# Patient Record
Sex: FEMALE | Race: WHITE | HISPANIC OR LATINO | Employment: OTHER | URBAN - METROPOLITAN AREA
[De-identification: names, ages, dates, MRNs, and addresses within clinical notes are randomized per-mention and may not be internally consistent; named-entity substitution may affect disease eponyms.]

---

## 2017-04-03 ENCOUNTER — OFFICE VISIT (OUTPATIENT)
Dept: MEDICAL GROUP | Age: 62
End: 2017-04-03
Payer: COMMERCIAL

## 2017-04-03 ENCOUNTER — HOSPITAL ENCOUNTER (OUTPATIENT)
Dept: RADIOLOGY | Facility: MEDICAL CENTER | Age: 62
End: 2017-04-03
Attending: FAMILY MEDICINE
Payer: COMMERCIAL

## 2017-04-03 VITALS
OXYGEN SATURATION: 94 % | TEMPERATURE: 97.1 F | HEIGHT: 64 IN | SYSTOLIC BLOOD PRESSURE: 138 MMHG | BODY MASS INDEX: 31.07 KG/M2 | WEIGHT: 182 LBS | HEART RATE: 101 BPM | DIASTOLIC BLOOD PRESSURE: 82 MMHG

## 2017-04-03 DIAGNOSIS — M25.552 CHRONIC LEFT HIP PAIN: ICD-10-CM

## 2017-04-03 DIAGNOSIS — E78.5 HYPERLIPIDEMIA WITH TARGET LDL LESS THAN 70: ICD-10-CM

## 2017-04-03 DIAGNOSIS — G89.29 CHRONIC LEFT HIP PAIN: ICD-10-CM

## 2017-04-03 DIAGNOSIS — E11.00 UNCONTROLLED TYPE 2 DIABETES MELLITUS WITH HYPEROSMOLARITY WITHOUT COMA, WITHOUT LONG-TERM CURRENT USE OF INSULIN (HCC): ICD-10-CM

## 2017-04-03 DIAGNOSIS — K21.9 GASTROESOPHAGEAL REFLUX DISEASE WITHOUT ESOPHAGITIS: ICD-10-CM

## 2017-04-03 DIAGNOSIS — Z00.00 PREVENTATIVE HEALTH CARE: ICD-10-CM

## 2017-04-03 LAB — POC HEMOGLOBIN: 8.3

## 2017-04-03 PROCEDURE — 90471 IMMUNIZATION ADMIN: CPT | Performed by: FAMILY MEDICINE

## 2017-04-03 PROCEDURE — 85018 HEMOGLOBIN: CPT | Performed by: FAMILY MEDICINE

## 2017-04-03 PROCEDURE — 99215 OFFICE O/P EST HI 40 MIN: CPT | Mod: 25 | Performed by: FAMILY MEDICINE

## 2017-04-03 PROCEDURE — 90732 PPSV23 VACC 2 YRS+ SUBQ/IM: CPT | Performed by: FAMILY MEDICINE

## 2017-04-03 PROCEDURE — 73521 X-RAY EXAM HIPS BI 2 VIEWS: CPT

## 2017-04-03 RX ORDER — OMEPRAZOLE 40 MG/1
40 CAPSULE, DELAYED RELEASE ORAL DAILY
Qty: 90 CAP | Refills: 0 | Status: SHIPPED | OUTPATIENT
Start: 2017-04-03 | End: 2020-08-30

## 2017-04-03 NOTE — ASSESSMENT & PLAN NOTE
Patient has been walking with a limp for several years. They are requesting V paperwork request for handicap privileges.

## 2017-04-03 NOTE — ASSESSMENT & PLAN NOTE
Patient denies any difficulty swallowing, no regurgitation, no eructation, no weight loss, no nocturnal asthma no food getting stuck in the chest.

## 2017-04-03 NOTE — ASSESSMENT & PLAN NOTE
The patient has been on a atorvastatin for years and tolerating this fine. The patient denies any muscle aches, no abdominal pain and no history of elevated liver enzymes.

## 2017-04-03 NOTE — ASSESSMENT & PLAN NOTE
Patient is a 62-year-old female who presents to clinic to establish care. She has a significant past medical history of uncontrolled diabetes mellitus, hypertension, GERD, chronic left hip pain.   The patient denied any chest pain, no sob, no stephens, no  pnd, no orthopnea, no headache, no changes in vision, no numbness or tingling, no nausea, no diarrhea, no abdominal pain, no fevers, no chills, no bright red blood per rectum, no  difficulty urinating, no burning during micturition, no depressed mood, no other concerns.

## 2017-04-03 NOTE — PROGRESS NOTES
This medical record contains text that has been entered with the assistance of computer voice recognition and dictation software.  Therefore, it may contain unintended errors in text, spelling, punctuation, or grammar    Chief Complaint   Patient presents with   • [facility]     Diabetes   • Medication Management       Domonique Galeano is a 62 y.o. female here evaluation and management of: Establish care, diabetes, hyperlipidemia, chronic left hip pain      HPI:     Type II diabetes mellitus, uncontrolled  Takes glipizide 10mg bid  Metformin 850mg tid  Stopped insulin on her own  Checks BG tid when she has machine    She stopped insulin because it was too expensive, also she did not feel good taking insuling. So she stopped.    He denied any polyuria, no polydipsia, no polyphagia, no weight loss, no numbness or tingling anywhere, no change in vision.    The patient is on an ACE inhibitor, aspirin and a statin, also checks feet meticulously daily for ulcerations.    Retinal screening due in august 2017  microalbumin is due   Up to date on vaccinations needs pneumovax    Hemoglobin A1c 8.3 in clinic today    Chronic left hip pain  Patient has been walking with a limp for several years. They are requesting DMV paperwork request for handicap privileges.    Hyperlipidemia with target LDL less than 70  The patient has been on a atorvastatin for years and tolerating this fine. The patient denies any muscle aches, no abdominal pain and no history of elevated liver enzymes.    [facility] health care  Patient is a 62-year-old female who presents to clinic to [facility]. She has a significant past medical history of uncontrolled diabetes mellitus, hypertension, GERD, chronic left hip pain.   The patient denied any chest pain, no sob, no stephens, no  pnd, no orthopnea, no headache, no changes in vision, no numbness or tingling, no nausea, no diarrhea, no abdominal pain, no fevers, no chills, no bright red blood per  rectum, no  difficulty urinating, no burning during micturition, no depressed mood, no other concerns.    GERD (gastroesophageal reflux disease)  Patient denies any difficulty swallowing, no regurgitation, no eructation, no weight loss, no nocturnal asthma no food getting stuck in the chest.    BMI 30.0-30.9,adult  Patient knows she can increase activity over the left hip pain prevents her from being more active. She will try to make changes in her diet.      Current medicines (including changes today)  Current Outpatient Prescriptions   Medication Sig Dispense Refill   • omeprazole (PRILOSEC) 40 MG delayed-release capsule Take 1 Cap by mouth every day. 90 Cap 0   • sitagliptin (JANUVIA) 50 MG Tab Take 1 Tab by mouth every day. 90 Tab 0   • Lancets Misc USE UP TO 3 TIMES A DAY AND AS NEEDED FOR SSX OF HIGH OR LOW SUGAR 100 Each 11   • metformin (GLUCOPHAGE) 850 MG Tab Take 1 Tab by mouth 3 times a day, with meals. 90 Tab 11   • Insulin Pen Needle 31G X 5 MM Misc 1 Units by Does not apply route 2 Times a Day. 100 Each 11   • atorvastatin (LIPITOR) 20 MG Tab TAKE 1 TAB BY MOUTH EVERY BEDTIME. 30 Tab 10   • lisinopril (PRINIVIL) 2.5 MG Tab Take 1 Tab by mouth every day. TAKE 1 TAB BY MOUTH EVERY DAY. 30 Tab 11   • polyethylene glycol 3350 (MIRALAX) Powder Take 17 g by mouth every day. 1 Bottle 11   • Blood Glucose Monitoring Suppl SUPPLIES Misc Test strips order: Test strips for One Touch Ultra meter. Sig: use TID and prn ssx high or low sugar #100 RF x 3 100 Each 11   • omeprazole (PRILOSEC) 20 MG delayed-release capsule TAKE ONE CAPSULE BY MOUTH EVERY DAY 30 Cap 0   • ONE TOUCH ULTRA TEST strip   3   • clotrimazole-betamethasone (LOTRISONE) 1-0.05 % CREA Apply 1 Application to affected area(s) 2 times a day. X 4 weeks 1 Tube 1   • ondansetron (ZOFRAN) 4 MG Tab tablet Take 1 Tab by mouth every 8 hours as needed (FOR NAUSEA OR VIMITING) for up to 6 doses. 6 Each 2   • ondansetron (ZOFRAN) 4 MG Tab tablet Take 1 Tab by  mouth every 8 hours as needed (FOR NAUSEA OR VIMITING) for up to 6 doses. 6 Each 2   • glipiZIDE (GLUCOTROL) 10 MG Tab TAKE 1 TAB BY MOUTH 2 TIMES A DAY. 180 Tab 1   • insulin glargine (LANTUS) 100 UNIT/ML Solution Pen-injector injection Inject 10 Units as instructed 2 times a day. 5 PEN 11   • omeprazole (PRILOSEC) 20 MG delayed-release capsule TAKE ONE CAPSULE BY MOUTH EVERY DAY 30 Cap 9   • Misc. Devices (WHEELCHAIR) Misc 1 Units by Does not apply route 1 time daily as needed. 1 Each 0   • miconazole (MICOTIN) 2 % CREA Apply 1 Application to affected area(s) 2 times a day. 60 g 3     No current facility-administered medications for this visit.     She  has a past medical history of Type II or unspecified type diabetes mellitus without mention of complication, uncontrolled (2015) and Hyperlipidemia LDL goal < 70 (2015). She also has no past medical history of Diabetic neuropathy (CMS-HCC), Hypertension, Heart murmur, Heart attack (CMS-HCC), Stroke (CMS-HCC), Arrhythmia, Seizure (CMS-HCC), Thyroid disease, Asthma, Chronic airway obstruction, not elsewhere classified (CMS-HCC), Emphysema, or CHF (congestive heart failure) (CMS-HCC).  She  has past surgical history that includes appendectomy.  Social History   Substance Use Topics   • Smoking status: Never Smoker    • Smokeless tobacco: Never Used   • Alcohol Use: No     Social History     Social History Narrative     Family History   Problem Relation Age of Onset   • Diabetes Mother    • Diabetes Sister    • Diabetes Maternal Grandmother      Family Status   Relation Status Death Age   • Mother     • Father Other    • Sister     • Brother Alive    • Maternal Grandmother     • Maternal Grandfather     • Paternal Grandmother     • Paternal Grandfather     • Sister Alive          ROS    All other systems reviewed and are negative     Objective:     Blood pressure 138/82, pulse 101, temperature 36.2 °C (97.1 °F),  "height 1.632 m (5' 4.25\"), weight 82.555 kg (182 lb), SpO2 94 %. Body mass index is 31 kg/(m^2).  Physical Exam:    Constitutional: Alert, no distress.  Skin: Warm, dry, good turgor, no rashes in visible areas.  Eye: Equal, round and reactive, conjunctiva clear, lids normal.  ENMT: Lips without lesions, good dentition, oropharynx clear.  Neck: Trachea midline, no masses, no thyromegaly. No cervical or supraclavicular lymphadenopathy.  Respiratory: Unlabored respiratory effort, lungs clear to auscultation, no wheezes, no ronchi.  Cardiovascular: Normal S1, S2, no murmur, no edema.  Abdomen: Soft, non-tender, no masses, no hepatosplenomegaly.  Psych: Alert and oriented x3, normal affect and mood.  Monofilament testing with a 10 gram force: sensation intact: intact bilaterally  Visual Inspection: Feet without maceration, ulcers, fissures.  Pedal pulses: intact bilaterally            Assessment and Plan:   The following treatment plan was discussed, again this medical record contains text that has been entered with the assistance of computer voice recognition and dictation software.  Therefore, it may contain unintended errors in text, spelling, punctuation, or grammar      1. Uncontrolled type 2 diabetes mellitus with hyperosmolarity without coma, without long-term current use of insulin (CMS-[facility])  The patient was counseled on the following  She refuses to take insulin  1. meticulous bilateral feet inspection daily  2. yearly dilated eye exams,   3. Wt. loss of 5% will decrease insulin resistance follow a low-fat diet and to begin an exercise program  4.  Pt. is on baby aspirin  5. Patient  is on an ace inhibitor will check microalbumin yearly  6. BP goal is ,130/80, and LDL goal is <70  7. Hba1c goal is less than 7  8. Recommend cmp and A1C evaluation every 3 months  9. Check blood sugar twice daily  and minimum once daily at various times  10. Also needs diabetic education  11. Recommend vaccinations: Yearly Flu, " Pneumvox, and hepatitis B vaccine  12. Yearly dental exam  13. Patient is on a Statin  - POCT Hemoglobin  - COMP METABOLIC PANEL; Future  - LIPID PROFILE; Future  - PNEUMOCOCCAL POLYSACCHARIDE VACCINE 23-VALENT =>1YO SQ/IM  - MICROALBUMIN CREAT RATIO URINE; Future  - sitagliptin (JANUVIA) 50 MG Tab; Take 1 Tab by mouth every day.  Dispense: 90 Tab; Refill: 0    2. Hyperlipidemia with target LDL less than 70  Patient has been stable with current management  We will make no changes for now    3. Gastroesophageal reflux disease without esophagitis     Gerd precautions given (avoid the supine position after eating, avoid tight fitting clothing, head of bed elevation by raisin legs of bed,  avoid eating prior to sleeping, avoid reflux-inducing foods (foods high in fat, chocolate, peppermint, and excessive alcohol, which can decrease LES pressure), promote salivation by chewing gum or lozenges,  - omeprazole (PRILOSEC) 40 MG delayed-release capsule; Take 1 Cap by mouth every day.  Dispense: 90 Cap; Refill: 0    4. Preventative health care  Care has been established  We need baseline labs to establish a clinical profile  We will then consider daily prophylactic aspirin   Weight the benefits vs risk of GI bleed  We reviewed [place] preventative guidelines  This patient is due for mammogram  Up to date on colonoscopy   Requested Medical records to be sent to us  This patient is due for    5. Chronic left hip pain  DMV paperwork signed for handicap kelley x 2 years    - DX-HIP-BILATERAL-WITH PELVIS-2 VIEWS; Future    6. BMI 30.0-30.9,adult    We discussed agressive lifestyle modifications with weight loss, aerobic exercise, and eating a prudent diet, which  included avoiding fried foods, using low-fat milk and avoiding simple carbohydrate, making a food diary. If you can't run because of pain do the stationary bike/elipticle or swim 30minutes 3 times per wk, in the beginning and then gradually increase.      Over 40 minutes  spent with patient face to face, greater than 50% time spent with plan/cordination of care as above in my A/P.      Followup: Return in about 2 months (around 6/3/2017) for Reevaluation.

## 2017-04-03 NOTE — MR AVS SNAPSHOT
"        Domonique Galeano   4/3/2017 2:00 PM   Office Visit   MRN: [id number]    Department:  [address] [place] [address]   Dept Phone:  [phone]    Description:  Female : 1955   Provider:  Lily Le M.D.           Reason for Visit     Establish Care Diabetes    Medication Management           Allergies as of 4/3/2017     No Known Allergies      You were diagnosed with     Uncontrolled type 2 diabetes mellitus with hyperosmolarity without coma, without long-term current use of insulin (CMS-HCC)   [2918209]       Hyperlipidemia with target LDL less than 70   [674872]       Gastroesophageal reflux disease without esophagitis   [463692]       Preventative health care   [078279]       Chronic left hip pain   [024191]       BMI 30.0-30.9,adult   [129271]         Vital Signs     Blood Pressure Pulse Temperature Height Weight Body Mass Index    138/82 mmHg 101 36.2 °C (97.1 °F) 1.632 m (5' 4.25\") 82.555 kg (182 lb) 31.00 kg/m2    Oxygen Saturation Smoking Status                94% Never Smoker           Basic Information     Date Of Birth Sex Race Ethnicity Preferred Language    1955 Female Unknown  Origin (Icelandic,Lebanese,Welsh,Beny, etc) Icelandic      Your appointments     2017  1:40 PM   Established Patient with Lily Le M.D.   [address] [id number] [phone]           You will be receiving a confirmation call a few days before your appointment from our automated call confirmation system.              Problem List              ICD-10-CM Priority Class Noted - Resolved    BMI 30.0-30.9,adult Z68.30   2015 - Present    Varicose veins I86.8   2015 - Present    Type II diabetes mellitus, uncontrolled (CMS-HCC) E11.65   2015 - Present    GERD (gastroesophageal reflux disease) K21.9   2015 - Present    Fibroid, uterine D25.9   2015 - Present    Hyperlipidemia with " target LDL less than 70 E78.5   6/12/2015 - Present    Chronic left hip pain M25.552, G89.29   10/19/2015 - Present    Preventative health care Z00.00   4/3/2017 - Present      Health Maintenance        Date Due Completion Dates    DIABETES MONOFILAMENT / LE EXAM 1955 ---    IMM PNEUMOCOCCAL 19-64 (ADULT) MEDIUM RISK SERIES (1 of 1 - PPSV23) 2/16/1975 ---    IMM ZOSTER VACCINE 2/16/2016 ---    MAMMOGRAM 6/4/2016 6/4/2015, 4/13/2005 (Done)    Override on 4/13/2005: Done (Nl)    FASTING LIPID PROFILE 10/5/2016 10/5/2015    URINE ACR / MICROALBUMIN 10/5/2016 10/5/2015    A1C SCREENING 2/8/2017 8/8/2016, 5/19/2016, 10/5/2015, 4/13/2015    RETINAL SCREENING 8/15/2017 8/15/2016    SERUM CREATININE 12/16/2017 12/16/2016, 10/5/2015    PAP SMEAR 8/15/2019 8/15/2016 (Done), 4/13/2005 (Done)    Override on 8/15/2016: Done    Override on 4/13/2005: Done (NL)    IMM DTaP/Tdap/Td Vaccine (2 - Td) 4/13/2025 4/13/2015    COLONOSCOPY 6/8/2025 6/8/2015            Current Immunizations     Influenza Vaccine Quad Inj (Preserved) 10/19/2015    Pneumococcal polysaccharide vaccine (PPSV-23)  Incomplete    Tdap Vaccine 4/13/2015      Below and/or attached are the medications your provider expects you to take. Review all of your home medications and newly ordered medications with your provider and/or pharmacist. Follow medication instructions as directed by your provider and/or pharmacist. Please keep your medication list with you and share with your provider. Update the information when medications are discontinued, doses are changed, or new medications (including over-the-counter products) are added; and carry medication information at all times in the event of emergency situations     Allergies:  No Known Allergies          Medications  Valid as of: April 03, 2017 -  2:51 PM    Generic Name Brand Name Tablet Size Instructions for use    Atorvastatin Calcium (Tab) LIPITOR 20 MG TAKE 1 TAB BY MOUTH EVERY BEDTIME.        Blood Glucose  Monitoring Suppl (Misc) Blood Glucose Monitoring Suppl SUPPLIES Test strips order: Test strips for One Touch Ultra meter. Sig: use TID and prn ssx high or low sugar #100 RF x 3        Clotrimazole-Betamethasone (Cream) LOTRISONE 1-0.05 % Apply 1 Application to affected area(s) 2 times a day. X 4 weeks        GlipiZIDE (Tab) GLUCOTROL 10 MG TAKE 1 TAB BY MOUTH 2 TIMES A DAY.        Glucose Blood (Strip) ONE TOUCH ULTRA TEST          Insulin Glargine (Solution Pen-injector) LANTUS 100 UNIT/ML Inject 10 Units as instructed 2 times a day.        Insulin Pen Needle (Misc) Insulin Pen Needle 31G X 5 MM 1 Units by Does not apply route 2 Times a Day.        Lancets (Misc) Lancets  USE UP TO 3 TIMES A DAY AND AS NEEDED FOR SSX OF HIGH OR LOW SUGAR        Lisinopril (Tab) PRINIVIL 2.5 MG Take 1 Tab by mouth every day. TAKE 1 TAB BY MOUTH EVERY DAY.        MetFORMIN HCl (Tab) GLUCOPHAGE 850 MG Take 1 Tab by mouth 3 times a day, with meals.        Miconazole Nitrate (Cream) MICOTIN 2 % Apply 1 Application to affected area(s) 2 times a day.        Misc. Devices (Misc) Wheelchair  1 Units by Does not apply route 1 time daily as needed.        Omeprazole (CAPSULE DELAYED RELEASE) PRILOSEC 20 MG TAKE ONE CAPSULE BY MOUTH EVERY DAY        Omeprazole (CAPSULE DELAYED RELEASE) PRILOSEC 20 MG TAKE ONE CAPSULE BY MOUTH EVERY DAY        Omeprazole (CAPSULE DELAYED RELEASE) PRILOSEC 40 MG Take 1 Cap by mouth every day.        Ondansetron HCl (Tab) ZOFRAN 4 MG Take 1 Tab by mouth every 8 hours as needed (FOR NAUSEA OR VIMITING) for up to 6 doses.        Ondansetron HCl (Tab) ZOFRAN 4 MG Take 1 Tab by mouth every 8 hours as needed (FOR NAUSEA OR VIMITING) for up to 6 doses.        Polyethylene Glycol 3350 (Powder) MIRALAX  Take 17 g by mouth every day.        SITagliptin Phosphate (Tab) JANUVIA 50 MG Take 1 Tab by mouth every day.        .                 Medicines prescribed today were sent to:     [id number] - [place], [place] - [address]  [address]    [address] [place] [address]    Phone: [phone] Fax: [phone]    Open 24 Hours?: No      Medication refill instructions:       If your prescription bottle indicates you have medication refills left, it is not necessary to call your provider’s office. Please contact your pharmacy and they will refill your medication.    If your prescription bottle indicates you do not have any refills left, you may request refills at any time through one of the following ways: The online Right Hemisphere system (except Urgent Care), by calling your provider’s office, or by asking your pharmacy to contact your provider’s office with a refill request. Medication refills are processed only during regular business hours and may not be available until the next business day. Your provider may request additional information or to have a follow-up visit with you prior to refilling your medication.   *Please Note: Medication refills are assigned a new Rx number when refilled electronically. Your pharmacy may indicate that no refills were authorized even though a new prescription for the same medication is available at the pharmacy. Please request the medicine by name with the pharmacy before contacting your provider for a refill.        Your To Do List     Future Labs/Procedures Complete By Expires    COMP METABOLIC PANEL  As directed 4/3/2018    DX-HIP-BILATERAL-WITH PELVIS-2 VIEWS  As directed 4/3/2018    LIPID PROFILE  As directed 4/3/2018    MICROALBUMIN CREAT RATIO URINE  As directed 4/3/2018         Right Hemisphere Access Code: [email or web address]  Expires: 4/25/2017 11:03 AM    Right Hemisphere  A secure, online tool to manage your health information     [employer] Right Hemisphere® is a secure, online tool that connects you to your personalized health information from the privacy of your home -- day or night - making it very easy for you to manage your healthcare. Once the activation process is completed, you can even  access your medical information using the StudyMax derek, which is available for free in the Apple Derek store or Google Play store.     StudyMax provides the following levels of access (as shown below):   My Chart Features   [facility] Doctor [facility]  Specialists [facility]  Urgent  Care Non-[facility]  Primary Care  Doctor   Email your healthcare team securely and privately 24/7 X X X    Manage appointments: schedule your next appointment; view details of past/upcoming appointments X      Request prescription refills. X      View recent personal medical records, including lab and immunizations X X X X   View health record, including health history, allergies, medications X X X X   Read reports about your outpatient visits, procedures, consult and ER notes X X X X   See your discharge summary, which is a recap of your hospital and/or ER visit that includes your diagnosis, lab results, and care plan. X X       How to register for StudyMax:  1. Go to  [email or web address].  2. Click on the Sign Up Now box, which takes you to the New Member Sign Up page. You will need to provide the following information:  a. Enter your StudyMax Access Code exactly as it appears at the top of this page. (You will not need to use this code after you’ve completed the sign-up process. If you do not sign up before the expiration date, you must request a new code.)   b. Enter your date of birth.   c. Enter your home email address.   d. Click Submit, and follow the next screen’s instructions.  3. Create a StudyMax ID. This will be your StudyMax login ID and cannot be changed, so think of one that is secure and easy to remember.  4. Create a StudyMax password. You can change your password at any time.  5. Enter your Password Reset Question and Answer. This can be used at a later time if you forget your password.   6. Enter your e-mail address. This allows you to receive e-mail notifications when new information is available in StudyMax.  7. Click  Sign Up. You can now view your health information.    For assistance activating your ZeroVM account, call [phone]

## 2017-04-03 NOTE — ASSESSMENT & PLAN NOTE
Patient knows she can increase activity over the left hip pain prevents her from being more active. She will try to make changes in her diet.

## 2017-08-04 ENCOUNTER — TELEPHONE (OUTPATIENT)
Dept: MEDICAL GROUP | Age: 62
End: 2017-08-04

## 2017-08-04 DIAGNOSIS — E10.8 DIABETES MELLITUS TYPE 1 WITH COMPLICATIONS (HCC): ICD-10-CM

## 2017-08-04 NOTE — TELEPHONE ENCOUNTER
Refill done.  Pt needs to have fasting labs done and schedule a follow up appointment.  Labs ordered.  Lily Le M.D.

## 2019-03-10 NOTE — ASSESSMENT & PLAN NOTE
Bedside and Verbal shift change report given to self (oncoming nurse) by Jose Luis English RN (offgoing nurse). Report included the following information SBAR, Kardex, MAR and Recent Results. Takes glipizide 10mg bid  Metformin 850mg tid  Stopped insulin on her own  Checks BG tid when she has machine    She stopped insulin because it was too expensive, also she did not feel good taking insuling. So she stopped.    He denied any polyuria, no polydipsia, no polyphagia, no weight loss, no numbness or tingling anywhere, no change in vision.    The patient is on an ACE inhibitor, aspirin and a statin, also checks feet meticulously daily for ulcerations.    Retinal screening due in august 2017  microalbumin is due   Up to date on vaccinations needs pneumovax    Hemoglobin A1c 8.3 in clinic today

## 2020-07-06 ENCOUNTER — TELEPHONE (OUTPATIENT)
Dept: SCHEDULING | Facility: IMAGING CENTER | Age: 65
End: 2020-07-06

## 2020-08-04 ENCOUNTER — HOSPITAL ENCOUNTER (OUTPATIENT)
Dept: LAB | Facility: MEDICAL CENTER | Age: 65
End: 2020-08-04
Attending: INTERNAL MEDICINE
Payer: COMMERCIAL

## 2020-08-04 ENCOUNTER — OFFICE VISIT (OUTPATIENT)
Dept: MEDICAL GROUP | Facility: MEDICAL CENTER | Age: 65
End: 2020-08-04
Payer: COMMERCIAL

## 2020-08-04 VITALS
OXYGEN SATURATION: 94 % | HEART RATE: 84 BPM | DIASTOLIC BLOOD PRESSURE: 80 MMHG | WEIGHT: 162.6 LBS | BODY MASS INDEX: 27.09 KG/M2 | TEMPERATURE: 97.6 F | HEIGHT: 65 IN | SYSTOLIC BLOOD PRESSURE: 122 MMHG | RESPIRATION RATE: 20 BRPM

## 2020-08-04 DIAGNOSIS — R34 OLIGURIA: ICD-10-CM

## 2020-08-04 DIAGNOSIS — G60.9 IDIOPATHIC PERIPHERAL NEUROPATHY: ICD-10-CM

## 2020-08-04 DIAGNOSIS — R19.00 PELVIC MASS: ICD-10-CM
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
## 2020-08-04 DIAGNOSIS — K64.8 OTHER HEMORRHOIDS: ICD-10-CM

## 2020-08-04 DIAGNOSIS — L30.4 INTERTRIGO: ICD-10-CM

## 2020-08-04 DIAGNOSIS — L65.9 HAIR LOSS: ICD-10-CM

## 2020-08-04 DIAGNOSIS — R53.83 OTHER FATIGUE: ICD-10-CM

## 2020-08-04 DIAGNOSIS — R63.4 UNINTENTIONAL WEIGHT LOSS: ICD-10-CM

## 2020-08-04 DIAGNOSIS — K59.09 CHRONIC CONSTIPATION: ICD-10-CM

## 2020-08-04 DIAGNOSIS — E11.42 DIABETIC PERIPHERAL NEUROPATHY (HCC): ICD-10-CM

## 2020-08-04 DIAGNOSIS — E11.65 UNCONTROLLED TYPE 2 DIABETES MELLITUS WITH HYPERGLYCEMIA (HCC): ICD-10-CM

## 2020-08-04 DIAGNOSIS — B35.1 ONYCHOMYCOSIS: ICD-10-CM

## 2020-08-04 DIAGNOSIS — R06.83 SNORING: ICD-10-CM

## 2020-08-04 DIAGNOSIS — R42 POSTURAL DIZZINESS WITH PRESYNCOPE: ICD-10-CM

## 2020-08-04 DIAGNOSIS — N30.00 ACUTE CYSTITIS WITHOUT HEMATURIA: ICD-10-CM

## 2020-08-04 DIAGNOSIS — R14.0 ABDOMINAL DISTENSION (GASEOUS): ICD-10-CM

## 2020-08-04 DIAGNOSIS — R55 POSTURAL DIZZINESS WITH PRESYNCOPE: ICD-10-CM

## 2020-08-04 DIAGNOSIS — M16.12 PRIMARY OSTEOARTHRITIS OF LEFT HIP: ICD-10-CM

## 2020-08-04 PROBLEM — R26.89 IMBALANCE: Status: ACTIVE | Noted: 2020-08-04

## 2020-08-04 PROBLEM — Z00.00 PREVENTATIVE HEALTH CARE: Status: RESOLVED | Noted: 2017-04-03 | Resolved: 2020-08-04

## 2020-08-04 PROBLEM — R10.2 PELVIC PAIN: Status: ACTIVE | Noted: 2020-08-04

## 2020-08-04 LAB
ALBUMIN SERPL BCP-MCNC: 4.1 G/DL (ref 3.2–4.9)
ALBUMIN/GLOB SERPL: 1.3 G/DL
ALP SERPL-CCNC: 118 U/L (ref 30–99)
ALT SERPL-CCNC: 7 U/L (ref 2–50)
ANION GAP SERPL CALC-SCNC: 12 MMOL/L (ref 7–16)
APPEARANCE UR: CLEAR
AST SERPL-CCNC: 15 U/L (ref 12–45)
BACTERIA #/AREA URNS HPF: ABNORMAL /HPF
BASOPHILS # BLD AUTO: 0.6 % (ref 0–1.8)
BASOPHILS # BLD: 0.05 K/UL (ref 0–0.12)
BILIRUB SERPL-MCNC: 0.4 MG/DL (ref 0.1–1.5)
BILIRUB UR QL STRIP.AUTO: NEGATIVE
BUN SERPL-MCNC: 15 MG/DL (ref 8–22)
CALCIUM SERPL-MCNC: 9.4 MG/DL (ref 8.4–10.2)
CHLORIDE SERPL-SCNC: 102 MMOL/L (ref 96–112)
CHOLEST SERPL-MCNC: 218 MG/DL (ref 100–199)
CO2 SERPL-SCNC: 23 MMOL/L (ref 20–33)
COLOR UR: YELLOW
CREAT SERPL-MCNC: 0.53 MG/DL (ref 0.5–1.4)
CREAT UR-MCNC: 82.06 MG/DL
EOSINOPHIL # BLD AUTO: 0.18 K/UL (ref 0–0.51)
EOSINOPHIL NFR BLD: 2.1 % (ref 0–6.9)
EPI CELLS #/AREA URNS HPF: ABNORMAL /HPF
ERYTHROCYTE [DISTWIDTH] IN BLOOD BY AUTOMATED COUNT: 42.6 FL (ref 35.9–50)
FASTING STATUS PATIENT QL REPORTED: NORMAL
GLOBULIN SER CALC-MCNC: 3.2 G/DL (ref 1.9–3.5)
GLUCOSE SERPL-MCNC: 316 MG/DL (ref 65–99)
GLUCOSE UR STRIP.AUTO-MCNC: >=1000 MG/DL
HCT VFR BLD AUTO: 43.1 % (ref 37–47)
HDLC SERPL-MCNC: 43 MG/DL
HGB BLD-MCNC: 14.1 G/DL (ref 12–16)
IMM GRANULOCYTES # BLD AUTO: 0.02 K/UL (ref 0–0.11)
IMM GRANULOCYTES NFR BLD AUTO: 0.2 % (ref 0–0.9)
KETONES UR STRIP.AUTO-MCNC: 15 MG/DL
LDLC SERPL CALC-MCNC: 146 MG/DL
LEUKOCYTE ESTERASE UR QL STRIP.AUTO: ABNORMAL
LYMPHOCYTES # BLD AUTO: 2.81 K/UL (ref 1–4.8)
LYMPHOCYTES NFR BLD: 33.5 % (ref 22–41)
MCH RBC QN AUTO: 28.8 PG (ref 27–33)
MCHC RBC AUTO-ENTMCNC: 32.7 G/DL (ref 33.6–35)
MCV RBC AUTO: 88.1 FL (ref 81.4–97.8)
MICRO URNS: ABNORMAL
MICROALBUMIN UR-MCNC: 1.9 MG/DL
MICROALBUMIN/CREAT UR: 23 MG/G (ref 0–30)
MONOCYTES # BLD AUTO: 0.37 K/UL (ref 0–0.85)
MONOCYTES NFR BLD AUTO: 4.4 % (ref 0–13.4)
MUCOUS THREADS #/AREA URNS HPF: ABNORMAL /HPF
NEUTROPHILS # BLD AUTO: 4.96 K/UL (ref 2–7.15)
NEUTROPHILS NFR BLD: 59.2 % (ref 44–72)
NITRITE UR QL STRIP.AUTO: POSITIVE
NRBC # BLD AUTO: 0 K/UL
NRBC BLD-RTO: 0 /100 WBC
PH UR STRIP.AUTO: 5 [PH] (ref 5–8)
PLATELET # BLD AUTO: 239 K/UL (ref 164–446)
PMV BLD AUTO: 10.9 FL (ref 9–12.9)
POTASSIUM SERPL-SCNC: 4.4 MMOL/L (ref 3.6–5.5)
PROT SERPL-MCNC: 7.3 G/DL (ref 6–8.2)
PROT UR QL STRIP: NEGATIVE MG/DL
RBC # BLD AUTO: 4.89 M/UL (ref 4.2–5.4)
RBC UR QL AUTO: NEGATIVE
SODIUM SERPL-SCNC: 137 MMOL/L (ref 135–145)
SP GR UR STRIP.AUTO: 1.02
TRIGL SERPL-MCNC: 145 MG/DL (ref 0–149)
TSH SERPL DL<=0.005 MIU/L-ACNC: 1.19 UIU/ML (ref 0.38–5.33)
WBC # BLD AUTO: 8.4 K/UL (ref 4.8–10.8)
WBC #/AREA URNS HPF: ABNORMAL /HPF

## 2020-08-04 PROCEDURE — 80053 COMPREHEN METABOLIC PANEL: CPT

## 2020-08-04 PROCEDURE — 99204 OFFICE O/P NEW MOD 45 MIN: CPT | Performed by: INTERNAL MEDICINE

## 2020-08-04 PROCEDURE — 83036 HEMOGLOBIN GLYCOSYLATED A1C: CPT

## 2020-08-04 PROCEDURE — 84443 ASSAY THYROID STIM HORMONE: CPT

## 2020-08-04 PROCEDURE — 85025 COMPLETE CBC W/AUTO DIFF WBC: CPT

## 2020-08-04 PROCEDURE — 36415 COLL VENOUS BLD VENIPUNCTURE: CPT

## 2020-08-04 PROCEDURE — 82607 VITAMIN B-12: CPT

## 2020-08-04 PROCEDURE — 82570 ASSAY OF URINE CREATININE: CPT

## 2020-08-04 PROCEDURE — 80061 LIPID PANEL: CPT

## 2020-08-04 PROCEDURE — 82746 ASSAY OF FOLIC ACID SERUM: CPT

## 2020-08-04 PROCEDURE — 82043 UR ALBUMIN QUANTITATIVE: CPT

## 2020-08-04 PROCEDURE — 81001 URINALYSIS AUTO W/SCOPE: CPT

## 2020-08-04 RX ORDER — AMOXICILLIN AND CLAVULANATE POTASSIUM 875; 125 MG/1; MG/1
1 TABLET, FILM COATED ORAL 2 TIMES DAILY
Qty: 6 TAB | Refills: 0 | Status: SHIPPED | OUTPATIENT
Start: 2020-08-04 | End: 2020-08-07

## 2020-08-04 ASSESSMENT — PATIENT HEALTH QUESTIONNAIRE - PHQ9
5. POOR APPETITE OR OVEREATING: 0 - NOT AT ALL
SUM OF ALL RESPONSES TO PHQ QUESTIONS 1-9: 11
CLINICAL INTERPRETATION OF PHQ2 SCORE: 2

## 2020-08-04 NOTE — PROGRESS NOTES
New Patient to Establish      CC: DM, hip pain, pelvis pain, low urine flow, fatigue, weight loss    HPI:   65 y.o. female Macanese-speaking patient came into clinic for above.  She came in with her daughter. she saw her last primary care doctor 3 years ago.  Her diabetes was uncontrolled at that time and she was on insulin, but she could not tolerate medications and she felt that the doctor was not listening.  So she stopped codeine and stopped all the medications in the past 3 years.     Today she presented with multiple complaints including concern for diabetes.      Type II diabetes mellitus, uncontrolled (HCC)  X 6 yrs. Stopped all meds 3 years ago because meds are making her feel worse, peri insulin.  She has lost 50 pounds over 3 years.   She continues to drink soda and eat greasy food according to her daughter.    She has developed numbness in her lower extremities.  She feels unsteady walking because of loss of sensation.  She has fallen once last week.  She denies any fall in the past year apart from that one episode.     She feels lightheaded when she stands up suddenly which lasts for a few seconds.    She noticed that she is only been making 2 to 3 ounces of urine a day although she drinks a lot of water.  This has been going on over 1 & 1/2 year.     She feels tired and weak all the time.  She is also concerned about snoring at night which can be heard from another room.    She feels discomfort in her pelvis chronically.  She complains of constipation, but mostly she has bowel movement every day.  She thinks she has hemorrhoids because of sensation of tissue in her anus when stools are passed.  Colonoscopy was in 2015 which was normal. US uterus in 2015 showed 4.1 cm fundal mass, likely leiomyoma.    Chronic left hip pain  X years. Worse with walking, prolonged standing and sitting. flanax helps with pain.     Onychomycosis  X 20 yrs, bilat toes, peri on big toes. Painful at night. DM Macrina neuropathy +.  Filing and putting alcohol on does not help.  She also has a fungal rash in inguinal area.    ROS  10 systems reviewed, negative except mentioned as above.      Patient Active Problem List    Diagnosis Date Noted   • Idiopathic peripheral neuropathy 08/04/2020   • Pelvic mass 08/04/2020   • Oliguria 08/04/2020   • Onychomycosis 08/04/2020   • Intertrigo 08/04/2020   • Other hemorrhoids 08/04/2020   • Postural dizziness with presyncope 08/04/2020   • Fall from ground level 08/04/2020   • Other fatigue 08/04/2020   • Snoring 08/04/2020   • Hair loss 08/04/2020   • Unintentional weight loss 08/04/2020   • Chronic constipation 08/04/2020   • Chronic left hip pain 10/19/2015   • Fibroid, uterine 06/12/2015   • Hyperlipidemia with target LDL less than 70 06/12/2015   • GERD (gastroesophageal reflux disease) 04/16/2015   • Varicose veins 04/13/2015   • Type II diabetes mellitus, uncontrolled (HCC) 04/13/2015       Past Medical History:   Diagnosis Date   • Diabetic peripheral neuropathy (HCC) 8/4/2020   • Hyperlipidemia LDL goal < 70 6/12/2015   • Type II or unspecified type diabetes mellitus without mention of complication, uncontrolled 4/13/2015       Current Outpatient Medications   Medication Sig Dispense Refill   • miconazole (MICOTIN) 2 % Cream Apply 1 Application to affected area(s) 2 times a day. 60 g 3   • metformin (GLUCOPHAGE) 850 MG Tab TAKE 1 TAB BY MOUTH 3 TIMES A DAY, WITH MEALS. 180 Tab 0   • atorvastatin (LIPITOR) 20 MG Tab TAKE 1 TAB BY MOUTH EVERY BEDTIME. 30 Tab 0   • lisinopril (PRINIVIL) 2.5 MG Tab TAKE 1 TABLET BY MOUTH EVERY DAY 30 Tab 0   • glipiZIDE (GLUCOTROL) 10 MG Tab TAKE 1 TAB BY MOUTH 2 TIMES A DAY. 180 Tab 1   • omeprazole (PRILOSEC) 40 MG delayed-release capsule Take 1 Cap by mouth every day. 90 Cap 0   • sitagliptin (JANUVIA) 50 MG Tab Take 1 Tab by mouth every day. 90 Tab 0   • ondansetron (ZOFRAN) 4 MG Tab tablet Take 1 Tab by mouth every 8 hours as needed (FOR NAUSEA OR VIMITING) for  up to 6 doses. 6 Each 2   • ondansetron (ZOFRAN) 4 MG Tab tablet Take 1 Tab by mouth every 8 hours as needed (FOR NAUSEA OR VIMITING) for up to 6 doses. 6 Each 2   • Lancets Misc USE UP TO 3 TIMES A DAY AND AS NEEDED FOR SSX OF HIGH OR LOW SUGAR 100 Each 11   • insulin glargine (LANTUS) 100 UNIT/ML Solution Pen-injector injection Inject 10 Units as instructed 2 times a day. 5 PEN 11   • Insulin Pen Needle 31G X 5 MM Misc 1 Units by Does not apply route 2 Times a Day. 100 Each 11   • omeprazole (PRILOSEC) 20 MG delayed-release capsule TAKE ONE CAPSULE BY MOUTH EVERY DAY 30 Cap 9   • polyethylene glycol 3350 (MIRALAX) Powder Take 17 g by mouth every day. 1 Bottle 11   • Misc. Devices (WHEELCHAIR) Misc 1 Units by Does not apply route 1 time daily as needed. 1 Each 0   • Blood Glucose Monitoring Suppl SUPPLIES Misc Test strips order: Test strips for One Touch Ultra meter. Sig: use TID and prn ssx high or low sugar #100 RF x 3 100 Each 11   • omeprazole (PRILOSEC) 20 MG delayed-release capsule TAKE ONE CAPSULE BY MOUTH EVERY DAY 30 Cap 0   • ONE TOUCH ULTRA TEST strip   3   • clotrimazole-betamethasone (LOTRISONE) 1-0.05 % CREA Apply 1 Application to affected area(s) 2 times a day. X 4 weeks 1 Tube 1     No current facility-administered medications for this visit.        Allergies as of 08/04/2020   • (No Known Allergies)       Social History     Socioeconomic History   • Marital status:      Spouse name: Not on file   • Number of children: Not on file   • Years of education: Not on file   • Highest education level: Not on file   Occupational History   • Not on file   Social Needs   • Financial resource strain: Not on file   • Food insecurity     Worry: Not on file     Inability: Not on file   • Transportation needs     Medical: Not on file     Non-medical: Not on file   Tobacco Use   • Smoking status: Never Smoker   • Smokeless tobacco: Never Used   Substance and Sexual Activity   • Alcohol use: No   • Drug use:  "No   • Sexual activity: Yes     Partners: Male     Comment:  x 36   Lifestyle   • Physical activity     Days per week: Not on file     Minutes per session: Not on file   • Stress: Not on file   Relationships   • Social connections     Talks on phone: Not on file     Gets together: Not on file     Attends Hoahaoism service: Not on file     Active member of club or organization: Not on file     Attends meetings of clubs or organizations: Not on file     Relationship status: Not on file   • Intimate partner violence     Fear of current or ex partner: Not on file     Emotionally abused: Not on file     Physically abused: Not on file     Forced sexual activity: Not on file   Other Topics Concern   • Not on file   Social History Narrative   • Not on file       Family History   Problem Relation Age of Onset   • Diabetes Mother    • Diabetes Sister    • Diabetes Maternal Grandmother        Past Surgical History:   Procedure Laterality Date   • APPENDECTOMY           /80 (BP Location: Left arm, Patient Position: Sitting, BP Cuff Size: Adult)   Pulse 84   Temp 36.4 °C (97.6 °F) (Temporal)   Resp 20   Ht 1.641 m (5' 4.6\")   Wt 73.8 kg (162 lb 9.6 oz)   SpO2 94%   BMI 27.39 kg/m²     Physical Exam  General: Alert and oriented, No apparent distress.  Eyes: Pupils are equal and reactive. No scleral icterus.  Throat: Clear no erythema or exudates noted.  Neck: Supple. No cervical or supraclavicular lymphadenopathy noted. Thyroid not enlarged.  Lungs: Clear to auscultation bilaterally without any wheezing, crepitations.  Cardiovascular: Regular rate and rhythm. No murmurs, rubs or gallops.  Abdomen: Bowel sound +, soft, diffusely tender, no rebound or guarding, suprapubic distension. CVA tenderness +, left > right   Extremities: No clubbing, cyanosis, edema.  Skin: No rash or suspicious skin lesions noted.  Neuro: A & O x 4. Normal speech and memory. 4/5 in b/l LEs, reflexes are absent at patella. L3,L5 " sensation reduced.        Assessment and Plan    1. Uncontrolled type 2 diabetes mellitus with hyperglycemia (HCC)  Do labs are discuss therapy.  - Comp Metabolic Panel; Future  - Lipid Profile; Future  - HEMOGLOBIN A1C; Future  - MICROALBUMIN CREAT RATIO URINE; Future    2. Idiopathic peripheral neuropathy  Initially, I thought this might be typical DM neuropathy, but microfilament test is normal and numbness is more in L3, L5 inner legs. There is also proximal muscle mass loss in LEs.  - VITAMIN B12; Future  - FOLATE; Future    3. Unintentional weight loss  - CBC WITH DIFFERENTIAL; Future  - TSH WITH REFLEX TO FT4; Future    4. Pelvic mass  5. Oliguria  16. Abdominal distension (gaseous)  Reevaluate uterus mass, possible pressure symptoms and oliguria.  Abdomen is diffusely tender with suprapubic mass / distension.   - CT pelvis abd urgently to evaluate. W/o since there may be hydronephrosis.  - URINALYSIS; Future    6. Primary osteoarthritis of left hip  Advanced OA on XR  - possibly need hip replacement electively. Discussed to address more urgent issues above first.    7. Onychomycosis  - need lab before starting oral terbinafine.    8. Intertrigo  - miconazole (MICOTIN) 2 % Cream; Apply 1 Application to affected area(s) 2 times a day.  Dispense: 60 g; Refill: 3    9. Other hemorrhoids  15. Chronic constipation  - avoid constipation. Colonoscopy was normal.    10. Postural dizziness with presyncope  11. Fall from ground level  Multifactorial from neuropathy, uncontrolled DM, hip OA.  - using cane currently, will recommend PT if this is recurrent    12. Other fatigue  - address medical issues as above    13. Snoring  - will do sleep studies. Discussed to address more urgent issues above first.    14. Hair loss  - check TSH     Followup: based on labs    Patient was seen for 50 minutes face to face of which > 50% of appointment time was spent on counseling and coordination of care regarding the above.      Signed  by: Mena Christensen M.D.

## 2020-08-04 NOTE — ASSESSMENT & PLAN NOTE
X 20 yrs, bilat toes, peri on big toes. Painful at night. DM Macrina neuropathy +. Filing and putting alcohol on does not help.

## 2020-08-04 NOTE — PROGRESS NOTES
Call daughter about urinalysis indicating UTI.  No WBC, fever or chills.   Start Augmentin for 3 days.  Follow-up rest of pending results later.

## 2020-08-05 ENCOUNTER — TELEPHONE (OUTPATIENT)
Dept: MEDICAL GROUP | Facility: MEDICAL CENTER | Age: 65
End: 2020-08-05

## 2020-08-05 DIAGNOSIS — E11.65 UNCONTROLLED TYPE 2 DIABETES MELLITUS WITH HYPERGLYCEMIA (HCC): ICD-10-CM

## 2020-08-05 LAB
EST. AVERAGE GLUCOSE BLD GHB EST-MCNC: 395 MG/DL
FOLATE SERPL-MCNC: 14.5 NG/ML
HBA1C MFR BLD: 15.4 % (ref 0–5.6)
VIT B12 SERPL-MCNC: 1503 PG/ML (ref 211–911)

## 2020-08-05 RX ORDER — LANCETS 30 GAUGE
EACH MISCELLANEOUS
Qty: 100 EACH | Refills: 3 | Status: SHIPPED | OUTPATIENT
Start: 2020-08-05 | End: 2020-08-30

## 2020-08-05 RX ORDER — GLUCOSAMINE HCL/CHONDROITIN SU 500-400 MG
CAPSULE ORAL
Qty: 100 EACH | Refills: 3 | Status: SHIPPED | OUTPATIENT
Start: 2020-08-05 | End: 2020-08-30

## 2020-08-06 NOTE — TELEPHONE ENCOUNTER
Talked to daughter over the phone. A1C 15.   Restart long-acting insulin Levemir, previously did not do well with Lantus.  Levemir 10 units at night, increase 2 units every week as long as fasting glucose is above 140.   Start metformin at thousand milligrams twice daily.   Follow-up in clinic in a month.

## 2020-08-13 ENCOUNTER — HOSPITAL ENCOUNTER (OUTPATIENT)
Dept: RADIOLOGY | Facility: MEDICAL CENTER | Age: 65
End: 2020-08-13
Attending: INTERNAL MEDICINE | Admitting: INTERNAL MEDICINE
Payer: COMMERCIAL

## 2020-08-13 DIAGNOSIS — R14.0 ABDOMINAL DISTENSION (GASEOUS): ICD-10-CM

## 2020-08-13 PROCEDURE — 74176 CT ABD & PELVIS W/O CONTRAST: CPT | Mod: ME

## 2020-08-14 DIAGNOSIS — M79.10 MYALGIA: ICD-10-CM

## 2020-08-14 DIAGNOSIS — M16.10 PRIMARY OSTEOARTHRITIS OF HIP, UNSPECIFIED LATERALITY: ICD-10-CM

## 2020-08-30 ENCOUNTER — APPOINTMENT (OUTPATIENT)
Dept: RADIOLOGY | Facility: MEDICAL CENTER | Age: 65
End: 2020-08-30
Attending: EMERGENCY MEDICINE
Payer: COMMERCIAL

## 2020-08-30 ENCOUNTER — APPOINTMENT (OUTPATIENT)
Dept: RADIOLOGY | Facility: MEDICAL CENTER | Age: 65
End: 2020-08-30
Attending: INTERNAL MEDICINE
Payer: COMMERCIAL

## 2020-08-30 ENCOUNTER — HOSPITAL ENCOUNTER (OUTPATIENT)
Facility: MEDICAL CENTER | Age: 65
End: 2020-08-31
Attending: EMERGENCY MEDICINE | Admitting: INTERNAL MEDICINE
Payer: COMMERCIAL

## 2020-08-30 DIAGNOSIS — R42 DIZZINESS: ICD-10-CM

## 2020-08-30 PROBLEM — R11.2 NAUSEA AND VOMITING: Status: ACTIVE | Noted: 2020-08-30

## 2020-08-30 PROBLEM — E87.20 LACTIC ACIDOSIS: Status: ACTIVE | Noted: 2020-08-30

## 2020-08-30 PROBLEM — R73.9 HYPERGLYCEMIA: Status: ACTIVE | Noted: 2020-08-30

## 2020-08-30 LAB
ALBUMIN SERPL BCP-MCNC: 4.5 G/DL (ref 3.2–4.9)
ALBUMIN/GLOB SERPL: 1.3 G/DL
ALP SERPL-CCNC: 131 U/L (ref 30–99)
ALT SERPL-CCNC: 12 U/L (ref 2–50)
ANION GAP SERPL CALC-SCNC: 16 MMOL/L (ref 7–16)
APPEARANCE UR: CLEAR
AST SERPL-CCNC: 14 U/L (ref 12–45)
BACTERIA #/AREA URNS HPF: ABNORMAL /HPF
BASOPHILS # BLD AUTO: 0.7 % (ref 0–1.8)
BASOPHILS # BLD: 0.08 K/UL (ref 0–0.12)
BILIRUB SERPL-MCNC: 0.4 MG/DL (ref 0.1–1.5)
BILIRUB UR QL STRIP.AUTO: NEGATIVE
BUN SERPL-MCNC: 18 MG/DL (ref 8–22)
CALCIUM SERPL-MCNC: 10 MG/DL (ref 8.4–10.2)
CHLORIDE SERPL-SCNC: 99 MMOL/L (ref 96–112)
CO2 SERPL-SCNC: 24 MMOL/L (ref 20–33)
COLOR UR: YELLOW
COVID ORDER STATUS COVID19: NORMAL
CREAT SERPL-MCNC: 0.64 MG/DL (ref 0.5–1.4)
EKG IMPRESSION: NORMAL
EOSINOPHIL # BLD AUTO: 0.2 K/UL (ref 0–0.51)
EOSINOPHIL NFR BLD: 1.7 % (ref 0–6.9)
EPI CELLS #/AREA URNS HPF: ABNORMAL /HPF
ERYTHROCYTE [DISTWIDTH] IN BLOOD BY AUTOMATED COUNT: 39.6 FL (ref 35.9–50)
GLOBULIN SER CALC-MCNC: 3.5 G/DL (ref 1.9–3.5)
GLUCOSE BLD-MCNC: 151 MG/DL (ref 65–99)
GLUCOSE BLD-MCNC: 261 MG/DL (ref 65–99)
GLUCOSE BLD-MCNC: 299 MG/DL (ref 65–99)
GLUCOSE BLD-MCNC: 373 MG/DL (ref 65–99)
GLUCOSE SERPL-MCNC: 419 MG/DL (ref 65–99)
GLUCOSE UR STRIP.AUTO-MCNC: >=1000 MG/DL
HCT VFR BLD AUTO: 43.9 % (ref 37–47)
HGB BLD-MCNC: 14.8 G/DL (ref 12–16)
HYALINE CASTS #/AREA URNS LPF: ABNORMAL /LPF
IMM GRANULOCYTES # BLD AUTO: 0.05 K/UL (ref 0–0.11)
IMM GRANULOCYTES NFR BLD AUTO: 0.4 % (ref 0–0.9)
KETONES UR STRIP.AUTO-MCNC: 15 MG/DL
LACTATE BLD-SCNC: 2.6 MMOL/L (ref 0.5–2)
LEUKOCYTE ESTERASE UR QL STRIP.AUTO: ABNORMAL
LIPASE SERPL-CCNC: 20 U/L (ref 7–58)
LYMPHOCYTES # BLD AUTO: 4.36 K/UL (ref 1–4.8)
LYMPHOCYTES NFR BLD: 38 % (ref 22–41)
MCH RBC QN AUTO: 28.7 PG (ref 27–33)
MCHC RBC AUTO-ENTMCNC: 33.7 G/DL (ref 33.6–35)
MCV RBC AUTO: 85.1 FL (ref 81.4–97.8)
MICRO URNS: ABNORMAL
MONOCYTES # BLD AUTO: 0.46 K/UL (ref 0–0.85)
MONOCYTES NFR BLD AUTO: 4 % (ref 0–13.4)
NEUTROPHILS # BLD AUTO: 6.32 K/UL (ref 2–7.15)
NEUTROPHILS NFR BLD: 55.2 % (ref 44–72)
NITRITE UR QL STRIP.AUTO: NEGATIVE
NRBC # BLD AUTO: 0 K/UL
NRBC BLD-RTO: 0 /100 WBC
NT-PROBNP SERPL IA-MCNC: 126 PG/ML (ref 0–125)
PH UR STRIP.AUTO: 7 [PH] (ref 5–8)
PLATELET # BLD AUTO: 243 K/UL (ref 164–446)
PMV BLD AUTO: 11.2 FL (ref 9–12.9)
POTASSIUM SERPL-SCNC: 3.5 MMOL/L (ref 3.6–5.5)
PROT SERPL-MCNC: 8 G/DL (ref 6–8.2)
PROT UR QL STRIP: NEGATIVE MG/DL
RBC # BLD AUTO: 5.16 M/UL (ref 4.2–5.4)
RBC # URNS HPF: ABNORMAL /HPF
RBC UR QL AUTO: NEGATIVE
SARS-COV-2 RNA RESP QL NAA+PROBE: NOTDETECTED
SODIUM SERPL-SCNC: 139 MMOL/L (ref 135–145)
SP GR UR STRIP.AUTO: 1.02
SPECIMEN SOURCE: NORMAL
TROPONIN T SERPL-MCNC: 7 NG/L (ref 6–19)
WBC # BLD AUTO: 11.5 K/UL (ref 4.8–10.8)
WBC #/AREA URNS HPF: ABNORMAL /HPF

## 2020-08-30 PROCEDURE — 700111 HCHG RX REV CODE 636 W/ 250 OVERRIDE (IP): Performed by: EMERGENCY MEDICINE

## 2020-08-30 PROCEDURE — 700105 HCHG RX REV CODE 258: Performed by: EMERGENCY MEDICINE

## 2020-08-30 PROCEDURE — 87040 BLOOD CULTURE FOR BACTERIA: CPT | Mod: 91

## 2020-08-30 PROCEDURE — 700111 HCHG RX REV CODE 636 W/ 250 OVERRIDE (IP): Performed by: INTERNAL MEDICINE

## 2020-08-30 PROCEDURE — 82962 GLUCOSE BLOOD TEST: CPT | Mod: 91

## 2020-08-30 PROCEDURE — 81001 URINALYSIS AUTO W/SCOPE: CPT

## 2020-08-30 PROCEDURE — G0378 HOSPITAL OBSERVATION PER HR: HCPCS

## 2020-08-30 PROCEDURE — 700105 HCHG RX REV CODE 258: Performed by: INTERNAL MEDICINE

## 2020-08-30 PROCEDURE — 83880 ASSAY OF NATRIURETIC PEPTIDE: CPT

## 2020-08-30 PROCEDURE — 99220 PR INITIAL OBSERVATION CARE,LEVL III: CPT | Performed by: INTERNAL MEDICINE

## 2020-08-30 PROCEDURE — 93005 ELECTROCARDIOGRAM TRACING: CPT | Performed by: EMERGENCY MEDICINE

## 2020-08-30 PROCEDURE — 700102 HCHG RX REV CODE 250 W/ 637 OVERRIDE(OP): Performed by: INTERNAL MEDICINE

## 2020-08-30 PROCEDURE — 80053 COMPREHEN METABOLIC PANEL: CPT

## 2020-08-30 PROCEDURE — 71045 X-RAY EXAM CHEST 1 VIEW: CPT

## 2020-08-30 PROCEDURE — 99285 EMERGENCY DEPT VISIT HI MDM: CPT

## 2020-08-30 PROCEDURE — 70551 MRI BRAIN STEM W/O DYE: CPT

## 2020-08-30 PROCEDURE — C9803 HOPD COVID-19 SPEC COLLECT: HCPCS | Performed by: EMERGENCY MEDICINE

## 2020-08-30 PROCEDURE — 83605 ASSAY OF LACTIC ACID: CPT

## 2020-08-30 PROCEDURE — 83690 ASSAY OF LIPASE: CPT

## 2020-08-30 PROCEDURE — 70450 CT HEAD/BRAIN W/O DYE: CPT

## 2020-08-30 PROCEDURE — A9270 NON-COVERED ITEM OR SERVICE: HCPCS | Performed by: INTERNAL MEDICINE

## 2020-08-30 PROCEDURE — 85025 COMPLETE CBC W/AUTO DIFF WBC: CPT

## 2020-08-30 PROCEDURE — 96372 THER/PROPH/DIAG INJ SC/IM: CPT

## 2020-08-30 PROCEDURE — U0003 INFECTIOUS AGENT DETECTION BY NUCLEIC ACID (DNA OR RNA); SEVERE ACUTE RESPIRATORY SYNDROME CORONAVIRUS 2 (SARS-COV-2) (CORONAVIRUS DISEASE [COVID-19]), AMPLIFIED PROBE TECHNIQUE, MAKING USE OF HIGH THROUGHPUT TECHNOLOGIES AS DESCRIBED BY CMS-2020-01-R: HCPCS

## 2020-08-30 PROCEDURE — 700102 HCHG RX REV CODE 250 W/ 637 OVERRIDE(OP): Performed by: EMERGENCY MEDICINE

## 2020-08-30 PROCEDURE — 84484 ASSAY OF TROPONIN QUANT: CPT

## 2020-08-30 PROCEDURE — A9270 NON-COVERED ITEM OR SERVICE: HCPCS | Performed by: EMERGENCY MEDICINE

## 2020-08-30 PROCEDURE — 96374 THER/PROPH/DIAG INJ IV PUSH: CPT

## 2020-08-30 RX ORDER — MECLIZINE HYDROCHLORIDE 25 MG/1
25 TABLET ORAL 3 TIMES DAILY PRN
Status: DISCONTINUED | OUTPATIENT
Start: 2020-08-30 | End: 2020-08-31 | Stop reason: HOSPADM

## 2020-08-30 RX ORDER — POLYETHYLENE GLYCOL 3350 17 G/17G
1 POWDER, FOR SOLUTION ORAL
Status: DISCONTINUED | OUTPATIENT
Start: 2020-08-30 | End: 2020-08-31 | Stop reason: HOSPADM

## 2020-08-30 RX ORDER — ONDANSETRON 2 MG/ML
4 INJECTION INTRAMUSCULAR; INTRAVENOUS EVERY 4 HOURS PRN
Status: DISCONTINUED | OUTPATIENT
Start: 2020-08-30 | End: 2020-08-31 | Stop reason: HOSPADM

## 2020-08-30 RX ORDER — SODIUM CHLORIDE 9 MG/ML
1000 INJECTION, SOLUTION INTRAVENOUS ONCE
Status: COMPLETED | OUTPATIENT
Start: 2020-08-30 | End: 2020-08-30

## 2020-08-30 RX ORDER — AMOXICILLIN AND CLAVULANATE POTASSIUM 875; 125 MG/1; MG/1
1 TABLET, FILM COATED ORAL 2 TIMES DAILY
Status: ON HOLD | COMMUNITY
Start: 2020-08-15 | End: 2020-08-31

## 2020-08-30 RX ORDER — IBUPROFEN 200 MG
400 TABLET ORAL EVERY 6 HOURS PRN
COMMUNITY
End: 2023-04-04

## 2020-08-30 RX ORDER — PROCHLORPERAZINE EDISYLATE 5 MG/ML
10 INJECTION INTRAMUSCULAR; INTRAVENOUS EVERY 6 HOURS PRN
Status: DISCONTINUED | OUTPATIENT
Start: 2020-08-30 | End: 2020-08-31 | Stop reason: HOSPADM

## 2020-08-30 RX ORDER — ONDANSETRON 2 MG/ML
4 INJECTION INTRAMUSCULAR; INTRAVENOUS ONCE
Status: COMPLETED | OUTPATIENT
Start: 2020-08-30 | End: 2020-08-30

## 2020-08-30 RX ORDER — SODIUM CHLORIDE 9 MG/ML
INJECTION, SOLUTION INTRAVENOUS CONTINUOUS
Status: DISCONTINUED | OUTPATIENT
Start: 2020-08-30 | End: 2020-08-31 | Stop reason: HOSPADM

## 2020-08-30 RX ORDER — OMEPRAZOLE 20 MG/1
20 CAPSULE, DELAYED RELEASE ORAL DAILY
Status: DISCONTINUED | OUTPATIENT
Start: 2020-08-30 | End: 2020-08-31 | Stop reason: HOSPADM

## 2020-08-30 RX ORDER — INSULIN GLARGINE 100 [IU]/ML
0.2 INJECTION, SOLUTION SUBCUTANEOUS EVERY EVENING
Status: DISCONTINUED | OUTPATIENT
Start: 2020-08-30 | End: 2020-08-31 | Stop reason: HOSPADM

## 2020-08-30 RX ORDER — DEXTROSE MONOHYDRATE 25 G/50ML
50 INJECTION, SOLUTION INTRAVENOUS
Status: DISCONTINUED | OUTPATIENT
Start: 2020-08-30 | End: 2020-08-31 | Stop reason: HOSPADM

## 2020-08-30 RX ORDER — ONDANSETRON 4 MG/1
4 TABLET, ORALLY DISINTEGRATING ORAL EVERY 4 HOURS PRN
Status: DISCONTINUED | OUTPATIENT
Start: 2020-08-30 | End: 2020-08-31 | Stop reason: HOSPADM

## 2020-08-30 RX ORDER — BISACODYL 10 MG
10 SUPPOSITORY, RECTAL RECTAL
Status: DISCONTINUED | OUTPATIENT
Start: 2020-08-30 | End: 2020-08-31 | Stop reason: HOSPADM

## 2020-08-30 RX ORDER — AMOXICILLIN 250 MG
2 CAPSULE ORAL 2 TIMES DAILY
Status: DISCONTINUED | OUTPATIENT
Start: 2020-08-30 | End: 2020-08-31 | Stop reason: HOSPADM

## 2020-08-30 RX ORDER — MECLIZINE HYDROCHLORIDE 25 MG/1
25 TABLET ORAL ONCE
Status: COMPLETED | OUTPATIENT
Start: 2020-08-30 | End: 2020-08-30

## 2020-08-30 RX ADMIN — INSULIN GLARGINE 15 UNITS: 100 INJECTION, SOLUTION SUBCUTANEOUS at 17:59

## 2020-08-30 RX ADMIN — SENNOSIDES-DOCUSATE SODIUM TAB 8.6-50 MG 2 TABLET: 8.6-5 TAB at 18:03

## 2020-08-30 RX ADMIN — INSULIN LISPRO 5 UNITS: 100 INJECTION, SOLUTION INTRAVENOUS; SUBCUTANEOUS at 17:57

## 2020-08-30 RX ADMIN — ENOXAPARIN SODIUM 40 MG: 40 INJECTION SUBCUTANEOUS at 10:30

## 2020-08-30 RX ADMIN — SODIUM CHLORIDE 1000 ML: 9 INJECTION, SOLUTION INTRAVENOUS at 06:37

## 2020-08-30 RX ADMIN — MECLIZINE HYDROCHLORIDE 25 MG: 25 TABLET ORAL at 07:39

## 2020-08-30 RX ADMIN — SODIUM CHLORIDE: 9 INJECTION, SOLUTION INTRAVENOUS at 10:30

## 2020-08-30 RX ADMIN — SODIUM CHLORIDE: 9 INJECTION, SOLUTION INTRAVENOUS at 23:20

## 2020-08-30 RX ADMIN — SENNOSIDES-DOCUSATE SODIUM TAB 8.6-50 MG 2 TABLET: 8.6-5 TAB at 10:33

## 2020-08-30 RX ADMIN — OMEPRAZOLE 20 MG: 20 CAPSULE, DELAYED RELEASE ORAL at 10:32

## 2020-08-30 RX ADMIN — ONDANSETRON 4 MG: 2 INJECTION INTRAMUSCULAR; INTRAVENOUS at 06:50

## 2020-08-30 RX ADMIN — LIDOCAINE HYDROCHLORIDE 30 ML: 20 SOLUTION OROPHARYNGEAL at 10:31

## 2020-08-30 ASSESSMENT — LIFESTYLE VARIABLES
EVER HAD A DRINK FIRST THING IN THE MORNING TO STEADY YOUR NERVES TO GET RID OF A HANGOVER: NO
AVERAGE NUMBER OF DAYS PER WEEK YOU HAVE A DRINK CONTAINING ALCOHOL: 0
ALCOHOL_USE: NO
TOTAL SCORE: 0
HAVE YOU EVER FELT YOU SHOULD CUT DOWN ON YOUR DRINKING: NO
EVER_SMOKED: NEVER
HOW MANY TIMES IN THE PAST YEAR HAVE YOU HAD 5 OR MORE DRINKS IN A DAY: 0
TOTAL SCORE: 0
TOTAL SCORE: 0
CONSUMPTION TOTAL: NEGATIVE
EVER FELT BAD OR GUILTY ABOUT YOUR DRINKING: NO
HAVE PEOPLE ANNOYED YOU BY CRITICIZING YOUR DRINKING: NO
ON A TYPICAL DAY WHEN YOU DRINK ALCOHOL HOW MANY DRINKS DO YOU HAVE: 0

## 2020-08-30 ASSESSMENT — ENCOUNTER SYMPTOMS
NAUSEA: 1
NERVOUS/ANXIOUS: 0
CONSTIPATION: 0
INSOMNIA: 0
ABDOMINAL PAIN: 1
SENSORY CHANGE: 0
DIARRHEA: 0
SHORTNESS OF BREATH: 0
WEAKNESS: 0
SORE THROAT: 0
MYALGIAS: 0
PHOTOPHOBIA: 0
CHILLS: 0
FEVER: 0
VOMITING: 1
DIZZINESS: 1
CLAUDICATION: 0
BLURRED VISION: 0
COUGH: 0
HEARTBURN: 1
HEADACHES: 0
DEPRESSION: 0
SPEECH CHANGE: 0

## 2020-08-30 ASSESSMENT — COGNITIVE AND FUNCTIONAL STATUS - GENERAL
SUGGESTED CMS G CODE MODIFIER DAILY ACTIVITY: CH
DAILY ACTIVITIY SCORE: 24
MOBILITY SCORE: 24
SUGGESTED CMS G CODE MODIFIER MOBILITY: CH

## 2020-08-30 ASSESSMENT — PATIENT HEALTH QUESTIONNAIRE - PHQ9
9. THOUGHTS THAT YOU WOULD BE BETTER OFF DEAD, OR OF HURTING YOURSELF: NOT AT ALL
8. MOVING OR SPEAKING SO SLOWLY THAT OTHER PEOPLE COULD HAVE NOTICED. OR THE OPPOSITE, BEING SO FIGETY OR RESTLESS THAT YOU HAVE BEEN MOVING AROUND A LOT MORE THAN USUAL: NOT AT ALL
6. FEELING BAD ABOUT YOURSELF - OR THAT YOU ARE A FAILURE OR HAVE LET YOURSELF OR YOUR FAMILY DOWN: NOT AL ALL
SUM OF ALL RESPONSES TO PHQ9 QUESTIONS 1 AND 2: 4
SUM OF ALL RESPONSES TO PHQ QUESTIONS 1-9: 7
5. POOR APPETITE OR OVEREATING: NOT AT ALL
2. FEELING DOWN, DEPRESSED, IRRITABLE, OR HOPELESS: MORE THAN HALF THE DAYS
1. LITTLE INTEREST OR PLEASURE IN DOING THINGS: MORE THAN HALF THE DAYS
7. TROUBLE CONCENTRATING ON THINGS, SUCH AS READING THE NEWSPAPER OR WATCHING TELEVISION: SEVERAL DAYS
4. FEELING TIRED OR HAVING LITTLE ENERGY: SEVERAL DAYS
3. TROUBLE FALLING OR STAYING ASLEEP OR SLEEPING TOO MUCH: SEVERAL DAYS

## 2020-08-30 ASSESSMENT — FIBROSIS 4 INDEX
FIB4 SCORE: 1.52
FIB4 SCORE: 1.06

## 2020-08-30 NOTE — ED PROVIDER NOTES
ED Provider Note    CHIEF COMPLAINT  Chief Complaint   Patient presents with   • N/V   • Dizziness       HPI  Domonique Galeano is a 65 y.o. female who presents with shortness of breath.  The patient has not felt well since early this morning.  She woke up feeling dizzy.  She had subjective fevers.  She is also had increased work of breathing.  She is had no known sick contacts and has been isolating at home.  She does have a slight cough.  She also presents with epigastric pain and vomiting.  Her blood sugars have been elevated.  She has no change in bowel or bladder function.    REVIEW OF SYSTEMS  See HPI for further details. All other systems are negative.     PAST MEDICAL HISTORY  Past Medical History:   Diagnosis Date   • Diabetic peripheral neuropathy (HCC) 8/4/2020   • Hyperlipidemia LDL goal < 70 6/12/2015   • Type II or unspecified type diabetes mellitus without mention of complication, uncontrolled 4/13/2015       FAMILY HISTORY  [unfilled]    SOCIAL HISTORY  Social History     Socioeconomic History   • Marital status:      Spouse name: Not on file   • Number of children: Not on file   • Years of education: Not on file   • Highest education level: Not on file   Occupational History   • Not on file   Social Needs   • Financial resource strain: Not on file   • Food insecurity     Worry: Not on file     Inability: Not on file   • Transportation needs     Medical: Not on file     Non-medical: Not on file   Tobacco Use   • Smoking status: Never Smoker   • Smokeless tobacco: Never Used   Substance and Sexual Activity   • Alcohol use: No   • Drug use: No   • Sexual activity: Yes     Partners: Male     Comment:  x 36   Lifestyle   • Physical activity     Days per week: Not on file     Minutes per session: Not on file   • Stress: Not on file   Relationships   • Social connections     Talks on phone: Not on file     Gets together: Not on file     Attends Episcopalian service: Not on file      "Active member of club or organization: Not on file     Attends meetings of clubs or organizations: Not on file     Relationship status: Not on file   • Intimate partner violence     Fear of current or ex partner: Not on file     Emotionally abused: Not on file     Physically abused: Not on file     Forced sexual activity: Not on file   Other Topics Concern   • Not on file   Social History Narrative   • Not on file       SURGICAL HISTORY  Past Surgical History:   Procedure Laterality Date   • APPENDECTOMY         CURRENT MEDICATIONS  Home Medications     Reviewed by Robinson Tejada R.N. (Registered Nurse) on 08/30/20 at 0628  Med List Status: <None>   Medication Last Dose Status   Alcohol Swabs  Active   atorvastatin (LIPITOR) 20 MG Tab  Active   Blood Glucose Meter Kit  Active   Blood Glucose Test Strips  Active   clotrimazole-betamethasone (LOTRISONE) 1-0.05 % CREA  Active   glipiZIDE (GLUCOTROL) 10 MG Tab  Active   insulin detemir (LEVEMIR) 100 UNIT/ML injection PEN  Active   Insulin Pen Needle 32 G x 4 mm  Active   Lancets  Active   lisinopril (PRINIVIL) 2.5 MG Tab  Active   metFORMIN (GLUCOPHAGE) 500 MG Tab  Active   miconazole (MICOTIN) 2 % Cream  Active   Misc. Devices (WHEELCHAIR) Misc  Active   omeprazole (PRILOSEC) 20 MG delayed-release capsule  Active   omeprazole (PRILOSEC) 20 MG delayed-release capsule  Active   omeprazole (PRILOSEC) 40 MG delayed-release capsule  Active   ondansetron (ZOFRAN) 4 MG Tab tablet  Active   ondansetron (ZOFRAN) 4 MG Tab tablet  Active   ONE TOUCH ULTRA TEST strip  Active   polyethylene glycol 3350 (MIRALAX) Powder  Active   sitagliptin (JANUVIA) 50 MG Tab  Active                ALLERGIES  No Known Allergies    PHYSICAL EXAM  VITAL SIGNS: BP (!) 179/79 Comment: Simultaneous filing. User may not have seen previous data.  Pulse 86 Comment: Simultaneous filing. User may not have seen previous data.  Resp (!) 22   Ht 1.6 m (5' 3\")   Wt 73.5 kg (162 lb)   SpO2 97%   BMI " 28.70 kg/m²       Constitutional: Patient appears ill.   HENT: Normocephalic, Atraumatic, Bilateral external ears normal, Oropharynx moist, No oral exudates, Nose normal.   Eyes: PERRLA, EOMI, Conjunctiva normal, No discharge.   Neck: Normal range of motion, No tenderness, Supple, No stridor.   Lymphatic: No lymphadenopathy noted.   Cardiovascular: Normal heart rate, Normal rhythm, No murmurs, No rubs, No gallops.   Thorax & Lungs: Normal breath sounds, No respiratory distress, No wheezing, No chest tenderness.   Abdomen: Bowel sounds normal, Soft, epigastric tenderness, No masses, No pulsatile masses.   Skin: Warm, Dry, No erythema, No rash.   Back: No tenderness, No CVA tenderness.   Extremities: Intact distal pulses, No edema, No tenderness, No cyanosis, No clubbing. .   Neurologic: Alert & oriented x 3, Normal motor function, Normal sensory function, No focal deficits noted.   Psychiatric: Affect normal, Judgment normal, Mood normal.     Results for orders placed or performed during the hospital encounter of 08/30/20   CBC WITH DIFFERENTIAL   Result Value Ref Range    WBC 11.5 (H) 4.8 - 10.8 K/uL    RBC 5.16 4.20 - 5.40 M/uL    Hemoglobin 14.8 12.0 - 16.0 g/dL    Hematocrit 43.9 37.0 - 47.0 %    MCV 85.1 81.4 - 97.8 fL    MCH 28.7 27.0 - 33.0 pg    MCHC 33.7 33.6 - 35.0 g/dL    RDW 39.6 35.9 - 50.0 fL    Platelet Count 243 164 - 446 K/uL    MPV 11.2 9.0 - 12.9 fL    Neutrophils-Polys 55.20 44.00 - 72.00 %    Lymphocytes 38.00 22.00 - 41.00 %    Monocytes 4.00 0.00 - 13.40 %    Eosinophils 1.70 0.00 - 6.90 %    Basophils 0.70 0.00 - 1.80 %    Immature Granulocytes 0.40 0.00 - 0.90 %    Nucleated RBC 0.00 /100 WBC    Neutrophils (Absolute) 6.32 2.00 - 7.15 K/uL    Lymphs (Absolute) 4.36 1.00 - 4.80 K/uL    Monos (Absolute) 0.46 0.00 - 0.85 K/uL    Eos (Absolute) 0.20 0.00 - 0.51 K/uL    Baso (Absolute) 0.08 0.00 - 0.12 K/uL    Immature Granulocytes (abs) 0.05 0.00 - 0.11 K/uL    NRBC (Absolute) 0.00 K/uL   COMP  METABOLIC PANEL   Result Value Ref Range    Sodium 139 135 - 145 mmol/L    Potassium 3.5 (L) 3.6 - 5.5 mmol/L    Chloride 99 96 - 112 mmol/L    Co2 24 20 - 33 mmol/L    Anion Gap 16.0 7.0 - 16.0    Glucose 419 (H) 65 - 99 mg/dL    Bun 18 8 - 22 mg/dL    Creatinine 0.64 0.50 - 1.40 mg/dL    Calcium 10.0 8.4 - 10.2 mg/dL    AST(SGOT) 14 12 - 45 U/L    ALT(SGPT) 12 2 - 50 U/L    Alkaline Phosphatase 131 (H) 30 - 99 U/L    Total Bilirubin 0.4 0.1 - 1.5 mg/dL    Albumin 4.5 3.2 - 4.9 g/dL    Total Protein 8.0 6.0 - 8.2 g/dL    Globulin 3.5 1.9 - 3.5 g/dL    A-G Ratio 1.3 g/dL   LIPASE   Result Value Ref Range    Lipase 20 7 - 58 U/L   TROPONIN   Result Value Ref Range    Troponin T 7 6 - 19 ng/L   proBrain Natriuretic Peptide, NT   Result Value Ref Range    NT-proBNP 126 (H) 0 - 125 pg/mL   COVID/SARS CoV-2 PCR    Specimen: Nasopharyngeal; Respirate   Result Value Ref Range    COVID Order Status Received    LACTIC ACID   Result Value Ref Range    Lactic Acid 2.6 (H) 0.5 - 2.0 mmol/L   SARS-CoV-2, PCR (In-House)   Result Value Ref Range    SARS-CoV-2 Source NP Swab     SARS-CoV-2 by PCR NotDetected    ESTIMATED GFR   Result Value Ref Range    GFR If African American >60 >60 mL/min/1.73 m 2    GFR If Non African American >60 >60 mL/min/1.73 m 2   ACCU-CHEK GLUCOSE   Result Value Ref Range    Glucose - Accu-Ck 373 (H) 65 - 99 mg/dL   EKG (NOW)   Result Value Ref Range    Report       [facility] Emergency Dept.    Test Date:  2020  Pt Name:    SIERRA RUIZ         Department: [facility]  MRN:        [id number]                      [facility]  Gender:     Female                       Technician: JLUIS  :        [id number]                   Requested By:PATRICK CHAMPION  Order #:    [id number]                    Reading MD: PATRICK CHAMPION MD    Measurements  Intervals                                Axis  Rate:       73                           P:          14  AL:         119                           QRS:        36  QRSD:       108                          T:          28  QT:         388  QTc:        428    Interpretive Statements  Twelve-lead EKG shows a normal sinus rhythm with a ventricular to 73, normal  QRS, normal intervals, no ST segment elevation or depression, normal T waves.  Electronically Signed On 8- 7:48:24 PDT by PATRICK CHAMPION MD           RADIOLOGY/PROCEDURES  CT-HEAD W/O   Final Result      Head CT without contrast within normal limits. No evidence of acute cerebral infarction, hemorrhage or mass lesion.      DX-CHEST-PORTABLE (1 VIEW)   Final Result      No acute cardiac or pulmonary abnormalities are identified.            COURSE & MEDICAL DECISION MAKING  Pertinent Labs & Imaging studies reviewed. (See chart for details)  This a 65-year-old female who presents the emerge department with multiple complaints.  In reexamine the patient her biggest concern on the secondary evaluation was her dizziness.  She did describe vertigo and therefore meclizine was administered.  The patient is neurologically intact at this time.  CT scan head does not show any evidence of an intracranial source.  I cannot rule out posterior circulation insufficiency causing her dizziness and will admit the patient to the hospital for further evaluation.  Furthermore she continues to be nauseated despite Zofran and with her diabetes she could get quite ill on an outpatient basis if she continues to have emesis.  The patient's blood sugar is slightly elevated and this could also be playing a role in her presenting symptoms.  As for her shortness of breath the chest x-ray is clear and her lungs are clear.  Therefore pneumonia would be unlikely.  I did perform a COVID swab which was negative.  All of her presenting symptoms could also be from a viral source that she does have multiple system involvement.  Her abdomen is benign except for some epigastric pain and she could have some gastritis from  her vomiting although I suspect her vomit is more from her vertigo.  The patient does not appear septic.  Clinically do not appreciate any evidence of a focal bacterial infection.  The patient did receive a liter of fluid as she had vomiting and an elevated blood sugar on arrival.  This did not cause any significant change.  The patient will be admitted to the hospital for further work-up she is currently hemodynamically stable.    FINAL IMPRESSION  1.  Dizziness  2.  Poorly controlled diabetes mellitus  3.  Dehydration  4.  Dyspnea    Disposition  The patient will be admitted in stable condition         Electronically signed by: Jabari Christensen M.D., 8/30/2020 6:31 AM

## 2020-08-30 NOTE — CARE PLAN
Problem: Communication  Goal: The ability to communicate needs accurately and effectively will improve  Outcome: PROGRESSING AS EXPECTED  Note: Pt will be cooperative with cares this shift. Plan of care discussed with patient and daughter. Questions answered, verbalizes understanding.      Problem: Safety  Goal: Will remain free from injury  Outcome: PROGRESSING AS EXPECTED  Note: Patient will remain free from falls/injuries this shift. Safety measures in place. Pt calls appropriately.

## 2020-08-30 NOTE — ED TRIAGE NOTES
C/o dizziness and nausea since 4 am when woke up. Vomiting here in ED. Diaphoretic. Unable to obtain accurate temperature at this time. Denies fevers at home. Denies GI or  concerns. C/o mild abdominal pain  BS: 373

## 2020-08-30 NOTE — ASSESSMENT & PLAN NOTE
Likely source of majority of symptoms, pre-dka  Glucose 419  Aggressive IV hydration  Lantus and ISS with meals

## 2020-08-30 NOTE — H&P
Hospital Medicine History & Physical Note    Date of Service  8/30/2020    Primary Care Physician  Mena Christensen M.D.    Consultants  none    Code Status  Full Code    Chief Complaint  Chief Complaint   Patient presents with   • N/V   • Dizziness       History of Presenting Illness  65 y.o. female who presented 8/30/2020 with nausea, vomiting and dizziness.  She has diabetes with poor control based on chart review and presented with blood glucose >400.  She had a fall 2 weeks ago and symptoms of head pain and tenderness resolved.  At 4 am this morning, she woke up vomiting and started having dizziness whenever she would look to the right.  Daughter at bedside also states that since that time her speech has been slightly slower and her memory hasn't been as good.  Patient states she has stomach gastritis as a chronic issue but has had persistent nausea despite medications since this am at 4.  Patient has no prior neurological symptoms and denies history of stroke.    Review of Systems  Review of Systems   Constitutional: Negative for chills and fever.   HENT: Negative for congestion and sore throat.    Eyes: Negative for blurred vision and photophobia.   Respiratory: Negative for cough and shortness of breath.    Cardiovascular: Negative for chest pain, claudication and leg swelling.   Gastrointestinal: Positive for abdominal pain, heartburn, nausea and vomiting. Negative for constipation and diarrhea.   Genitourinary: Negative for dysuria and hematuria.   Musculoskeletal: Negative for joint pain and myalgias.   Skin: Negative for itching and rash.   Neurological: Positive for dizziness (when looking to right). Negative for sensory change, speech change, weakness and headaches.   Psychiatric/Behavioral: Negative for depression. The patient is not nervous/anxious and does not have insomnia.        Past Medical History   has a past medical history of Diabetic peripheral neuropathy (HCC) (8/4/2020), Hyperlipidemia LDL  goal < 70 (6/12/2015), and Type II or unspecified type diabetes mellitus without mention of complication, uncontrolled (4/13/2015).    Surgical History   has a past surgical history that includes appendectomy.     Family History  family history includes Diabetes in her maternal grandmother, mother, and sister.     Social History   reports that she has never smoked. She has never used smokeless tobacco. She reports that she does not drink alcohol or use drugs.    Allergies  No Known Allergies    Medications  Prior to Admission Medications   Prescriptions Last Dose Informant Patient Reported? Taking?   amoxicillin-clavulanate (AUGMENTIN) 875-125 MG Tab 8/24/2020 at Finished  Patient Yes Yes   Sig: Take 1 Tab by mouth 2 times a day. Pt started on 8/15/2020 stephens 10 day course   ibuprofen (MOTRIN) 200 MG Tab 8/27/2020 at PM Patient Yes Yes   Sig: Take 400 mg by mouth every 6 hours as needed for Mild Pain.   insulin detemir (LEVEMIR) 100 UNIT/ML Solution 8/29/2020 at 0900 Patient Yes Yes   Sig: Inject 10 Units as instructed every day.   metFORMIN (GLUCOPHAGE) 500 MG Tab 8/24/2020 at PM Patient Yes Yes   Sig: Take 1,000 mg by mouth 2 times a day, with meals.   miconazole (MICOTIN) 2 % Cream 8/29/2020 at 1400 Patient Yes Yes   Sig: Apply 1 Application to affected area(s) as needed (For rash). Apply's on feet      Facility-Administered Medications: None       Physical Exam  Temp:  [36.2 °C (97.2 °F)-36.4 °C (97.6 °F)] 36.4 °C (97.6 °F)  Pulse:  [76-86] 83  Resp:  [17-22] 17  BP: (164-181)/(64-83) 164/64  SpO2:  [95 %-99 %] 96 %    Physical Exam  Vitals signs and nursing note reviewed.   Constitutional:       General: She is not in acute distress.     Appearance: Normal appearance. She is not ill-appearing.   HENT:      Head: Normocephalic and atraumatic.      Nose: Nose normal.   Eyes:      General: No scleral icterus.  Neck:      Musculoskeletal: Neck supple.   Cardiovascular:      Rate and Rhythm: Normal rate and regular  rhythm.      Heart sounds: Normal heart sounds. No murmur.   Pulmonary:      Effort: Pulmonary effort is normal.      Breath sounds: Normal breath sounds.   Abdominal:      General: Bowel sounds are normal. There is no distension.      Palpations: Abdomen is soft.   Musculoskeletal:         General: No swelling or tenderness.   Skin:     General: Skin is warm and dry.   Neurological:      General: No focal deficit present.      Mental Status: She is alert and oriented to person, place, and time.   Psychiatric:         Mood and Affect: Mood normal.         Laboratory:  Recent Labs     08/30/20  0634   WBC 11.5*   RBC 5.16   HEMOGLOBIN 14.8   HEMATOCRIT 43.9   MCV 85.1   MCH 28.7   MCHC 33.7   RDW 39.6   PLATELETCT 243   MPV 11.2     Recent Labs     08/30/20  0634   SODIUM 139   POTASSIUM 3.5*   CHLORIDE 99   CO2 24   GLUCOSE 419*   BUN 18   CREATININE 0.64   CALCIUM 10.0     Recent Labs     08/30/20  0634   ALTSGPT 12   ASTSGOT 14   ALKPHOSPHAT 131*   TBILIRUBIN 0.4   LIPASE 20   GLUCOSE 419*         Recent Labs     08/30/20  0634   NTPROBNP 126*         Recent Labs     08/30/20  0634   TROPONINT 7       Imaging:  CT-HEAD W/O   Final Result      Head CT without contrast within normal limits. No evidence of acute cerebral infarction, hemorrhage or mass lesion.      DX-CHEST-PORTABLE (1 VIEW)   Final Result      No acute cardiac or pulmonary abnormalities are identified.      MR-BRAIN-W/O    (Results Pending)         Assessment/Plan:  I anticipate this patient is appropriate for observation status at this time.    * Postural dizziness with presyncope- (present on admission)  Assessment & Plan  Fall 2 weeks ago  CT head normal  MRI brain pending  antivert PRN, tele monitoring      Nausea and vomiting  Assessment & Plan  Antiemetics  Likley due to hyperglycemia      Lactic acidosis  Assessment & Plan  Secondary to n/v/dehydration  No signs of sepsis  Continue IV hydration      Hyperglycemia  Assessment & Plan  Likely  source of majority of symptoms, pre-dka  Glucose 419  Aggressive IV hydration  Lantus and ISS with meals      GERD (gastroesophageal reflux disease)- (present on admission)  Assessment & Plan  prilosec  GI cocktail once      Type II diabetes mellitus, uncontrolled (HCC)- (present on admission)  Assessment & Plan  Last A1C 15  Needs better control, only on Levimir qam and not taking metformin  Lantus and ISS

## 2020-08-30 NOTE — ED NOTES
Med rec updated and complete  Allergies reviewed  Interviewed pt with daughter at bedside with permission from pt.  Pts daughter had a list of medications at bedside, went over list of medications and returned list of medications back to pts daughter   Pt reports no vitamins

## 2020-08-31 VITALS
DIASTOLIC BLOOD PRESSURE: 57 MMHG | OXYGEN SATURATION: 97 % | SYSTOLIC BLOOD PRESSURE: 124 MMHG | TEMPERATURE: 98.4 F | HEIGHT: 63 IN | WEIGHT: 170.42 LBS | HEART RATE: 76 BPM | BODY MASS INDEX: 30.2 KG/M2 | RESPIRATION RATE: 18 BRPM

## 2020-08-31 LAB — GLUCOSE BLD-MCNC: 133 MG/DL (ref 65–99)

## 2020-08-31 PROCEDURE — 700111 HCHG RX REV CODE 636 W/ 250 OVERRIDE (IP): Performed by: INTERNAL MEDICINE

## 2020-08-31 PROCEDURE — 96372 THER/PROPH/DIAG INJ SC/IM: CPT

## 2020-08-31 PROCEDURE — G0378 HOSPITAL OBSERVATION PER HR: HCPCS

## 2020-08-31 PROCEDURE — A9270 NON-COVERED ITEM OR SERVICE: HCPCS | Performed by: INTERNAL MEDICINE

## 2020-08-31 PROCEDURE — 700105 HCHG RX REV CODE 258: Performed by: INTERNAL MEDICINE

## 2020-08-31 PROCEDURE — 700102 HCHG RX REV CODE 250 W/ 637 OVERRIDE(OP): Performed by: INTERNAL MEDICINE

## 2020-08-31 PROCEDURE — 82962 GLUCOSE BLOOD TEST: CPT

## 2020-08-31 PROCEDURE — 99217 PR OBSERVATION CARE DISCHARGE: CPT | Performed by: INTERNAL MEDICINE

## 2020-08-31 RX ORDER — ASPIRIN 81 MG/1
81 TABLET, CHEWABLE ORAL DAILY
Qty: 100 TAB | COMMUNITY
Start: 2020-08-31 | End: 2020-09-15 | Stop reason: SDUPTHER

## 2020-08-31 RX ORDER — INSULIN DETEMIR 100 [IU]/ML
15 INJECTION, SOLUTION SUBCUTANEOUS EVERY MORNING
Qty: 1 EACH | Refills: 1 | Status: SHIPPED | OUTPATIENT
Start: 2020-08-31 | End: 2020-11-20

## 2020-08-31 RX ADMIN — INSULIN LISPRO 5 UNITS: 100 INJECTION, SOLUTION INTRAVENOUS; SUBCUTANEOUS at 06:00

## 2020-08-31 RX ADMIN — ENOXAPARIN SODIUM 40 MG: 40 INJECTION SUBCUTANEOUS at 05:54

## 2020-08-31 RX ADMIN — SENNOSIDES-DOCUSATE SODIUM TAB 8.6-50 MG 2 TABLET: 8.6-5 TAB at 05:54

## 2020-08-31 RX ADMIN — OMEPRAZOLE 20 MG: 20 CAPSULE, DELAYED RELEASE ORAL at 05:54

## 2020-08-31 RX ADMIN — SODIUM CHLORIDE: 9 INJECTION, SOLUTION INTRAVENOUS at 08:30

## 2020-08-31 NOTE — DISCHARGE INSTRUCTIONS
Mareos  Dizziness  Los mareos son un problema muy frecuente. Se trata de jasiel sensación de inestabilidad o desvanecimiento. Puede sentir que se va a desmayar. Los mareos pueden provocarle jasiel lesión si se tropieza o se . Las personas de todas las edades pueden sufrir mareos, faviola es más frecuente en los adultos mayores. Esta afección puede tener muchas causas, entre las que se pueden mencionar los medicamentos, la deshidratación y las enfermedades.  Siga estas indicaciones en rice casa:  Comida y bebida  · Keren suficiente líquido kinga para mantener la orina shayla o de color amarillo pálido. Short miguel la deshidratación. Trate de beber más líquidos transparentes, kinga agua.  · No keren alcohol.  · Limite el consumo de cafeína si el médico se lo indica. Verifique los ingredientes y la información nutricional para saber si un alimento o jasiel bebida contienen cafeína.  · Limite el consumo de sal (sodio) si el médico se lo indica. Verifique los ingredientes y la información nutricional para saber si un alimento o jasiel bebida contienen sodio.  Actividad  · Evite los movimientos rápidos.  ? Levántese de las анна con lentitud y apóyese hasta sentirse amy.  ? Por la mañana, siéntese lakshmi a un lado de la cama. Cuando se sienta amy, póngase lentamente de pie mientras se sostiene de algo, hasta que sepa que ha logrado el equilibrio.  · Mueva las piernas con frecuencia si debe estar de pie en un lugar lisa mucho tiempo. Mientras esté de pie, contraiga y relaje los músculos de las piernas.  · No conduzca vehículos ni opere maquinaria pesada si se siente mareado.  · Evite agacharse si se siente mareado. En rice casa, coloque los objetos de modo que le resulte fácil alcanzarlos sin agacharse.  Estilo de loreta  · No consuma ningún producto que contenga nicotina o tabaco, kinga cigarrillos y cigarrillos electrónicos. Si necesita ayuda para dejar de fumar, consulte al médico.  · Trate de reducir el nivel de estrés con métodos  kinga el yoga o la meditación. Hable con el médico si necesita ayuda para controlar el nivel de estrés.  Instrucciones generales  · Controle kerrie mareos para opal si hay cambios.  · [place] los medicamentos de venta lexa y los recetados solamente kinga se lo haya indicado el médico. Hable con el médico si sharon que los medicamentos que está tomando son la causa de kerrie mareos.  · Infórmele a un amigo o a un familiar si se siente mareado. Pídale a esta persona que llame al médico si observa cambios en rice comportamiento.  · Concurra a todas las visitas de seguimiento kinga se lo haya indicado el médico. [place] es importante.  Comuníquese con un médico si:  · Los mareos persisten.  · Los mareos o la sensación de desvanecimiento empeoran.  · Siente náuseas.  · Se le redujo la audición.  · Aparecen nuevos síntomas.  · Cuando está de pie, se siente inestable o que la habitación da vueltas.  Solicite ayuda de inmediato si:  · Vomita o tiene diarrea y no puede comer ni beber nada.  · Tiene dificultad para hablar, caminar, tragar o usar los brazos, las domingo o las piernas.  · Se siente usualmente débil.  · No piensa con claridad o tiene dificultad para armar oraciones. Es posible que un amigo o un familiar adviertan que esto ocurre.  · Tiene dolor de pecho, dolor abdominal, sudoración o le falta el aire.  · Sufre cambios en la visión.  · Tiene cualquier tipo de sangrado.  · Tiene dolor de kyle intenso.  · Tiene dolor o rigidez en el reji.  · Tiene fiebre.  Estos síntomas pueden representar un problema grave que constituye jasiel emergencia. No espere hasta que los síntomas desaparezcan. Solicite atención médica de inmediato. Comuníquese con el servicio de emergencias de rice localidad (911 en los [place]). No conduzca por kerrie propios medios hasta el hospital.  Resumen  · Los mareos son jasiel sensación de inestabilidad o desvanecimiento. Esta afección puede tener muchas causas, entre las que se pueden mencionar los medicamentos, la  deshidratación y las enfermedades.  · Las personas de todas las edades pueden sufrir mareos, faviola es más frecuente en los adultos mayores.  · Keren suficiente líquido kinga para mantener la orina shayla o de color amarillo pálido. No keren alcohol.  · Evite los movimientos rápidos si se siente mareado. Controle kerrie mareos para opal si hay cambios.  Esta información no tiene kinga fin reemplazar el consejo del médico. Asegúrese de hacerle al médico cualquier pregunta que tenga.  Document Released: 12/18/2006 Document Revised: 04/06/2018 Document Reviewed: 04/06/2018  Elsevier Patient Education © 2020 [employer]    Insulin Detemir injection  ¿Qué es gregory medicamento?  La INSULINA DETEMIR es jasiel insulina de origen humano. Gregory medicamento disminuye la cantidad de azúcar en la johnathon. Es jasiel insulina de acción prolongada que normalmente se administra jasiel o dos veces al día.  Gregory medicamento puede ser utilizado para otros usos; si tiene alguna pregunta consulte con rice proveedor de atención médica o con rice farmacéutico.  MARCAS COMUNES: Levemir, Levemir FlexTouch  ¿Qué le christian informar a mi profesional de la isabel antes de lakia gregory medicamento?  Necesitan saber si usted presenta alguno de los siguientes problemas o situaciones:  episodios de bajo nivel de azúcar en la johnathon enfermedad ocular, problemas de la visión enfermedad renal enfermedad hepática jasiel reacción alérgica o inusual a la insulina, al metacresol, a otros medicamentos, alimentos, colorantes o conservantes si está embarazada o buscando quedar embarazada si está amamantando a un bebé  ¿Cómo christian utilizar gregory medicamento?  Gregory medicamento se administra mediante jasiel inyección por vía subcutánea. [place] gregory medicamento a la(s) misma(s) hora(s) cada día. Úselo exactamente kinga se le indique. Esta insulina nunca debe mezclarse en la misma jeringa con otras insulinas antes de la inyección. No agite vigorosamente la insulina antes de usarla. Le enseñarán a ajustar  la dosis para actividades y enfermedades. No use más insulina de la recetada. No use con mayor o derrick frecuencia de la recetada.  Siempre revise la apariencia de la insulina antes de usarla. Gregory medicamento debe ser transparente y sin color, kinga el agua. No lo use si está turbio, espeso, con color o si tiene partículas sólidas. Si usa un inyector, asegúrese de retirar la cubierta exterior de la aguja antes de usar la dosis. Es importante que deseche las agujas y las jeringas usadas en un recipiente resistente a los pinchazos. No las deseche en la basura. Si no tiene un recipiente resistente a los pinchazos, llame a rice farmacéutico o proveedor de atención de la isabel para obtenerlo.  Hable con rice pediatra para informarse acerca del uso de gregory medicamento en niños. Aunque gregory medicamento se puede recetar a niños tan pequeños kinga de 2 años de edad para ciertas afecciones, existen precauciones que deben tomarse.  Sobredosis: Póngase en contacto inmediatamente con un centro toxicológico o jasiel jami de urgencia si usted sharon que haya tomado demasiado medicamento.  ATENCIÓN: Gregory medicamento es solo para usted. No comparta gregory medicamento con nadie.  ¿Qué sucede si me olvido de jasiel dosis?  Es importante no olvidar jasiel dosis. Rice profesional de la isabel o rice médico debe hablar con usted acerca de algún plan para dosis olvidadas. Si olvida jasiel dosis, siga rice plan. No use dosis dobles.  ¿Qué puede interactuar con gregory medicamento?  otros medicamentos para la diabetes  Muchos medicamentos pueden causar cambios en el nivel de azúcar en la johnathon. Estos incluyen:  bebidas con alcohol medicamentos antivirales para el VIH o SIDA aspirina y medicamentos tipo aspirina ciertos medicamentos para la presión sanguínea, enfermedad cardiaca y ritmo cardiaco irregular cromo diuréticos hormonas femeninas, tales kinga estrógenos o progestinas, píldoras anticonceptivas fenofibrato gemfibrozil isoniazida lanreotida hormonas masculinas o  esteroides anabólicos IMAO, tales kinga Carbex, Eldepryl, Marplan, [place] y [place] medicamentos para bajar de peso medicamentos para alergias, asma, resfríos o tos medicamentos para la depresión, ansiedad o trastornos psicóticos niacina nicotina ALMA ROSA, medicamentos para el dolor y la inflamación, kinga ibuprofeno o naproxeno octreotida pasireotida pentamidina fenitoína probenecida antibióticos del thom de las quinolonas, tales kinga ciprofloxacino, levofloxacino y ofloxacino algunos suplementos dietéticos a base de hierbas medicamentos esteroideos, kinga la prednisona o la cortisona sulfametoxasol; trimetoprima hormonas tiroideas  Algunos medicamentos pueden ocultar los síntomas de advertencia de niveles bajos de azúcar en la johnathon (hipoglucemia). Es posible que deba monitorear más atentamente rice nivel de azúcar en la johnathon si está tomando jeri de estos medicamentos. Estos medicamentos incluyen:  betabloqueadores, que con frecuencia se usan para la presión sanguínea nicolle o problemas cardiacos (algunos ejemplos son atenolol, metoprolol y propranolol) clonidina guanetidina reserpina  Puede ser que esta lista no menciona todas las posibles interacciones. Informe a rice profesional de la isabel de todos los productos a base de hierbas, medicamentos de venta lexa o suplementos nutritivos que esté tomando. Si usted fuma, consume bebidas alcohólicas o si utiliza drogas ilegales, indíqueselo también a rice profesional de la isabel. Algunas sustancias pueden interactuar con rice medicamento.  ¿A qué christian estar atento al usar gregory medicamento?  Visite a rice profesional de la isabel o a rice médico para que revise regularmente rice evolución.  Se monitorizará jasiel prueba llamada Hemoglobina A1C (A1C). Es un análisis de johnathon sencillo. Mide rice control del nivel de azúcar en la johnathon lisa los últimos 2 a 3 meses. Se le realizará esta prueba cada 3 a 6 meses.  Aprenda a revisar rice nivel de azúcar en la johnathon. Conozca los síntomas del nivel  bajo y elevado de azúcar en la johnathon y cómo controlarlos.  Siempre lleve con usted jasiel heath rápida de azúcar por si tiene síntomas de nivel bajo de azúcar en la johnathon. Algunos ejemplos incluyen caramelos duros de azúcar o tabletas de glucosa. Asegúrese de que otras personas sepan que usted se puede ahogar si come o smita cuando presenta síntomas graves de nivel bajo de azúcar en la johnathon, kinga convulsiones o pérdida del conocimiento. Deben obtener ayuda médica de inmediato.  Informe a rice médico o profesional de la isabel si tiene niveles elevados de azúcar en la johnathon. Es posible que deba cambiar la dosis de rice medicamento. Si está enfermo o hace más ejercicio que lo habitual, es posible que necesite cambiar la dosis de rice medicamento.  No saltee comidas. Pregunte a rice médico o profesional de la isabel si debe evitar el alcohol. Muchos productos de venta lexa para la tos y el resfrío contienen azúcar o alcohol. Estos pueden afectar los niveles de azúcar en la johnathon.  Asegúrese de tener el tipo correcto de jeringa para el tipo de insulina que utiliza. Intente no cambiar de domi y tipo de insulina o jeringa, a menos que se lo indique rice profesional de la isabel o médico. Cambiar de domi o tipo de insulina puede causar niveles peligrosamente elevados o bajos de azúcar en la johnathon. Siempre tenga a mano un suministro extra de insulina, jeringas y agujas. Use la jeringa solamente jasiel vez. Deseche la jeringa y la aguja en un recipiente cerrado para evitar que alguien se pinche accidentalmente.  Los inyectores y cartuchos de insulina nunca deben compartirse. Incluso si se cambia la aguja, al compartir se pueden contagiar virus kinga la hepatitis o el VIH.  Cada vez que reciba jasiel nueva caja de agujas de inyector, verifique que tori del mismo tipo de las que está entrenado para usar. Si no es así, pídale a rice profesional de la isabel que le muestre cómo usar correctamente gregory nuevo tipo de agujas.  Use un brazalete o  jasiel bell de identificación médica, y lleve con usted jasiel tarjeta que describa rice enfermedad, detalles de rice medicamento y horario de dosis.  ¿Qué efectos secundarios puedo tener al utilizar gregory medicamento?  Efectos secundarios que debe informar a rice médico o a rice profesional de la isabel tan pronto kinga sea posible:  reacciones alérgicas, kinga erupción cutánea, picazón o urticarias, e hinchazón de la annelise, los labios o la lengua problemas respiratorios signos y síntomas de niveles elevados de azúcar en la johnathon, tales kinga mareo, boca seca, piel seca, aliento frutal, náuseas, dolor de estómago, aumento del apetito o la sed, aumento de la frecuencia urinaria signos y síntomas de niveles bajos de azúcar en la johnathon, tales kinga ansiedad, confusión, mareos, aumento del apetito, debilidad o cansancio inusuales, sudoración, temblores, frío, irritabilidad, dolor de kyle, visión borrosa, ritmo cardiaco rápido y pérdida del conocimiento  Efectos secundarios que generalmente no requieren atención médica (infórmelos a rice médico o a rice profesional de la isabel si persisten o si son molestos):  aumento o disminución del tejido adiposo debajo de la piel debido al uso excesivo de un lugar de inyección en particular picazón, ardor, hinchazón o erupción en el lugar de la inyección  Puede ser que esta lista no menciona todos los posibles efectos secundarios. Comuníquese a rice médico por asesoramiento médico sobre los efectos secundarios. Usted puede informar los efectos secundarios a la FDA por teléfono al 1-800-FDA-1088.  ¿Dónde christian guardar mi medicina?  [place]anténgala fuera del alcance de los niños.  Guarde los Flextouch Pens en el refrigerador a jasiel temperatura de entre 2 y 8 grados C (36 y 46 grados F). No la congele o utilice si la insulina ha estado congelada. Jasiel vez abierta las plumas deben ser mantenidas a jasiel temperatura ambiente, menos de 30 grados C (86 grados F). Jasiel vez abierta, no guarde en el refrigerador. Jaseil vez  abierta, la insulina se puede utilizar por 42 días. Después de 42 días, deseche el Flextouch Pen.  Guarde los frascos de insulina sin abrir en el refrigerador a jasiel temperatura de entre 2 y 8 grados C (36 y 46 grados F). No la congele o utilice si la insulina ha estado congelada. Los frascos abiertos (frascos que esté utilizando actualmente) pueden guardarse en el refrigerador, nunca en un congelador. Si refrigeración no es posible, el frasco abierto se puede guardar a temperatura ambiente sin refrigeración, menos de 30 grados C (86 grados F) hasta 42 días. Después de 42 josh, deseche el frasco de insulina. El mantener rice insulina a temperatura ambiente disminuye la cantidad de dolor que experimenta lisa la inyección.  Proteger tania y calor excesivo. Deseche todo el medicamento que no haya utilizado, después de la fecha de vencimiento o después que haya pasado el tiempo especificado para guardar a temperatura ambiente.  ATENCIÓN: Gregory folleto es un resumen. Puede ser que no cubra toda la posible información. Si usted tiene preguntas acerca de esta medicina, consulte con rice médico, rice farmacéutico o rice profesional de la isabel.  © 2020 Elsevier/Gold Standard (2019-10-03 00:00:00)    Metformin; Pioglitazone tablets  ¿Qué es gregory medicamento?  La combinación METFORMINA; PIOGLITAZONA ayuda a tratar la diabetes tipo 2. Gregory medicamento ayuda a controlar el nivel de azúcar en la johnathon. El tratamiento se combina con ejercicios y jasiel dieta.  Gregory medicamento puede ser utilizado para otros usos; si tiene alguna pregunta consulte con rice proveedor de atención médica o con rice farmacéutico.  MARCAS COMUNES: Actoplus Met  ¿Qué le christian informar a mi profesional de la isabel antes de lakia gregory medicamento?  Necesitan saber si usted presenta alguno de los siguientes problemas o situaciones:  anemia cáncer de vejiga deshidratación enfermedad ocular, problemas de la visión enfermedad cardiaca insuficiencia cardiaca si smita  alcohol con frecuencia enfermedad renal enfermedad hepática síndrome de ovario poliquístico infección o lesión grave vómito jasiel reacción alérgica o inusual a la metformina, a la pioglitazona, a otros medicamentos, alimentos, colorantes o conservantes si está embarazada o buscando quedar embarazada si está amamantando a un bebé  ¿Cómo christian utilizar gregory medicamento?  [place] gregory medicamento por vía oral con un vaso de agua. Siga las instrucciones de la etiqueta del medicamento. [place] gregory medicamento con alimentos. [place] kerrie dosis a intervalos regulares. No tome rice medicamento con jasiel frecuencia mayor a la indicada. No deje de tomarlo excepto si así lo indica rice médico.  Rice farmacéutico le dará jasiel Guía del medicamento especial con cada receta y relleno. Asegúrese de leer esta información cada vez cuidadosamente.  Hable con rice pediatra para informarse acerca del uso de gregory medicamento en niños. Puede requerir atención especial.  Sobredosis: Póngase en contacto inmediatamente con un centro toxicológico o jasiel jami de urgencia si usted sharon que haya tomado demasiado medicamento.  ATENCIÓN: Gregory medicamento es solo para usted. No comparta gregory medicamento con nadie.  ¿Qué sucede si me olvido de jasiel dosis?  Si olvida jasiel dosis, tómela lo antes posible. Si es piotr la hora de la próxima dosis, tome sólo dorie dosis. No tome dosis adicionales o dobles.  ¿Qué puede interactuar con gregory medicamento?  No tome gregory medicamento con ninguno de los siguientes fármacos:  ciertos medicamentos de contraste que se administran antes de jasiel radiografía, tomografía computarizada, IRM (MRI) u otros procedimientos dofetilida  Gregory medicamento también puede interactuar con los siguientes fármacos:  acetazolamida alcohol atorvastatina ciertos medicamentos antivirales para VIH o hepatitis ciertos medicamentos para la presión sanguínea, enfermedad cardiaca y ritmo cardiaco irregular cimetidina diclorfenamida digoxina diuréticos hormonas femeninas,  kinga estrógenos o progestinas, y píldoras anticonceptivas gemfibrozil glicopirrolato insulina isoniazida ketoconazol lamotrigina memantina metazolamida metoclopramida midazolám midodrina niacina fenotiazinas, tales kinga clorpromazina, mesoridazina, proclorperazina, tioridazina fenitoína ranolazina rifampicina medicamentos esteroideos, kinga la prednisona o la cortisona medicamentos estimulantes para trastornos de atención, bajar de peso o mantenerse despierto medicamentos para la tiroides topiramato trospio vandetanib zonisamida  Puede ser que esta lista no menciona todas las posibles interacciones. Informe a rice profesional de la isabel de todos los productos a base de hierbas, medicamentos de venta lexa o suplementos nutritivos que esté tomando. Si usted fuma, consume bebidas alcohólicas o si utiliza drogas ilegales, indíqueselo también a rice profesional de la isabel. Algunas sustancias pueden interactuar con rice medicamento.  ¿A qué christian estar atento al usar gregory medicamento?  Visite a rice médico o a rice profesional de la isabel para que revise regularmente rice evolución.  Se monitorizará jaisel prueba llamada Hemoglobina A1C (A1C). Es un análisis de johnathon sencillo. Mide rice control del nivel de azúcar en la johnathon lisa los últimos 2 a 3 meses. Se le realizará esta prueba cada 3 a 6 meses.  Aprenda a revisar rice nivel de azúcar en la johnathon. Conozca los síntomas del nivel bajo y elevado de azúcar en la johnathon y cómo controlarlos.  Siempre lleve con usted jasiel heath rápida de azúcar por si tiene síntomas de nivel bajo de azúcar en la johnathon. Algunos ejemplos incluyen caramelos duros de azúcar o tabletas de glucosa. Asegúrese de que otras personas sepan que usted se puede ahogar si come o smita cuando presenta síntomas graves de nivel bajo de azúcar en la johnathon, kinga convulsiones o pérdida del conocimiento. Deben obtener ayuda médica de inmediato.  Informe a rice médico o profesional de la isabel si tiene niveles elevados de  azúcar en la johnathon. Es posible que deba cambiar la dosis de rice medicamento. Si está enfermo o hace más ejercicio que lo habitual, es posible que necesite cambiar la dosis de rice medicamento.  No saltee comidas. Pregunte a rice médico o profesional de la isabel si debe evitar el alcohol. Muchos productos de venta lexa para la tos y el resfrío contienen azúcar o alcohol. Estos pueden afectar los niveles de azúcar en la johnathon.  Si necesita realizarse jasiel cirugía o si necesitará un procedimiento con fármacos de contraste, comuníquele a rice médico o a rice profesional de la isabel que está tomando gregory medicamento.  Use un brazalete o jasiel bell de identificación médica, y lleve con usted jasiel tarjeta que describa rice enfermedad, detalles de rice medicamento y horario de dosis.  Gregory medicamento puede causar jasiel disminución del ácido fólico y de la vitamina B12. Debe asegurarse de recibir suficientes vitaminas mientras amina gregory medicamento. [place] con rice profesional de la isabel sobre los alimentos que come y las vitaminas que amina.  ¿Qué efectos secundarios puedo tener al utilizar gregory medicamento?  Efectos secundarios que debe informar a rice médico o a riec profesional de la isabel tan pronto kinga sea posible:  reacciones alérgicas, kinga erupción cutánea, comezón/picazón o urticaria, e hinchazón de la annelise, los labios o la lengua johnathon en la orina problemas respiratorios sensación de desmayo o aturdimiento, caídas aumento de la frecuencia urinaria sylvia musculares dolor al orinar signos y síntomas de niveles bajos de azúcar en la johnathon, tales kinga ansiedad, confusión, mareos, aumento del apetito, debilidad o cansancio inusuales, sudoración, temblores, frío, irritabilidad, dolor de kyle, visión borrosa, ritmo cardiaco rápido, pérdida del conocimiento ritmo cardiaco lento o irregular dolor o malestar estomacal inusual cansancio o debilidad inusual  Efectos secundarios que generalmente no requieren atención médica (infórmelos  a rice médico o a rice profesional de la isabel si persisten o si son molestos):  diarrea gases dolor de kyle acidez estomacal sabor metálico en la boca náuseas dolor de garganta goteo de la nariz o nariz congestionada malestar estomacal  Puede ser que esta lista no menciona todos los posibles efectos secundarios. Comuníquese a rice médico por asesoramiento médico sobre los efectos secundarios. Usted puede informar los efectos secundarios a la FDA por teléfono al 1-800-FDA-1088.  ¿Dónde christian guardar mi medicina?  [place]ant[place]ala fuera del alcance de los niños.  Guárdela a temperatura ambiente, entre 15 y 30 grados C (59 y 86 grados F). Protéjala tania y de la humedad. Deseche todo el medicamento que no haya utilizado, después de la fecha de vencimiento.  ATENCIÓN: [place] folleto es un resumen. Puede ser que no cubra toda la posible información. Si usted tiene preguntas acerca de esta medicina, consulte con rice médico, rice farmacéutico o rice profesional de la isabel.  © 2020 Elsevier/Gold Standard (2019-03-21 00:00:00)      Discharge Instructions    Discharged to home by car with relative. Discharged via wheelchair, hospital escort: Yes.  Special equipment needed: Not Applicable    Be sure to schedule a follow-up appointment with your primary care doctor or any specialists as instructed.     Discharge Plan:   Diet Plan: Discussed  Activity Level: Discussed  Confirmed Follow up Appointment: Patient to Call and Schedule Appointment  Medication Reconciliation Updated: Yes    I understand that a diet low in cholesterol, fat, and sodium is recommended for good health. Unless I have been given specific instructions below for another diet, I accept this instruction as my diet prescription.   Other diet: diabetic    Special Instructions: None    · Is patient discharged on Warfarin / Coumadin?   No     Depression / Suicide Risk    As you are discharged from this [facility] facility, it is important to learn how to keep safe from  harming yourself.    Recognize the warning signs:  · Abrupt changes in personality, positive or negative- including increase in energy   · Giving away possessions  · Change in eating patterns- significant weight changes-  positive or negative  · Change in sleeping patterns- unable to sleep or sleeping all the time   · Unwillingness or inability to communicate  · Depression  · Unusual sadness, discouragement and loneliness  · Talk of wanting to die  · Neglect of personal appearance   · Rebelliousness- reckless behavior  · Withdrawal from people/activities they love  · Confusion- inability to concentrate     If you or a loved one observes any of these behaviors or has concerns about self-harm, here's what you can do:  · Talk about it- your feelings and reasons for harming yourself  · Remove any means that you might use to hurt yourself (examples: pills, rope, extension cords, firearm)  · Get professional help from the community (Mental Health, Substance Abuse, psychological counseling)  · Do not be alone:Call your Safe Contact- someone whom you trust who will be there for you.  · Call your local CRISIS HOTLINE 504-6008 or 259-285-6846  · Call your local Children's Mobile Crisis Response Team [place] (334) 953-6925 or www.[employer]  · Call the toll free National Suicide Prevention Hotlines   · National Suicide Prevention Lifeline 563-689-IBWT (1363)  · National Hope Line Network 800-SUICIDE (776-0458)

## 2020-08-31 NOTE — PROGRESS NOTES
Report received from Sara. Plan of care discussed. Patient resting comfortably in bed, declines any further needs at this time. Daughter at bedside.  Safety precautions in place.

## 2020-08-31 NOTE — PROGRESS NOTES
Telemetry Shift Summary    Rhythm SR  HR Range 82-88  Ectopy none  Measurements 0.12/0.08/0.38    Per Yina, MT        Normal Values  Rhythm SR  HR Range    Measurements 0.12-0.20 / 0.06-0.10  / 0.30-0.52

## 2020-08-31 NOTE — PROGRESS NOTES
2 RN skin check complete with Kayla   Devices in place: none  Only area noted for concern is pannus folds, L >R. Per patient has appt this week with PCP.  The following interventions in place: education provided on risk for skin breakdown. Pt able to reposition self independently in bed.

## 2020-08-31 NOTE — CARE PLAN
Problem: Communication  Goal: The ability to communicate needs accurately and effectively will improve  Outcome: PROGRESSING AS EXPECTED  Intervention: [place] patient and significant other/support system to call light to alert staff of needs  Flowsheets (Taken 8/30/2020 2234)  Oriented to:: All of the Following : Location of Bathroom, Visiting Policy, Unit Routine, Call Light and Bedside Controls, Bedside Rail Policy, Smoking Policy, Rights and Responsibilities, Bedside Report, and Patient Education Notebook  Note: Ipad  used for plan of care discussions.      Problem: Infection  Goal: Will remain free from infection  Outcome: PROGRESSING AS EXPECTED  Note: Pt educated on hand and oral hygiene and their roll in infection prevention.  Standard precautions used in pt care.

## 2020-08-31 NOTE — PROGRESS NOTES
Telemetry Shift Summary    Rhythm SR/ST  HR Range   Ectopy none  Measurements 0.12/0.10/0.36    Per Yina MT    Normal Values  Rhythm SR  HR Range    Measurements 0.12-0.20 / 0.06-0.10  / 0.30-0.52

## 2020-08-31 NOTE — PROGRESS NOTES
Pt discharged to home. Discharge instructions provided to pt. Pt verbalizes understanding. Pt states all questions have been answered. Signed copy in chart. Prescriptions sent to preferred pharmacy. Pt states that all personal belongings are in possession. Pt off unit via wheelchair, escorted by nursing staff.  used to d/c instructions. Daughter updated via phone.

## 2020-08-31 NOTE — DISCHARGE SUMMARY
Discharge Summary    CHIEF COMPLAINT ON ADMISSION  Chief Complaint   Patient presents with   • N/V   • Dizziness       Reason for Admission  High Blood Sugar      Admission Date  8/30/2020    CODE STATUS  Full Code    HPI & HOSPITAL COURSE  This is a 65 y.o. female here with dizziness and elevated blood sugar.  She had onset of symptoms at 4am with increased dizziness when she would look to the left, memory difficulty and per family was speaking slower than normal so she presented to the ED for further evaluation.  She had a blood sugar in the 400s and was started on aggressive IV hydration and sliding scale insulin with improvement in both her symptoms and sugars.  MRI brain was done to r/o stroke and no evidence of acute pathology found.  She did have a change in the corpus collosum consistent with an old CVA so will be started on ASA 81mg daily.  She has only been on the Levemir 10 units and hadn't started taking metformin as prescribed by her PCP whom she had established with earlier this month.  Levemir dose will be increased to 15 units (lantus 15U given here in the hospital) and she will start metformin 1000mg bid as recommended by Dr Christensen.   was used to convey all of this information to the patient and she states she understands.          Therefore, she is discharged in good and stable condition to home with close outpatient follow-up.      Discharge Date  8/31/2020    FOLLOW UP ITEMS POST DISCHARGE  PCP - Dr Christensen     DISCHARGE DIAGNOSES  Principal Problem:    Postural dizziness with presyncope POA: Yes  Active Problems:    Type II diabetes mellitus, uncontrolled (HCC) POA: Yes    GERD (gastroesophageal reflux disease) POA: Yes    Hyperglycemia POA: Unknown    Lactic acidosis POA: Unknown    Nausea and vomiting POA: Unknown  Resolved Problems:    * No resolved hospital problems. *      FOLLOW UP  Future Appointments   Date Time Provider Department Center   9/15/2020  9:20 AM MARGARITA Armando  GLADYS Christensen M.D.  [address]  [address]  [facility] [id number] [phone]    In 2 weeks        MEDICATIONS ON DISCHARGE     Medication List      START taking these medications      Instructions   Levemir FlexTouch 100 UNIT/ML injection PEN  Generic drug: insulin detemir  Replaces: insulin detemir 100 UNIT/ML Soln   Inject 15 Units as instructed every morning.  Dose: 15 Units        CHANGE how you take these medications      Instructions   metformin 1000 MG tablet  What changed: medication strength  Commonly known as: GLUCOPHAGE   Take 1 Tab by mouth 2 times a day, with meals for 30 days.  Dose: 1,000 mg        CONTINUE taking these medications      Instructions   aspirin 81 MG Chew chewable tablet  Commonly known as: ASA   Take 1 Tab by mouth every day.  Dose: 81 mg     ibuprofen 200 MG Tabs  Commonly known as: MOTRIN   Take 400 mg by mouth every 6 hours as needed for Mild Pain.  Dose: 400 mg     miconazole 2 % Crea  Commonly known as: MICOTIN   Apply 1 Application to affected area(s) as needed (For rash). Apply's on feet  Dose: 1 Application        STOP taking these medications    amoxicillin-clavulanate 875-125 MG Tabs  Commonly known as: AUGMENTIN     insulin detemir 100 UNIT/ML Soln  Commonly known as: Levemir  Replaced by: Levemir FlexTouch 100 UNIT/ML injection PEN            Allergies  No Known Allergies    DIET  Orders Placed This Encounter   Procedures   • Diet Order Diabetic     Standing Status:   Standing     Number of Occurrences:   1     Order Specific Question:   Diet:     Answer:   Diabetic [3]       ACTIVITY  As tolerated.  Weight bearing as tolerated    CONSULTATIONS  none    PROCEDURES  none    LABORATORY  Lab Results   Component Value Date    SODIUM 139 08/30/2020    POTASSIUM 3.5 (L) 08/30/2020    CHLORIDE 99 08/30/2020    CO2 24 08/30/2020    GLUCOSE 419 (H) 08/30/2020    BUN 18 08/30/2020    CREATININE 0.64 08/30/2020        Lab Results   Component Value Date    WBC 11.5  (H) 08/30/2020    HEMOGLOBIN 14.8 08/30/2020    HEMATOCRIT 43.9 08/30/2020    PLATELETCT 243 08/30/2020        Total time of the discharge process exceeds 32 minutes.

## 2020-08-31 NOTE — PROGRESS NOTES
Telemetry Shift Summary    Rhythm SR  HR Range 60s-80s  Ectopy none  Measurements 0.16/0.08/0.40        Normal Values  Rhythm SR  HR Range    Measurements 0.12-0.20 / 0.06-0.10  / 0.30-0.52

## 2020-09-02 ENCOUNTER — TELEPHONE (OUTPATIENT)
Dept: MEDICAL GROUP | Facility: MEDICAL CENTER | Age: 65
End: 2020-09-02

## 2020-09-02 DIAGNOSIS — E87.6 HYPOKALEMIA: ICD-10-CM

## 2020-09-02 DIAGNOSIS — R74.8 ELEVATED ALKALINE PHOSPHATASE LEVEL: ICD-10-CM

## 2020-09-02 NOTE — TELEPHONE ENCOUNTER
Contacted patient daughter states they were instructed at her [facility] hospital visit that to discontinue taking the insulin. Daughter is concerned if patient should discontinue right away or if they should wait till after the patients appointment on September 15th       PLEASE ADVISE

## 2020-09-02 NOTE — TELEPHONE ENCOUNTER
Hospital did not instruct to discontinue insulin.  Instead, the dose was increased to 15 units to control her blood sugar.  As below, discharge medication list might be confusing because they have instructed to stop Levemir old dose below, but asked to take 15 units at the beginning.  I agree with what this list from discharge summary.  No urgent concerning blood tests. She can do labs I have asked from 8/14/20 and I've ordered more today to recheck potassium for low potassium, all non fasting. She needs to have fingerstick glucose checked before breakfast every day and bring to appointment. More details need to be discussed during appointment.     MEDICATIONS ON DISCHARGE          Medication List           START taking these medications      Instructions   Levemir FlexTouch 100 UNIT/ML injection PEN  Generic drug: insulin detemir  Replaces: insulin detemir 100 UNIT/ML Soln    Inject 15 Units as instructed every morning.  Dose: 15 Units                  CHANGE how you take these medications      Instructions   metformin 1000 MG tablet  What changed: medication strength  Commonly known as: GLUCOPHAGE    Take 1 Tab by mouth 2 times a day, with meals for 30 days.  Dose: 1,000 mg                  CONTINUE taking these medications      Instructions   aspirin 81 MG Chew chewable tablet  Commonly known as: ASA    Take 1 Tab by mouth every day.  Dose: 81 mg      ibuprofen 200 MG Tabs  Commonly known as: MOTRIN    Take 400 mg by mouth every 6 hours as needed for Mild Pain.  Dose: 400 mg      miconazole 2 % Crea  Commonly known as: MICOTIN    Apply 1 Application to affected area(s) as needed (For rash). Apply's on feet  Dose: 1 Application          STOP taking these medications    amoxicillin-clavulanate 875-125 MG Tabs  Commonly known as: AUGMENTIN      insulin detemir 100 UNIT/ML Soln  Commonly known as: Levemir  Replaced by: Levemir FlexTouch 100 UNIT/ML injection PEN

## 2020-09-02 NOTE — TELEPHONE ENCOUNTER
1. Caller Name: Stephanie                   Call Back Number: ph.     Pt laura states that Pt needs an analysis for her blood draw as well as has some questions about medications from her recent hospital visit. She also wants to be able to access her mothers [facility].

## 2020-09-04 LAB
BACTERIA BLD CULT: NORMAL
BACTERIA BLD CULT: NORMAL
SIGNIFICANT IND 70042: NORMAL
SIGNIFICANT IND 70042: NORMAL
SITE SITE: NORMAL
SITE SITE: NORMAL
SOURCE SOURCE: NORMAL
SOURCE SOURCE: NORMAL

## 2020-09-11 ENCOUNTER — HOSPITAL ENCOUNTER (OUTPATIENT)
Dept: LAB | Facility: MEDICAL CENTER | Age: 65
End: 2020-09-11
Attending: INTERNAL MEDICINE
Payer: COMMERCIAL

## 2020-09-11 DIAGNOSIS — M79.10 MYALGIA: ICD-10-CM

## 2020-09-11 DIAGNOSIS — E87.6 HYPOKALEMIA: ICD-10-CM

## 2020-09-11 DIAGNOSIS — R74.8 ELEVATED ALKALINE PHOSPHATASE LEVEL: ICD-10-CM

## 2020-09-11 LAB
25(OH)D3 SERPL-MCNC: 12 NG/ML (ref 30–100)
ALBUMIN SERPL BCP-MCNC: 4.3 G/DL (ref 3.2–4.9)
ALBUMIN/GLOB SERPL: 1.3 G/DL
ALP SERPL-CCNC: 88 U/L (ref 30–99)
ALT SERPL-CCNC: 10 U/L (ref 2–50)
ANION GAP SERPL CALC-SCNC: 12 MMOL/L (ref 7–16)
AST SERPL-CCNC: 15 U/L (ref 12–45)
BILIRUB SERPL-MCNC: 0.3 MG/DL (ref 0.1–1.5)
BUN SERPL-MCNC: 14 MG/DL (ref 8–22)
CALCIUM SERPL-MCNC: 9.8 MG/DL (ref 8.4–10.2)
CHLORIDE SERPL-SCNC: 100 MMOL/L (ref 96–112)
CK SERPL-CCNC: 39 U/L (ref 0–154)
CO2 SERPL-SCNC: 27 MMOL/L (ref 20–33)
CREAT SERPL-MCNC: 0.51 MG/DL (ref 0.5–1.4)
CRP SERPL HS-MCNC: 0.38 MG/DL (ref 0–0.75)
GGT SERPL-CCNC: 14 U/L (ref 7–34)
GLOBULIN SER CALC-MCNC: 3.2 G/DL (ref 1.9–3.5)
GLUCOSE SERPL-MCNC: 184 MG/DL (ref 65–99)
POTASSIUM SERPL-SCNC: 4.4 MMOL/L (ref 3.6–5.5)
PROT SERPL-MCNC: 7.5 G/DL (ref 6–8.2)
SODIUM SERPL-SCNC: 139 MMOL/L (ref 135–145)

## 2020-09-11 PROCEDURE — 82977 ASSAY OF GGT: CPT

## 2020-09-11 PROCEDURE — 82550 ASSAY OF CK (CPK): CPT

## 2020-09-11 PROCEDURE — 36415 COLL VENOUS BLD VENIPUNCTURE: CPT

## 2020-09-11 PROCEDURE — 82306 VITAMIN D 25 HYDROXY: CPT

## 2020-09-11 PROCEDURE — 80053 COMPREHEN METABOLIC PANEL: CPT

## 2020-09-11 PROCEDURE — 86140 C-REACTIVE PROTEIN: CPT

## 2020-09-14 ENCOUNTER — HOSPITAL ENCOUNTER (OUTPATIENT)
Dept: LAB | Facility: MEDICAL CENTER | Age: 65
End: 2020-09-14
Attending: INTERNAL MEDICINE
Payer: COMMERCIAL

## 2020-09-14 DIAGNOSIS — M79.10 MYALGIA: ICD-10-CM

## 2020-09-14 LAB — ERYTHROCYTE [SEDIMENTATION RATE] IN BLOOD BY WESTERGREN METHOD: 20 MM/HOUR (ref 0–30)

## 2020-09-14 PROCEDURE — 85652 RBC SED RATE AUTOMATED: CPT

## 2020-09-14 PROCEDURE — 36415 COLL VENOUS BLD VENIPUNCTURE: CPT

## 2020-09-15 ENCOUNTER — OFFICE VISIT (OUTPATIENT)
Dept: MEDICAL GROUP | Facility: MEDICAL CENTER | Age: 65
End: 2020-09-15
Payer: COMMERCIAL

## 2020-09-15 VITALS
RESPIRATION RATE: 20 BRPM | BODY MASS INDEX: 28.77 KG/M2 | TEMPERATURE: 96.6 F | HEART RATE: 92 BPM | SYSTOLIC BLOOD PRESSURE: 110 MMHG | OXYGEN SATURATION: 96 % | WEIGHT: 162.4 LBS | DIASTOLIC BLOOD PRESSURE: 60 MMHG

## 2020-09-15 DIAGNOSIS — R42 POSTURAL DIZZINESS WITH PRESYNCOPE: ICD-10-CM

## 2020-09-15 DIAGNOSIS — Z86.73 HISTORY OF ISCHEMIC STROKE: ICD-10-CM

## 2020-09-15 DIAGNOSIS — R55 POSTURAL DIZZINESS WITH PRESYNCOPE: ICD-10-CM

## 2020-09-15 DIAGNOSIS — E11.65 UNCONTROLLED TYPE 2 DIABETES MELLITUS WITH HYPERGLYCEMIA (HCC): ICD-10-CM

## 2020-09-15 DIAGNOSIS — M16.0 PRIMARY OSTEOARTHRITIS OF BOTH HIPS: ICD-10-CM

## 2020-09-15 PROBLEM — R19.00 PELVIC MASS: Status: RESOLVED | Noted: 2020-08-04 | Resolved: 2020-09-15

## 2020-09-15 PROBLEM — R79.89 LOW VITAMIN D LEVEL: Status: ACTIVE | Noted: 2020-09-15

## 2020-09-15 PROCEDURE — 99214 OFFICE O/P EST MOD 30 MIN: CPT | Performed by: INTERNAL MEDICINE

## 2020-09-15 RX ORDER — ASPIRIN 81 MG/1
81 TABLET, CHEWABLE ORAL DAILY
Qty: 90 TAB | Refills: 3 | Status: SHIPPED | OUTPATIENT
Start: 2020-09-15 | End: 2021-05-14 | Stop reason: SDUPTHER

## 2020-09-15 RX ORDER — ROSUVASTATIN CALCIUM 5 MG/1
5 TABLET, COATED ORAL EVERY EVENING
Qty: 90 TAB | Refills: 3 | Status: SHIPPED | OUTPATIENT
Start: 2020-09-15 | End: 2021-05-14 | Stop reason: SDUPTHER

## 2020-09-15 ASSESSMENT — FIBROSIS 4 INDEX: FIB4 SCORE: 1.25

## 2020-09-15 NOTE — PROGRESS NOTES
Established Patient    Domonique presents today with the following:    CC:  DM, dizziness    HPI:   65 y.o. female came in for above.    She was recently hospitalized for dizziness, found to have old stroke in corpus callosum and uncontrolled hyperglycemia.   She was discharged after controlling hyperglycemia.    Currently, she is taking levemir 15 units daily, she brought in fingerstick glucose, but checked 2 hr post meals only ranging from 110s in the morning after breakfast to 300s when she is not on a good diet.  She stopped drinking soda.    She cannot tolerate metformin very well due to stomach upset.  She complains of continued dizziness, orthostatic symptoms, which can also happen after eating.    She has not started taking aspirin on finding of old stroke on MRI.    She continues to feel weak in her legs.      ROS:   No fever, chills.    Patient Active Problem List    Diagnosis Date Noted   • History of ischemic stroke 09/15/2020   • Low vitamin D level 09/15/2020   • Hyperglycemia 08/30/2020   • Lactic acidosis 08/30/2020   • Nausea and vomiting 08/30/2020   • Idiopathic peripheral neuropathy 08/04/2020   • Oliguria 08/04/2020   • Onychomycosis 08/04/2020   • Intertrigo 08/04/2020   • Other hemorrhoids 08/04/2020   • Postural dizziness with presyncope 08/04/2020   • Fall from ground level 08/04/2020   • Other fatigue 08/04/2020   • Snoring 08/04/2020   • Hair loss 08/04/2020   • Unintentional weight loss 08/04/2020   • Chronic constipation 08/04/2020   • Chronic left hip pain 10/19/2015   • Fibroid, uterine 06/12/2015   • Hyperlipidemia with target LDL less than 70 06/12/2015   • GERD (gastroesophageal reflux disease) 04/16/2015   • Varicose veins 04/13/2015   • Type II diabetes mellitus, uncontrolled (HCC) 04/13/2015       Current Outpatient Medications   Medication Sig Dispense Refill   • aspirin (ASA) 81 MG Chew Tab chewable tablet Take 1 Tab by mouth every day. 90 Tab 3   • rosuvastatin (CRESTOR) 5  MG Tab Take 1 Tab by mouth every evening. 90 Tab 3   • Empagliflozin 25 MG Tab Take 25 mg by mouth every day. 90 Tab 1   • metformin (GLUCOPHAGE) 500 MG Tab Take 1 Tab by mouth 2 times a day, with meals for 90 days. 60 Tab 2   • Blood Glucose Test Strips Test strips order: Test strips for [employer] [place] Lite meter. Sig: use twice daily and prn ssx high or low sugar #100 RF x 3 (may exchange from brand strip compatible with her machine) 100 Each 3   • insulin detemir (LEVEMIR FLEXTOUCH) 100 UNIT/ML injection PEN Inject 15 Units as instructed every morning. 1 Each 1   • miconazole (MICOTIN) 2 % Cream Apply 1 Application to affected area(s) as needed (For rash). Apply's on feet     • ibuprofen (MOTRIN) 200 MG Tab Take 400 mg by mouth every 6 hours as needed for Mild Pain.       No current facility-administered medications for this visit.          /60 (BP Location: Right arm, Patient Position: Sitting, BP Cuff Size: Adult)   Pulse 92   Temp 35.9 °C (96.6 °F) (Temporal)   Resp 20   Wt 73.7 kg (162 lb 6.4 oz)   SpO2 96%   BMI 28.77 kg/m²     Physical Exam  General: Alert and oriented, No apparent distress.  Lungs: Clear to auscultation bilaterally without any wheezing, crepitations.  Cardiovascular: Regular rate and rhythm. No murmurs, rubs or gallops.  Abdomen: Bowel sound +, soft, non tender, no rebound or guarding, no palpable organomegaly  Extremities: No clubbing, cyanosis, edema.  Skin: No rash or suspicious skin lesions noted.  Neuro: A & O x 4. Normal speech and memory.         Assessment and Plan    1. Uncontrolled type 2 diabetes mellitus with hyperglycemia (HCC)  - Cutdown metformin to 500 mg twice daily to see if she will tolerate this.  - add Empagliflozin 25 MG daily.  Encourage hydration and genital hygiene.  -Recommend to check fasting glucose every day with postprandial every other day. If FBS < 90, call clinic.  - Comp Metabolic Panel; Future  - Lipid Profile; Future  - HEMOGLOBIN A1C;  Future  - repeat lab in 2 months.    2. Postural dizziness with presyncope  -Suspect autonomic dysfunction /autonomic neuropathy from uncontrolled diabetes.   -Control diabetes as above.    3. History of ischemic stroke  - aspirin (ASA) 81 MG Chew Tab chewable tablet; Take 1 Tab by mouth every day.  Dispense: 90 Tab; Refill: 3  - rosuvastatin (CRESTOR) 5 MG Tab; Take 1 Tab by mouth every evening.  Dispense: 90 Tab; Refill: 3    4. Primary osteoarthritis of both hips  She has not had any fall using the cane. Unfortunately, she is not a good candidate for hip replacement yet due to uncontrolled diabetes.  I offered referral to orthopedics for steroid injections, but she would like to hold off due to financial limitations.  She will try to see PT if financially feasible for her.  - REFERRAL TO PHYSICAL THERAPY Reason for Therapy: Eval/Treat/Report    Patient was seen for 30 minutes face to face of which > 50% of appointment time was spent on counseling and coordination of care regarding the above.      Return in about 2 months (around 11/15/2020).    Signed by: Mena Christensen M.D.

## 2020-09-22 ENCOUNTER — PATIENT MESSAGE (OUTPATIENT)
Dept: MEDICAL GROUP | Facility: MEDICAL CENTER | Age: 65
End: 2020-09-22

## 2020-09-22 DIAGNOSIS — E11.65 UNCONTROLLED TYPE 2 DIABETES MELLITUS WITH HYPERGLYCEMIA (HCC): ICD-10-CM

## 2020-09-22 RX ORDER — LANCETS 30 GAUGE
EACH MISCELLANEOUS
Qty: 100 EACH | Refills: 3 | Status: SHIPPED | OUTPATIENT
Start: 2020-09-22 | End: 2020-10-21 | Stop reason: SDUPTHER

## 2020-09-22 NOTE — TELEPHONE ENCOUNTER
From: Domonique Galeano  To: Mena Christensen M.D.  Sent: 9/22/2020 1:56 PM PDT  Subject: Prescription Question    hi I need a refill on the following please    Accu-chek guide test strips  rx:[id number]    microlet colored lancets  rx:[id number]

## 2020-10-12 ENCOUNTER — APPOINTMENT (OUTPATIENT)
Dept: PHYSICAL THERAPY | Facility: MEDICAL CENTER | Age: 65
End: 2020-10-12
Attending: INTERNAL MEDICINE
Payer: COMMERCIAL

## 2020-10-19 ENCOUNTER — APPOINTMENT (OUTPATIENT)
Dept: PHYSICAL THERAPY | Facility: MEDICAL CENTER | Age: 65
End: 2020-10-19
Payer: COMMERCIAL

## 2020-10-21 DIAGNOSIS — E11.65 UNCONTROLLED TYPE 2 DIABETES MELLITUS WITH HYPERGLYCEMIA (HCC): ICD-10-CM

## 2020-10-21 RX ORDER — BLOOD-GLUCOSE METER
1 EACH MISCELLANEOUS DAILY
Qty: 1 KIT | Refills: 3 | Status: SHIPPED | OUTPATIENT
Start: 2020-10-21 | End: 2021-05-14

## 2020-10-21 RX ORDER — LANCETS 30 GAUGE
EACH MISCELLANEOUS
Qty: 100 EACH | Refills: 3 | Status: SHIPPED | OUTPATIENT
Start: 2020-10-21 | End: 2021-05-14 | Stop reason: SDUPTHER

## 2020-10-21 NOTE — TELEPHONE ENCOUNTER
Pt needs accucheck meter    Received request via: Patient    Was the patient seen in the last year in this department? Yes    Does the patient have an active prescription (recently filled or refills available) for medication(s) requested? No

## 2020-10-26 ENCOUNTER — APPOINTMENT (OUTPATIENT)
Dept: PHYSICAL THERAPY | Facility: MEDICAL CENTER | Age: 65
End: 2020-10-26
Payer: COMMERCIAL

## 2020-11-18 ENCOUNTER — HOSPITAL ENCOUNTER (OUTPATIENT)
Dept: LAB | Facility: MEDICAL CENTER | Age: 65
End: 2020-11-18
Attending: INTERNAL MEDICINE
Payer: COMMERCIAL

## 2020-11-18 DIAGNOSIS — E11.65 UNCONTROLLED TYPE 2 DIABETES MELLITUS WITH HYPERGLYCEMIA (HCC): ICD-10-CM

## 2020-11-18 LAB
ALBUMIN SERPL BCP-MCNC: 4.2 G/DL (ref 3.2–4.9)
ALBUMIN/GLOB SERPL: 1.4 G/DL
ALP SERPL-CCNC: 100 U/L (ref 30–99)
ALT SERPL-CCNC: 10 U/L (ref 2–50)
ANION GAP SERPL CALC-SCNC: 9 MMOL/L (ref 7–16)
AST SERPL-CCNC: 12 U/L (ref 12–45)
BILIRUB SERPL-MCNC: 0.3 MG/DL (ref 0.1–1.5)
BUN SERPL-MCNC: 20 MG/DL (ref 8–22)
CALCIUM SERPL-MCNC: 9.3 MG/DL (ref 8.4–10.2)
CHLORIDE SERPL-SCNC: 101 MMOL/L (ref 96–112)
CHOLEST SERPL-MCNC: 206 MG/DL (ref 100–199)
CO2 SERPL-SCNC: 26 MMOL/L (ref 20–33)
CREAT SERPL-MCNC: 0.56 MG/DL (ref 0.5–1.4)
EST. AVERAGE GLUCOSE BLD GHB EST-MCNC: 226 MG/DL
FASTING STATUS PATIENT QL REPORTED: NORMAL
GLOBULIN SER CALC-MCNC: 3.1 G/DL (ref 1.9–3.5)
GLUCOSE SERPL-MCNC: 230 MG/DL (ref 65–99)
HBA1C MFR BLD: 9.5 % (ref 0–5.6)
HDLC SERPL-MCNC: 47 MG/DL
LDLC SERPL CALC-MCNC: 134 MG/DL
POTASSIUM SERPL-SCNC: 4.6 MMOL/L (ref 3.6–5.5)
PROT SERPL-MCNC: 7.3 G/DL (ref 6–8.2)
SODIUM SERPL-SCNC: 136 MMOL/L (ref 135–145)
TRIGL SERPL-MCNC: 124 MG/DL (ref 0–149)

## 2020-11-18 PROCEDURE — 80053 COMPREHEN METABOLIC PANEL: CPT

## 2020-11-18 PROCEDURE — 83036 HEMOGLOBIN GLYCOSYLATED A1C: CPT

## 2020-11-18 PROCEDURE — 36415 COLL VENOUS BLD VENIPUNCTURE: CPT

## 2020-11-18 PROCEDURE — 80061 LIPID PANEL: CPT

## 2020-11-20 ENCOUNTER — OFFICE VISIT (OUTPATIENT)
Dept: MEDICAL GROUP | Facility: MEDICAL CENTER | Age: 65
End: 2020-11-20
Payer: COMMERCIAL

## 2020-11-20 VITALS
DIASTOLIC BLOOD PRESSURE: 84 MMHG | SYSTOLIC BLOOD PRESSURE: 142 MMHG | WEIGHT: 168 LBS | TEMPERATURE: 97.4 F | BODY MASS INDEX: 31.72 KG/M2 | HEART RATE: 97 BPM | HEIGHT: 61 IN | OXYGEN SATURATION: 97 %

## 2020-11-20 DIAGNOSIS — E11.65 UNCONTROLLED TYPE 2 DIABETES MELLITUS WITH HYPERGLYCEMIA (HCC): ICD-10-CM

## 2020-11-20 DIAGNOSIS — Z12.31 ENCOUNTER FOR SCREENING MAMMOGRAM FOR MALIGNANT NEOPLASM OF BREAST: ICD-10-CM

## 2020-11-20 DIAGNOSIS — R55 POSTURAL DIZZINESS WITH PRESYNCOPE: ICD-10-CM

## 2020-11-20 DIAGNOSIS — Z78.0 POSTMENOPAUSE: ICD-10-CM

## 2020-11-20 DIAGNOSIS — L30.4 INTERTRIGO: ICD-10-CM

## 2020-11-20 DIAGNOSIS — Z11.59 NEED FOR HEPATITIS C SCREENING TEST: ICD-10-CM

## 2020-11-20 DIAGNOSIS — Z23 NEED FOR VACCINATION: ICD-10-CM

## 2020-11-20 DIAGNOSIS — R79.89 LOW VITAMIN D LEVEL: ICD-10-CM

## 2020-11-20 DIAGNOSIS — R42 POSTURAL DIZZINESS WITH PRESYNCOPE: ICD-10-CM

## 2020-11-20 PROBLEM — R11.2 NAUSEA AND VOMITING: Status: RESOLVED | Noted: 2020-08-30 | Resolved: 2020-11-20

## 2020-11-20 PROBLEM — R73.9 HYPERGLYCEMIA: Status: RESOLVED | Noted: 2020-08-30 | Resolved: 2020-11-20

## 2020-11-20 PROBLEM — R63.4 UNINTENTIONAL WEIGHT LOSS: Status: RESOLVED | Noted: 2020-08-04 | Resolved: 2020-11-20

## 2020-11-20 PROBLEM — E87.20 LACTIC ACIDOSIS: Status: RESOLVED | Noted: 2020-08-30 | Resolved: 2020-11-20

## 2020-11-20 PROCEDURE — 90662 IIV NO PRSV INCREASED AG IM: CPT | Performed by: INTERNAL MEDICINE

## 2020-11-20 PROCEDURE — 90471 IMMUNIZATION ADMIN: CPT | Performed by: INTERNAL MEDICINE

## 2020-11-20 PROCEDURE — 99214 OFFICE O/P EST MOD 30 MIN: CPT | Mod: 25 | Performed by: INTERNAL MEDICINE

## 2020-11-20 RX ORDER — ERGOCALCIFEROL 1.25 MG/1
50000 CAPSULE ORAL
Qty: 12 CAP | Refills: 1 | Status: SHIPPED | OUTPATIENT
Start: 2020-11-20 | End: 2021-04-26

## 2020-11-20 RX ORDER — INSULIN DETEMIR 100 [IU]/ML
17 INJECTION, SOLUTION SUBCUTANEOUS EVERY MORNING
Qty: 1 EACH | Refills: 6 | Status: SHIPPED | OUTPATIENT
Start: 2020-11-20 | End: 2021-05-14 | Stop reason: SDUPTHER

## 2020-11-20 RX ORDER — PEN NEEDLE, DIABETIC 30 GX3/16"
1 NEEDLE, DISPOSABLE MISCELLANEOUS DAILY
Qty: 100 EACH | Refills: 3 | Status: SHIPPED | OUTPATIENT
Start: 2020-11-20 | End: 2021-05-14 | Stop reason: SDUPTHER

## 2020-11-20 ASSESSMENT — FIBROSIS 4 INDEX: FIB4 SCORE: 1

## 2020-11-20 NOTE — PROGRESS NOTES
Established Patient    Domonique presents today with the following:    CC: Follow-up for diabetes, postural hypotension, low vitamin D    HPI:   65 y.o. female came in for above.    She has been doing well.  Her glucose meter strips ran out and she was not able to refill.  She has not been measuring at home since last visit.  Her A1c came back 9.5, improving from 15 before.   Her stomach upset is better with half dose of Metformin, tolerating Jardiance, Januvia and insulin Levemir 15 units daily.  She started to feel vision changes over the past few months.  Would like to see ophthalmology.    She continues to feel lightheaded when she changes position.  Orthostatic blood pressure today showed significant drop with position change.  159/92 supine, 128/84 sitting, 128/74 standing.     ROS  10 systems reviewed, negative except mentioned as above.      Patient Active Problem List    Diagnosis Date Noted   • Postmenopause 11/20/2020   • History of ischemic stroke 09/15/2020   • Low vitamin D level 09/15/2020   • Idiopathic peripheral neuropathy 08/04/2020   • Oliguria 08/04/2020   • Onychomycosis 08/04/2020   • Intertrigo 08/04/2020   • Other hemorrhoids 08/04/2020   • Postural dizziness with presyncope 08/04/2020   • Fall from ground level 08/04/2020   • Other fatigue 08/04/2020   • Snoring 08/04/2020   • Hair loss 08/04/2020   • Chronic constipation 08/04/2020   • Chronic left hip pain 10/19/2015   • Fibroid, uterine 06/12/2015   • Hyperlipidemia with target LDL less than 70 06/12/2015   • GERD (gastroesophageal reflux disease) 04/16/2015   • Varicose veins 04/13/2015   • Type II diabetes mellitus, uncontrolled ([facility]) 04/13/2015       Current Outpatient Medications   Medication Sig Dispense Refill   • ergocalciferol (DRISDOL) 06647 UNIT capsule Take 1 Cap by mouth every 7 days. 12 Cap 1   • Insulin Pen Needle (PEN NEEDLES) 32G X 4 MM Misc 1 Each every day. 100 Each 3   • metFORMIN (GLUCOPHAGE) 500 MG Tab Take 1 Tab  "by mouth 2 times a day, with meals for 360 days. 180 Tab 3   • Empagliflozin 25 MG Tab Take 25 mg by mouth every day. 90 Tab 3   • miconazole (MICOTIN) 2 % Cream Apply 1 Application topically 2 times a day. Apply's on feet 113 g 1   • insulin detemir (LEVEMIR FLEXTOUCH) 100 UNIT/ML injection PEN Inject 17 Units under the skin every morning. 1 Each 6   • Blood Glucose Monitoring Suppl (ACCU-CHEK GUIDE ME) w/Device Kit Apply 1 Strip to skin as directed every day. 1 Kit 3   • Blood Glucose Test Strips Test strips order: Test strips for Accucheck guide meter. Sig: use twice daily and prn ssx high or low sugar #100 RF x 3 (may exchange from brand strip compatible with her machine) 100 Each 3   • Lancets Lancets order: Lancets for Accucheck Guide meter. Sig: use once daily and prn ssx high or low sugar. #100 RF x 3 100 Each 3   • aspirin (ASA) 81 MG Chew Tab chewable tablet Take 1 Tab by mouth every day. 90 Tab 3   • rosuvastatin (CRESTOR) 5 MG Tab Take 1 Tab by mouth every evening. 90 Tab 3   • ibuprofen (MOTRIN) 200 MG Tab Take 400 mg by mouth every 6 hours as needed for Mild Pain.       No current facility-administered medications for this visit.          /84 (BP Location: Right arm, Patient Position: Sitting, BP Cuff Size: Adult) Comment: ortho 159/92 supine 128/84 sitting 128/74 standing  Pulse 97   Temp 36.3 °C (97.4 °F)   Ht 1.54 m (5' 0.63\")   Wt 76.2 kg (168 lb)   SpO2 97%   BMI 32.13 kg/m²     Physical Exam  General: Alert and oriented, No apparent distress.  Neuro: A & O x 4. Normal speech and memory.       Assessment and Plan    1. Uncontrolled type 2 diabetes mellitus with hyperglycemia (HCC)  - Improving, not at target.  Increase Levemir to 17 units daily, PCP gradual because she was having panic symptoms at night unclear whether it was hypoglycemic episode.  We were refill glucose strips and she will check during these episodes.  Continue Jardiance 25 mg daily, Metformin 500 mg twice daily. I've " refilled glucose monitoring supplies since Oct. Encouraged to let me know with any issues.    2. Postural dizziness with presyncope  - Recommended compression stockings trial.  Controlled diabetes as above.   - CORTISOL; Future    3. Low vitamin D level  - VITAMIN D,25 HYDROXY; Future  - ergocalciferol (DRISDOL) 21651 UNIT capsule; Take 1 Cap by mouth every 7 days.  Dispense: 12 Cap; Refill: 1      4. Need for vaccination  - INFLUENZA VACCINE, HIGH DOSE (65+ ONLY)    5. Need for hepatitis C screening test  - HCV Scrn ( [id number] 1xLife); Future    6. Encounter for screening mammogram for malignant neoplasm of breast  - MA-SCREENING MAMMO BILAT W/TOMOSYNTHESIS W/CAD; Future    7. Postmenopause  - DS-BONE DENSITY STUDY (DEXA); Future    8. Intertrigo  -  Refilled miconazole (MICOTIN) 2 % Cream      Return in about 3 months (around 2021).  She can come in anytime for Pap smear in between.    Signed by: Mena Christensen M.D.

## 2021-03-03 DIAGNOSIS — Z23 NEED FOR VACCINATION: ICD-10-CM

## 2021-04-05 ENCOUNTER — HOSPITAL ENCOUNTER (OUTPATIENT)
Dept: RADIOLOGY | Facility: MEDICAL CENTER | Age: 66
End: 2021-04-05
Attending: INTERNAL MEDICINE
Payer: COMMERCIAL

## 2021-04-05 DIAGNOSIS — Z78.0 POSTMENOPAUSE: ICD-10-CM

## 2021-04-05 DIAGNOSIS — Z12.31 ENCOUNTER FOR SCREENING MAMMOGRAM FOR MALIGNANT NEOPLASM OF BREAST: ICD-10-CM

## 2021-04-05 PROCEDURE — 77080 DXA BONE DENSITY AXIAL: CPT

## 2021-04-05 PROCEDURE — 77063 BREAST TOMOSYNTHESIS BI: CPT

## 2021-04-25 DIAGNOSIS — R79.89 LOW VITAMIN D LEVEL: ICD-10-CM

## 2021-04-27 RX ORDER — ERGOCALCIFEROL 1.25 MG/1
CAPSULE ORAL
Qty: 4 CAPSULE | Refills: 0 | Status: SHIPPED | OUTPATIENT
Start: 2021-04-27 | End: 2021-05-14

## 2021-05-14 ENCOUNTER — OFFICE VISIT (OUTPATIENT)
Dept: MEDICAL GROUP | Facility: MEDICAL CENTER | Age: 66
End: 2021-05-14
Payer: COMMERCIAL

## 2021-05-14 VITALS
HEIGHT: 64 IN | HEART RATE: 101 BPM | WEIGHT: 171 LBS | BODY MASS INDEX: 29.19 KG/M2 | TEMPERATURE: 97.2 F | SYSTOLIC BLOOD PRESSURE: 116 MMHG | OXYGEN SATURATION: 97 % | DIASTOLIC BLOOD PRESSURE: 62 MMHG

## 2021-05-14 DIAGNOSIS — E11.65 UNCONTROLLED TYPE 2 DIABETES MELLITUS WITH HYPERGLYCEMIA (HCC): ICD-10-CM

## 2021-05-14 DIAGNOSIS — H93.11 TINNITUS, RIGHT EAR: ICD-10-CM

## 2021-05-14 DIAGNOSIS — Z86.73 HISTORY OF ISCHEMIC STROKE: ICD-10-CM

## 2021-05-14 DIAGNOSIS — E11.42 DIABETIC POLYNEUROPATHY ASSOCIATED WITH TYPE 2 DIABETES MELLITUS (HCC): ICD-10-CM

## 2021-05-14 DIAGNOSIS — R42 POSTURAL DIZZINESS WITH PRESYNCOPE: ICD-10-CM

## 2021-05-14 DIAGNOSIS — L30.4 INTERTRIGO: ICD-10-CM

## 2021-05-14 DIAGNOSIS — R55 POSTURAL DIZZINESS WITH PRESYNCOPE: ICD-10-CM

## 2021-05-14 DIAGNOSIS — E78.5 DYSLIPIDEMIA: ICD-10-CM

## 2021-05-14 LAB
HBA1C MFR BLD: 8.5 % (ref 0–5.6)
INT CON NEG: ABNORMAL
INT CON POS: ABNORMAL

## 2021-05-14 PROCEDURE — 99214 OFFICE O/P EST MOD 30 MIN: CPT | Performed by: INTERNAL MEDICINE

## 2021-05-14 PROCEDURE — 83036 HEMOGLOBIN GLYCOSYLATED A1C: CPT | Performed by: INTERNAL MEDICINE

## 2021-05-14 RX ORDER — ASPIRIN 81 MG/1
81 TABLET, CHEWABLE ORAL DAILY
Qty: 90 TABLET | Refills: 3 | Status: SHIPPED | OUTPATIENT
Start: 2021-05-14 | End: 2022-04-08 | Stop reason: SDUPTHER

## 2021-05-14 RX ORDER — GABAPENTIN 100 MG/1
100-300 CAPSULE ORAL NIGHTLY PRN
Qty: 90 CAPSULE | Refills: 1 | Status: SHIPPED | OUTPATIENT
Start: 2021-05-14 | End: 2022-06-24 | Stop reason: SDUPTHER

## 2021-05-14 RX ORDER — INSULIN DETEMIR 100 [IU]/ML
15 INJECTION, SOLUTION SUBCUTANEOUS EVERY MORNING
Qty: 1 EACH | Refills: 6 | Status: SHIPPED | OUTPATIENT
Start: 2021-05-14 | End: 2022-04-08 | Stop reason: SDUPTHER

## 2021-05-14 RX ORDER — PEN NEEDLE, DIABETIC 30 GX3/16"
1 NEEDLE, DISPOSABLE MISCELLANEOUS DAILY
Qty: 100 EACH | Refills: 3 | Status: SHIPPED | OUTPATIENT
Start: 2021-05-14 | End: 2022-04-08 | Stop reason: SDUPTHER

## 2021-05-14 RX ORDER — ROSUVASTATIN CALCIUM 5 MG/1
5 TABLET, COATED ORAL EVERY EVENING
Qty: 90 TABLET | Refills: 3 | Status: SHIPPED | OUTPATIENT
Start: 2021-05-14 | End: 2022-04-08 | Stop reason: SDUPTHER

## 2021-05-14 RX ORDER — LANCETS 30 GAUGE
EACH MISCELLANEOUS
Qty: 100 EACH | Refills: 3 | Status: SHIPPED | OUTPATIENT
Start: 2021-05-14 | End: 2022-04-08 | Stop reason: SDUPTHER

## 2021-05-14 RX ORDER — GABAPENTIN 100 MG/1
100-300 CAPSULE ORAL NIGHTLY PRN
Qty: 90 CAPSULE | Refills: 1 | Status: SHIPPED | OUTPATIENT
Start: 2021-05-14 | End: 2021-05-14 | Stop reason: SDUPTHER

## 2021-05-14 ASSESSMENT — FIBROSIS 4 INDEX: FIB4 SCORE: 1.02

## 2021-05-14 ASSESSMENT — PATIENT HEALTH QUESTIONNAIRE - PHQ9: CLINICAL INTERPRETATION OF PHQ2 SCORE: 0

## 2021-05-14 NOTE — PROGRESS NOTES
Established Patient    Domonique presents today with the following:    CC: Ringing in her right ear, diabetes follow-up    HPI:   Domonique is a 66 y.o. female who came in for above.     Ringing in right ear x 1 year without hearing loss, no pain / discharge. Feeling like water in ear.  This is mostly at night.  She has seasonal allergies, sneezing, blowing nose in the morning..    Her fasting glucose at home ranges from 120-140 for which she is using Levemir 13 to 15 units accordingly. she did not do labs this time.  In clinic hemoglobin A1c came back 8.5 improved from 9.5 6 months ago.  She continues to have peripheral neuropathy, sometimes sensitive with blanket when she sleeps.    She continues to have orthostatic symptoms.  No history of fall.  She has not tried compression stockings yet.  Cortisol level was not done with labs.      ROS:   As above    Patient Active Problem List    Diagnosis Date Noted   • Postmenopause 11/20/2020   • History of ischemic stroke 09/15/2020   • Low vitamin D level 09/15/2020   • Diabetic polyneuropathy associated with type 2 diabetes mellitus (HCC) 08/04/2020   • Oliguria 08/04/2020   • Onychomycosis 08/04/2020   • Intertrigo 08/04/2020   • Other hemorrhoids 08/04/2020   • Postural dizziness with presyncope 08/04/2020   • Fall from ground level 08/04/2020   • Other fatigue 08/04/2020   • Snoring 08/04/2020   • Hair loss 08/04/2020   • Chronic constipation 08/04/2020   • Chronic left hip pain 10/19/2015   • Fibroid, uterine 06/12/2015   • Hyperlipidemia with target LDL less than 70 06/12/2015   • GERD (gastroesophageal reflux disease) 04/16/2015   • Varicose veins 04/13/2015   • Type II diabetes mellitus, uncontrolled (HCC) 04/13/2015       Current Outpatient Medications   Medication Sig Dispense Refill   • insulin detemir (LEVEMIR FLEXTOUCH) 100 UNIT/ML injection PEN Inject 15 Units under the skin every morning. 1 Each 6   • gabapentin (NEURONTIN) 100 MG Cap Take 1-3 Capsules  "by mouth at bedtime as needed (neuropathy). 90 capsule 1   • rosuvastatin (CRESTOR) 5 MG Tab Take 1 tablet by mouth every evening. 90 tablet 3   • aspirin (ASA) 81 MG Chew Tab chewable tablet Chew 1 tablet every day. 90 tablet 3   • metFORMIN (GLUCOPHAGE) 500 MG Tab Take 1 tablet by mouth 2 times a day with meals for 360 days. 180 tablet 3   • Blood Glucose Test Strips Test strips order: Test strips for Accucheck guide meter. Sig: use twice daily and prn ssx high or low sugar #100 RF x 3 (may exchange from brand strip compatible with her machine) 100 Each 3   • Lancets Lancets order: Lancets for Accucheck Guide meter. Sig: use once daily and prn ssx high or low sugar. #100 RF x 3 100 Each 3   • Insulin Pen Needle (PEN NEEDLES) 32G X 4 MM Misc 1 Each every day. 100 Each 3   • Empagliflozin 25 MG Tab Take 25 mg by mouth every day. 90 tablet 3   • miconazole (MICOTIN) 2 % Cream Apply 1 Application topically 2 times a day. Apply on feet rash and groin rash 113 g 1   • ibuprofen (MOTRIN) 200 MG Tab Take 400 mg by mouth every 6 hours as needed for Mild Pain.       No current facility-administered medications for this visit.         /62 (BP Location: Left arm, Patient Position: Sitting, BP Cuff Size: Adult)   Pulse (!) 101   Temp 36.2 °C (97.2 °F)   Ht 1.626 m (5' 4\")   Wt 77.6 kg (171 lb)   SpO2 97%   BMI 29.35 kg/m²     Physical Exam  General: Alert and oriented, No apparent distress.  Bilateral tympanic membranes are clear.  Throat: Clear no erythema or exudates noted.  Lungs: Clear to auscultation bilaterally without any wheezing, crepitations.  Cardiovascular: Regular rate and rhythm. No murmurs, rubs or gallops.  Extremities: No clubbing, cyanosis, edema.         Assessment and Plan    1. Tinnitus, right ear  Try over-the-counter Claritin for 2 weeks to see if there is any difference.  If no relief, will recommend ENT referral.    2. Uncontrolled type 2 diabetes mellitus with hyperglycemia (HCC)  This is " improving although is not quite at the target.  Given her age and orthostatic symptoms, she might not tolerate tighter glucose control.  Target A1c will be less than 8.  - Recommend consistent use of long-acting insulin 15 units every day as long as fasting glucose is above 100.  - Continue Jardiance and Metformin.   - If A1c continues to improve next time, will consider adding DPP 4 inhibitor to cut down insulin needs and to prevent hypoglycemic episode.  Her panic symptoms at night has significantly decreased, unclear if it is from hypoglycemia episodes.     3. Diabetic polyneuropathy associated with type 2 diabetes mellitus (HCC)  - Try gabapentin (NEURONTIN) 100 MG Cap; Take 1-3 Capsules by mouth at bedtime as needed (neuropathy).  Dispense: 90 capsule; Refill: 1    4. Postural dizziness with presyncope  -Try compression stocking.  Do labs before next appointment.    5. History of ischemic stroke  6. Dyslipidemia  Stable.  Labs before next appointment.  - rosuvastatin (CRESTOR) 5 MG Tab; Take 1 tablet by mouth every evening.  Dispense: 90 tablet; Refill: 3  - aspirin (ASA) 81 MG Chew Tab chewable tablet; Chew 1 tablet every day.  Dispense: 90 tablet; Refill: 3     7. Intertrigo  This has not resolved to discontinue moisture/sweating when she walks.  - refilled miconazole (MICOTIN) 2 % Cream; Apply 1 Application topically 2 times a day. Apply on feet rash and groin rash  Dispense: 113 g; Refill: 1        Return in about 3 months (around 8/14/2021).         Signed by: Mena Christensen M.D.

## 2021-09-17 RX ORDER — BLOOD SUGAR DIAGNOSTIC
STRIP MISCELLANEOUS
Qty: 100 STRIP | Refills: 3 | Status: SHIPPED | OUTPATIENT
Start: 2021-09-17 | End: 2022-04-08 | Stop reason: SDUPTHER

## 2022-04-08 ENCOUNTER — OFFICE VISIT (OUTPATIENT)
Dept: MEDICAL GROUP | Facility: MEDICAL CENTER | Age: 67
End: 2022-04-08
Payer: COMMERCIAL

## 2022-04-08 VITALS
BODY MASS INDEX: 30.48 KG/M2 | SYSTOLIC BLOOD PRESSURE: 134 MMHG | HEIGHT: 63 IN | WEIGHT: 172 LBS | OXYGEN SATURATION: 96 % | HEART RATE: 94 BPM | DIASTOLIC BLOOD PRESSURE: 70 MMHG | TEMPERATURE: 97.5 F

## 2022-04-08 DIAGNOSIS — R49.9 CHANGE IN VOICE: ICD-10-CM

## 2022-04-08 DIAGNOSIS — E11.65 UNCONTROLLED TYPE 2 DIABETES MELLITUS WITH HYPERGLYCEMIA (HCC): ICD-10-CM

## 2022-04-08 DIAGNOSIS — R10.13 EPIGASTRIC PAIN: ICD-10-CM

## 2022-04-08 DIAGNOSIS — Z86.73 HISTORY OF ISCHEMIC STROKE: ICD-10-CM

## 2022-04-08 DIAGNOSIS — E78.5 DYSLIPIDEMIA: ICD-10-CM

## 2022-04-08 PROCEDURE — 99214 OFFICE O/P EST MOD 30 MIN: CPT | Performed by: INTERNAL MEDICINE

## 2022-04-08 RX ORDER — LANCETS 30 GAUGE
EACH MISCELLANEOUS
Qty: 100 EACH | Refills: 3 | Status: SHIPPED | OUTPATIENT
Start: 2022-04-08 | End: 2023-05-09

## 2022-04-08 RX ORDER — BLOOD SUGAR DIAGNOSTIC
STRIP MISCELLANEOUS
Qty: 100 STRIP | Refills: 3 | Status: SHIPPED | OUTPATIENT
Start: 2022-04-08 | End: 2022-10-25

## 2022-04-08 RX ORDER — PEN NEEDLE, DIABETIC 30 GX3/16"
1 NEEDLE, DISPOSABLE MISCELLANEOUS DAILY
Qty: 100 EACH | Refills: 3 | Status: SHIPPED | OUTPATIENT
Start: 2022-04-08 | End: 2023-05-09

## 2022-04-08 RX ORDER — INSULIN DETEMIR 100 [IU]/ML
16 INJECTION, SOLUTION SUBCUTANEOUS EVERY MORNING
Qty: 1 EACH | Refills: 6 | Status: SHIPPED | OUTPATIENT
Start: 2022-04-08 | End: 2022-05-27

## 2022-04-08 RX ORDER — ASPIRIN 81 MG/1
81 TABLET, CHEWABLE ORAL DAILY
Qty: 30 TABLET | Refills: 0 | Status: SHIPPED | OUTPATIENT
Start: 2022-04-08 | End: 2022-05-27 | Stop reason: SDUPTHER

## 2022-04-08 RX ORDER — ROSUVASTATIN CALCIUM 5 MG/1
5 TABLET, COATED ORAL EVERY EVENING
Qty: 30 TABLET | Refills: 0 | Status: SHIPPED | OUTPATIENT
Start: 2022-04-08 | End: 2022-05-27 | Stop reason: SDUPTHER

## 2022-04-08 ASSESSMENT — PATIENT HEALTH QUESTIONNAIRE - PHQ9: CLINICAL INTERPRETATION OF PHQ2 SCORE: 0

## 2022-04-08 ASSESSMENT — FIBROSIS 4 INDEX: FIB4 SCORE: 1.03

## 2022-04-08 NOTE — PROGRESS NOTES
Established Patient    Domonique presents today with the following:    CC: Change in her voice, upper abdominal pain, medication refill for chronic medical problems    HPI:   Domonique is a 67 y.o. female who came in for above.    She lost to follow-up for 11 months due to her  MI and financial reasons.    Over the past month, she has noticed change in her voice during prolonged conversations.  She will lose her voice if she talks for a long time.  With rest, she can recover during the day and recurs with long conversations. She also feels like she has secretions /saliva pooling in the throat and has to be careful during swallowing. She denies choking, but has clearing /cough with liquids and solids intermittently.    3 weeks ago, she developed severe epigastric pain, could not go to pack.  Since then he has been intermittent a few hours after heavy lunch or at bed time.  She is concerned about pancreatitis.  No nausea, vomiting, bowel movements are normal except for chronic constipation.     Her glucose were around 140s, but higher recently with abdominal pain FBS 180s today.    She needs medication refill.  But she is not sure if she can refill this week because she has no money.      ROS:   As above    Patient Active Problem List    Diagnosis Date Noted   • Postmenopause 11/20/2020   • History of ischemic stroke 09/15/2020   • Low vitamin D level 09/15/2020   • Diabetic polyneuropathy associated with type 2 diabetes mellitus (HCC) 08/04/2020   • Oliguria 08/04/2020   • Onychomycosis 08/04/2020   • Intertrigo 08/04/2020   • Other hemorrhoids 08/04/2020   • Postural dizziness with presyncope 08/04/2020   • Fall from ground level 08/04/2020   • Other fatigue 08/04/2020   • Snoring 08/04/2020   • Hair loss 08/04/2020   • Chronic constipation 08/04/2020   • Chronic left hip pain 10/19/2015   • Fibroid, uterine 06/12/2015   • Hyperlipidemia with target LDL less than 70 06/12/2015   • GERD (gastroesophageal  "reflux disease) 04/16/2015   • Varicose veins 04/13/2015   • Type II diabetes mellitus, uncontrolled ([facility]) 04/13/2015       Current Outpatient Medications   Medication Sig Dispense Refill   • rosuvastatin (CRESTOR) 5 MG Tab Take 1 Tablet by mouth every evening. 30 Tablet 0   • aspirin (ASA) 81 MG Chew Tab chewable tablet Chew 1 Tablet every day. 30 Tablet 0   • metFORMIN (GLUCOPHAGE) 500 MG Tab Take 1 Tablet by mouth 2 times a day with meals. 60 Tablet 0   • Insulin Pen Needle (PEN NEEDLES) 32G X 4 MM Misc 1 Each every day. 100 Each 3   • Empagliflozin 25 MG Tab Take 25 mg by mouth every day. 30 Tablet 0   • insulin detemir (LEVEMIR FLEXTOUCH) 100 UNIT/ML injection PEN Inject 16 Units under the skin every morning. 1 Each 6   • Lancets Lancets order: Lancets for Accucheck Guide meter. Sig: use once daily and prn ssx high or low sugar. #100 RF x 3 100 Each 3   • glucose blood (ACCU-CHEK GUIDE) strip USE TWICE DAILY AND AS NEEDED FOR HIGH OR LOW BLOOD SUGAR 100 Strip 3   • gabapentin (NEURONTIN) 100 MG Cap Take 1-3 Capsules by mouth at bedtime as needed (neuropathy). 90 capsule 1   • Blood Glucose Test Strips Test strips order: Test strips for Accucheck guide meter. Sig: use twice daily and prn ssx high or low sugar #100 RF x 3 (may exchange from brand strip compatible with her machine) 100 Each 3   • miconazole (MICOTIN) 2 % Cream Apply 1 Application topically 2 times a day. Apply on feet rash and groin rash 113 g 1   • ibuprofen (MOTRIN) 200 MG Tab Take 400 mg by mouth every 6 hours as needed for Mild Pain.       No current facility-administered medications for this visit.         /70 (BP Location: Left arm, Patient Position: Sitting, BP Cuff Size: Adult)   Pulse 94   Temp 36.4 °C (97.5 °F)   Ht 1.6 m (5' 3\")   Wt 78 kg (172 lb)   SpO2 96%   BMI 30.47 kg/m²     Physical Exam  General: Alert and oriented, No apparent distress.  Throat: Clear no  exudates noted.  Postnasal drip present  Neck: Supple. No " cervical or supraclavicular lymphadenopathy noted. Thyroid not enlarged.  Lungs: Clear to auscultation bilaterally without any wheezing, crepitations.  Cardiovascular: Regular rate and rhythm. No murmurs, rubs or gallops.  Abdomen: Bowel sound +, soft,  Tender in epigastrium and left UQ, no rebound or guarding, no palpable organomegaly  Extremities: No   edema.         Assessment and Plan    1. Change in voice  - Unclear etiology.  Since it is intermittent, try treating postnasal drip to clear the secretions.  Recommended over-the-counter Zyrtec.  - If no improvement, will recommend speech evaluation due to prior history of stroke, possibly ENT evaluation as well.    2. Epigastric pain  At this point, she is tolerating light diet at home.  No need for admission with concern of pancreatitis.  Her pain is better compared to 3 weeks ago.  Continue current diet and try PPI, omeprazole OTC 20 mg twice daily for possible PUD.  - CBC WITH DIFFERENTIAL; Future  - LIPASE; Future    3. Uncontrolled type 2 diabetes mellitus with hyperglycemia (HCC)  Refilled medication for 1 month.  Need labs.  Recommended to check with [facility], local organization for some glucose monitoring supplies and insulin.   - metFORMIN (GLUCOPHAGE) 500 MG Tab; Take 1 Tablet by mouth 2 times a day with meals.  Dispense: 60 Tablet; Refill: 0  - Insulin Pen Needle (PEN NEEDLES) 32G X 4 MM Misc; 1 Each every day.  Dispense: 100 Each; Refill: 3  - Empagliflozin 25 MG Tab; Take 25 mg by mouth every day.  Dispense: 30 Tablet; Refill: 0  - insulin detemir (LEVEMIR FLEXTOUCH) 100 UNIT/ML injection PEN; Inject 16 Units under the skin every morning.  Dispense: 1 Each; Refill: 6  - Lancets; Lancets order: Lancets for Accucheck Guide meter. Sig: use once daily and prn ssx high or low sugar. #100 RF x 3  Dispense: 100 Each; Refill: 3  - glucose blood (ACCU-CHEK GUIDE) strip; USE TWICE DAILY AND AS NEEDED FOR HIGH OR LOW BLOOD SUGAR  Dispense: 100 Strip; Refill:  3  - Comp Metabolic Panel; Future  - Lipid Profile; Future  - HEMOGLOBIN A1C; Future  - MICROALBUMIN CREAT RATIO URINE; Future    4. History of ischemic stroke  Refilled meds.  - rosuvastatin (CRESTOR) 5 MG Tab; Take 1 Tablet by mouth every evening.  Dispense: 30 Tablet; Refill: 0  - aspirin (ASA) 81 MG Chew Tab chewable tablet; Chew 1 Tablet every day.  Dispense: 30 Tablet; Refill: 0    5. Dyslipidemia  - rosuvastatin (CRESTOR) 5 MG Tab; Take 1 Tablet by mouth every evening.  Dispense: 30 Tablet; Refill: 0        Return in about 1 month (around 5/8/2022).       Signed by: Mena Christensen M.D.

## 2022-05-16 ENCOUNTER — HOSPITAL ENCOUNTER (OUTPATIENT)
Dept: LAB | Facility: MEDICAL CENTER | Age: 67
End: 2022-05-16
Attending: INTERNAL MEDICINE
Payer: COMMERCIAL

## 2022-05-16 DIAGNOSIS — R10.13 EPIGASTRIC PAIN: ICD-10-CM

## 2022-05-16 DIAGNOSIS — E11.65 UNCONTROLLED TYPE 2 DIABETES MELLITUS WITH HYPERGLYCEMIA (HCC): ICD-10-CM

## 2022-05-16 LAB
ALBUMIN SERPL BCP-MCNC: 4.1 G/DL (ref 3.2–4.9)
ALBUMIN/GLOB SERPL: 1.3 G/DL
ALP SERPL-CCNC: 88 U/L (ref 30–99)
ALT SERPL-CCNC: 10 U/L (ref 2–50)
ANION GAP SERPL CALC-SCNC: 10 MMOL/L (ref 7–16)
AST SERPL-CCNC: 14 U/L (ref 12–45)
BASOPHILS # BLD AUTO: 0.8 % (ref 0–1.8)
BASOPHILS # BLD: 0.07 K/UL (ref 0–0.12)
BILIRUB SERPL-MCNC: 0.3 MG/DL (ref 0.1–1.5)
BUN SERPL-MCNC: 19 MG/DL (ref 8–22)
CALCIUM SERPL-MCNC: 9.5 MG/DL (ref 8.5–10.5)
CHLORIDE SERPL-SCNC: 106 MMOL/L (ref 96–112)
CHOLEST SERPL-MCNC: 140 MG/DL (ref 100–199)
CO2 SERPL-SCNC: 25 MMOL/L (ref 20–33)
CREAT SERPL-MCNC: 0.66 MG/DL (ref 0.5–1.4)
CREAT UR-MCNC: 72.47 MG/DL
EOSINOPHIL # BLD AUTO: 0.26 K/UL (ref 0–0.51)
EOSINOPHIL NFR BLD: 2.9 % (ref 0–6.9)
ERYTHROCYTE [DISTWIDTH] IN BLOOD BY AUTOMATED COUNT: 50.7 FL (ref 35.9–50)
EST. AVERAGE GLUCOSE BLD GHB EST-MCNC: 220 MG/DL
GFR SERPLBLD CREATININE-BSD FMLA CKD-EPI: 97 ML/MIN/1.73 M 2
GLOBULIN SER CALC-MCNC: 3.2 G/DL (ref 1.9–3.5)
GLUCOSE SERPL-MCNC: 213 MG/DL (ref 65–99)
HBA1C MFR BLD: 9.3 % (ref 4–5.6)
HCT VFR BLD AUTO: 42.5 % (ref 37–47)
HDLC SERPL-MCNC: 42 MG/DL
HGB BLD-MCNC: 13.4 G/DL (ref 12–16)
IMM GRANULOCYTES # BLD AUTO: 0.03 K/UL (ref 0–0.11)
IMM GRANULOCYTES NFR BLD AUTO: 0.3 % (ref 0–0.9)
LDLC SERPL CALC-MCNC: 80 MG/DL
LIPASE SERPL-CCNC: 22 U/L (ref 11–82)
LYMPHOCYTES # BLD AUTO: 3.17 K/UL (ref 1–4.8)
LYMPHOCYTES NFR BLD: 35.9 % (ref 22–41)
MCH RBC QN AUTO: 28.8 PG (ref 27–33)
MCHC RBC AUTO-ENTMCNC: 31.5 G/DL (ref 33.6–35)
MCV RBC AUTO: 91.4 FL (ref 81.4–97.8)
MICROALBUMIN UR-MCNC: 3 MG/DL
MICROALBUMIN/CREAT UR: 41 MG/G (ref 0–30)
MONOCYTES # BLD AUTO: 0.52 K/UL (ref 0–0.85)
MONOCYTES NFR BLD AUTO: 5.9 % (ref 0–13.4)
NEUTROPHILS # BLD AUTO: 4.78 K/UL (ref 2–7.15)
NEUTROPHILS NFR BLD: 54.2 % (ref 44–72)
NRBC # BLD AUTO: 0 K/UL
NRBC BLD-RTO: 0 /100 WBC
PLATELET # BLD AUTO: 286 K/UL (ref 164–446)
PMV BLD AUTO: 11.4 FL (ref 9–12.9)
POTASSIUM SERPL-SCNC: 4.2 MMOL/L (ref 3.6–5.5)
PROT SERPL-MCNC: 7.3 G/DL (ref 6–8.2)
RBC # BLD AUTO: 4.65 M/UL (ref 4.2–5.4)
SODIUM SERPL-SCNC: 141 MMOL/L (ref 135–145)
TRIGL SERPL-MCNC: 88 MG/DL (ref 0–149)
WBC # BLD AUTO: 8.8 K/UL (ref 4.8–10.8)

## 2022-05-16 PROCEDURE — 82043 UR ALBUMIN QUANTITATIVE: CPT

## 2022-05-16 PROCEDURE — 80061 LIPID PANEL: CPT

## 2022-05-16 PROCEDURE — 82570 ASSAY OF URINE CREATININE: CPT

## 2022-05-16 PROCEDURE — 83690 ASSAY OF LIPASE: CPT

## 2022-05-16 PROCEDURE — 36415 COLL VENOUS BLD VENIPUNCTURE: CPT

## 2022-05-16 PROCEDURE — 80053 COMPREHEN METABOLIC PANEL: CPT

## 2022-05-16 PROCEDURE — 83036 HEMOGLOBIN GLYCOSYLATED A1C: CPT

## 2022-05-16 PROCEDURE — 85025 COMPLETE CBC W/AUTO DIFF WBC: CPT

## 2022-05-27 ENCOUNTER — OFFICE VISIT (OUTPATIENT)
Dept: MEDICAL GROUP | Facility: MEDICAL CENTER | Age: 67
End: 2022-05-27
Payer: COMMERCIAL

## 2022-05-27 VITALS
WEIGHT: 172.8 LBS | SYSTOLIC BLOOD PRESSURE: 136 MMHG | OXYGEN SATURATION: 93 % | BODY MASS INDEX: 30.62 KG/M2 | HEART RATE: 87 BPM | HEIGHT: 63 IN | TEMPERATURE: 97.9 F | DIASTOLIC BLOOD PRESSURE: 68 MMHG

## 2022-05-27 DIAGNOSIS — R80.9 MICROALBUMINURIA: ICD-10-CM

## 2022-05-27 DIAGNOSIS — E78.5 DYSLIPIDEMIA: ICD-10-CM

## 2022-05-27 DIAGNOSIS — R21 SKIN RASH: ICD-10-CM

## 2022-05-27 DIAGNOSIS — R10.13 EPIGASTRIC PAIN: ICD-10-CM

## 2022-05-27 DIAGNOSIS — Z86.73 HISTORY OF ISCHEMIC STROKE: ICD-10-CM

## 2022-05-27 DIAGNOSIS — R49.9 CHANGE IN VOICE: ICD-10-CM

## 2022-05-27 DIAGNOSIS — E11.65 UNCONTROLLED TYPE 2 DIABETES MELLITUS WITH HYPERGLYCEMIA (HCC): ICD-10-CM

## 2022-05-27 PROCEDURE — 99214 OFFICE O/P EST MOD 30 MIN: CPT | Performed by: INTERNAL MEDICINE

## 2022-05-27 RX ORDER — INSULIN DETEMIR 100 [IU]/ML
25 INJECTION, SOLUTION SUBCUTANEOUS EVERY MORNING
Qty: 5 EACH | Refills: 2 | Status: SHIPPED | OUTPATIENT
Start: 2022-05-27 | End: 2022-12-06 | Stop reason: SDUPTHER

## 2022-05-27 RX ORDER — OMEPRAZOLE 20 MG/1
20 CAPSULE, DELAYED RELEASE ORAL DAILY
Qty: 90 CAPSULE | Refills: 3 | Status: SHIPPED | OUTPATIENT
Start: 2022-05-27 | End: 2023-04-04

## 2022-05-27 RX ORDER — ASPIRIN 81 MG/1
81 TABLET, CHEWABLE ORAL DAILY
Qty: 90 TABLET | Refills: 3 | Status: SHIPPED | OUTPATIENT
Start: 2022-05-27 | End: 2022-12-06 | Stop reason: SDUPTHER

## 2022-05-27 RX ORDER — ROSUVASTATIN CALCIUM 5 MG/1
5 TABLET, COATED ORAL EVERY EVENING
Qty: 90 TABLET | Refills: 3 | Status: SHIPPED | OUTPATIENT
Start: 2022-05-27 | End: 2022-06-24 | Stop reason: SDUPTHER

## 2022-05-27 RX ORDER — TRIAMCINOLONE ACETONIDE 1 MG/G
CREAM TOPICAL
Qty: 45 G | Refills: 3 | Status: SHIPPED | OUTPATIENT
Start: 2022-05-27 | End: 2023-04-04

## 2022-05-27 ASSESSMENT — FIBROSIS 4 INDEX: FIB4 SCORE: 1.02

## 2022-05-28 NOTE — PROGRESS NOTES
Established Patient    Domonique presents today with the following:    CC: Follow-up for chronic medical problems    HPI:   Domonique is a 67 y.o. female who came in for above.    Her epigastric pain, left upper quadrant pain was better with omeprazole.  She stopped after 1 month and it is worse again.  She was wondering if she can continue to take that.    She has been having skin rashes off and on that are itchy throughout her body for years.    She is using insulin 19-25 IU depending on fasting glucose. A1C 9.3 this time.        ROS:   As above    Patient Active Problem List    Diagnosis Date Noted   • Postmenopause 11/20/2020   • History of ischemic stroke 09/15/2020   • Low vitamin D level 09/15/2020   • Diabetic polyneuropathy associated with type 2 diabetes mellitus (HCC) 08/04/2020   • Oliguria 08/04/2020   • Onychomycosis 08/04/2020   • Intertrigo 08/04/2020   • Other hemorrhoids 08/04/2020   • Postural dizziness with presyncope 08/04/2020   • Fall from ground level 08/04/2020   • Other fatigue 08/04/2020   • Snoring 08/04/2020   • Hair loss 08/04/2020   • Chronic constipation 08/04/2020   • Chronic left hip pain 10/19/2015   • Fibroid, uterine 06/12/2015   • Hyperlipidemia with target LDL less than 70 06/12/2015   • GERD (gastroesophageal reflux disease) 04/16/2015   • Varicose veins 04/13/2015   • Type II diabetes mellitus, uncontrolled ([facility]) 04/13/2015       Current Outpatient Medications   Medication Sig Dispense Refill   • omeprazole (PRILOSEC) 20 MG delayed-release capsule Take 1 Capsule by mouth every day. 90 Capsule 3   • insulin detemir (LEVEMIR FLEXTOUCH) 100 UNIT/ML injection PEN Inject 25 Units under the skin every morning. 5 Each 2   • triamcinolone acetonide (KENALOG) 0.1 % Cream Apply a thin layer on skin rashes twice daily x 1 week as needed for flares 45 g 3   • rosuvastatin (CRESTOR) 5 MG Tab Take 1 Tablet by mouth every evening. 90 Tablet 3   • aspirin (ASA) 81 MG Chew Tab chewable  "tablet Chew 1 Tablet every day. 90 Tablet 3   • metFORMIN (GLUCOPHAGE) 500 MG Tab Take 1 Tablet by mouth 2 times a day with meals. 180 Tablet 3   • Empagliflozin 25 MG Tab Take 25 mg by mouth every day. 90 Tablet 3   • Insulin Pen Needle (PEN NEEDLES) 32G X 4 MM Misc 1 Each every day. 100 Each 3   • Lancets Lancets order: Lancets for Accucheck Guide meter. Sig: use once daily and prn ssx high or low sugar. #100 RF x 3 100 Each 3   • glucose blood (ACCU-CHEK GUIDE) strip USE TWICE DAILY AND AS NEEDED FOR HIGH OR LOW BLOOD SUGAR 100 Strip 3   • gabapentin (NEURONTIN) 100 MG Cap Take 1-3 Capsules by mouth at bedtime as needed (neuropathy). 90 capsule 1   • Blood Glucose Test Strips Test strips order: Test strips for Accucheck guide meter. Sig: use twice daily and prn ssx high or low sugar #100 RF x 3 (may exchange from brand strip compatible with her machine) 100 Each 3   • miconazole (MICOTIN) 2 % Cream Apply 1 Application topically 2 times a day. Apply on feet rash and groin rash 113 g 1   • ibuprofen (MOTRIN) 200 MG Tab Take 400 mg by mouth every 6 hours as needed for Mild Pain.       No current facility-administered medications for this visit.         /68 (BP Location: Left arm, Patient Position: Sitting, BP Cuff Size: Adult)   Pulse 87   Temp 36.6 °C (97.9 °F) (Temporal)   Ht 1.6 m (5' 3\")   Wt 78.4 kg (172 lb 12.8 oz)   SpO2 93%   BMI 30.61 kg/m²     Physical Exam  General: Alert and oriented, No apparent distress.       Assessment and Plan    1. Epigastric pain  - restart omeprazole (PRILOSEC) 20 MG delayed-release capsule; Take 1 Capsule by mouth every day.  Dispense: 90 Capsule; Refill: 3  -Recommended to contact if she still has pain despite PPI. Would consider GI / EGD.    2. Skin rash  - try triamcinolone acetonide (KENALOG) 0.1 % Cream; Apply a thin layer on skin rashes twice daily x 1 week as needed for flares  Dispense: 45 g; Refill: 3    3. Uncontrolled type 2 diabetes mellitus with " hyperglycemia (HCC)  Increase long-acting insulin to 22 units daily. Keep FBS record and bring in in one month.  - insulin detemir (LEVEMIR FLEXTOUCH) 100 UNIT/ML injection PEN; Inject 25 Units under the skin every morning.  Dispense: 5 Each; Refill: 2  - metFORMIN (GLUCOPHAGE) 500 MG Tab; Take 1 Tablet by mouth 2 times a day with meals.  Dispense: 180 Tablet; Refill: 3  - Empagliflozin 25 MG Tab; Take 25 mg by mouth every day.  Dispense: 90 Tablet; Refill: 3    4. Microalbuminuria  -Discussed importance of controlling DM.  BP is slightly above target but she would not tolerate BP medication due to orthostatic hypotension.     5. Dyslipidemia  - rosuvastatin (CRESTOR) 5 MG Tab; Take 1 Tablet by mouth every evening.  Dispense: 90 Tablet; Refill: 3    6. History of ischemic stroke  - rosuvastatin (CRESTOR) 5 MG Tab; Take 1 Tablet by mouth every evening.  Dispense: 90 Tablet; Refill: 3  - aspirin (ASA) 81 MG Chew Tab chewable tablet; Chew 1 Tablet every day.  Dispense: 90 Tablet; Refill: 3    7. Change in voice  - better with zyrtec.  Likely related to environmental allergies.      Return in about 1 month (around 6/27/2022).         Signed by: Mena Christensen M.D.

## 2022-06-24 ENCOUNTER — OFFICE VISIT (OUTPATIENT)
Dept: MEDICAL GROUP | Facility: MEDICAL CENTER | Age: 67
End: 2022-06-24
Payer: COMMERCIAL

## 2022-06-24 VITALS
SYSTOLIC BLOOD PRESSURE: 132 MMHG | BODY MASS INDEX: 30.76 KG/M2 | WEIGHT: 173.6 LBS | OXYGEN SATURATION: 95 % | HEART RATE: 96 BPM | HEIGHT: 63 IN | DIASTOLIC BLOOD PRESSURE: 72 MMHG | TEMPERATURE: 97.8 F

## 2022-06-24 DIAGNOSIS — Z86.73 HISTORY OF ISCHEMIC STROKE: ICD-10-CM

## 2022-06-24 DIAGNOSIS — E78.5 DYSLIPIDEMIA: ICD-10-CM

## 2022-06-24 DIAGNOSIS — Z11.59 NEED FOR HEPATITIS C SCREENING TEST: ICD-10-CM

## 2022-06-24 DIAGNOSIS — M16.0 PRIMARY OSTEOARTHRITIS OF BOTH HIPS: ICD-10-CM

## 2022-06-24 DIAGNOSIS — E11.65 UNCONTROLLED TYPE 2 DIABETES MELLITUS WITH HYPERGLYCEMIA (HCC): ICD-10-CM

## 2022-06-24 DIAGNOSIS — B35.1 ONYCHOMYCOSIS: ICD-10-CM

## 2022-06-24 DIAGNOSIS — R10.13 EPIGASTRIC PAIN: ICD-10-CM

## 2022-06-24 DIAGNOSIS — E11.42 DIABETIC POLYNEUROPATHY ASSOCIATED WITH TYPE 2 DIABETES MELLITUS (HCC): ICD-10-CM

## 2022-06-24 PROCEDURE — 99214 OFFICE O/P EST MOD 30 MIN: CPT | Performed by: INTERNAL MEDICINE

## 2022-06-24 RX ORDER — ROSUVASTATIN CALCIUM 5 MG/1
5 TABLET, COATED ORAL EVERY EVENING
Qty: 90 TABLET | Refills: 3 | Status: SHIPPED | OUTPATIENT
Start: 2022-06-24 | End: 2022-12-06 | Stop reason: SDUPTHER

## 2022-06-24 RX ORDER — GABAPENTIN 100 MG/1
100-300 CAPSULE ORAL NIGHTLY PRN
Qty: 90 CAPSULE | Refills: 2 | Status: SHIPPED | OUTPATIENT
Start: 2022-06-24 | End: 2023-04-04

## 2022-06-24 RX ORDER — TERBINAFINE HYDROCHLORIDE 250 MG/1
250 TABLET ORAL DAILY
Qty: 90 TABLET | Refills: 0 | Status: SHIPPED | OUTPATIENT
Start: 2022-06-24 | End: 2022-12-06

## 2022-06-24 ASSESSMENT — FIBROSIS 4 INDEX: FIB4 SCORE: 1.02

## 2022-06-24 NOTE — PROGRESS NOTES
Established Patient    Domonique presents today with the following:    CC: Follow-up for chronic medical problems    HPI:   Domonique is a 67 y.o. female who came in for above.    She said her epigastric pain is partially better with omeprazole 20 mg daily, but has not resolved.    She is also complaining of multiple joint pain, shoulders, elbows, groin / hip pain.    She has been having toenail pain from nail fungus.  Tried topical, did not work.     Her fasting glucose has been 220s in the past month.  She did not receive Jardiance which she has been on for 2 months now.  Last prescription was sent last month, unclear why she did not receive. unfortunately, she did not call our office.  She has been doing Levemir 25 units daily.    ROS:   As above    Patient Active Problem List    Diagnosis Date Noted   • Postmenopause 11/20/2020   • History of ischemic stroke 09/15/2020   • Low vitamin D level 09/15/2020   • Diabetic polyneuropathy associated with type 2 diabetes mellitus (HCC) 08/04/2020   • Oliguria 08/04/2020   • Onychomycosis 08/04/2020   • Intertrigo 08/04/2020   • Other hemorrhoids 08/04/2020   • Postural dizziness with presyncope 08/04/2020   • Fall from ground level 08/04/2020   • Other fatigue 08/04/2020   • Snoring 08/04/2020   • Hair loss 08/04/2020   • Chronic constipation 08/04/2020   • Chronic left hip pain 10/19/2015   • Fibroid, uterine 06/12/2015   • Hyperlipidemia with target LDL less than 70 06/12/2015   • GERD (gastroesophageal reflux disease) 04/16/2015   • Varicose veins 04/13/2015   • Type II diabetes mellitus, uncontrolled (HCC) 04/13/2015       Current Outpatient Medications   Medication Sig Dispense Refill   • gabapentin (NEURONTIN) 100 MG Cap Take 1-3 Capsules by mouth at bedtime as needed (tingling /numbness in feet). 90 Capsule 2   • Empagliflozin 25 MG Tab Take 25 mg by mouth every day. 90 Tablet 3   • rosuvastatin (CRESTOR) 5 MG Tab Take 1 Tablet by mouth every evening. 90 Tablet  "3   • terbinafine (LAMISIL) 250 MG Tab Take 1 Tablet by mouth every day. 90 Tablet 0   • omeprazole (PRILOSEC) 20 MG delayed-release capsule Take 1 Capsule by mouth every day. 90 Capsule 3   • insulin detemir (LEVEMIR FLEXTOUCH) 100 UNIT/ML injection PEN Inject 25 Units under the skin every morning. 5 Each 2   • triamcinolone acetonide (KENALOG) 0.1 % Cream Apply a thin layer on skin rashes twice daily x 1 week as needed for flares 45 g 3   • aspirin (ASA) 81 MG Chew Tab chewable tablet Chew 1 Tablet every day. 90 Tablet 3   • metFORMIN (GLUCOPHAGE) 500 MG Tab Take 1 Tablet by mouth 2 times a day with meals. 180 Tablet 3   • Insulin Pen Needle (PEN NEEDLES) 32G X 4 MM Misc 1 Each every day. 100 Each 3   • Lancets Lancets order: Lancets for Accucheck Guide meter. Sig: use once daily and prn ssx high or low sugar. #100 RF x 3 100 Each 3   • glucose blood (ACCU-CHEK GUIDE) strip USE TWICE DAILY AND AS NEEDED FOR HIGH OR LOW BLOOD SUGAR 100 Strip 3   • Blood Glucose Test Strips Test strips order: Test strips for Accucheck guide meter. Sig: use twice daily and prn ssx high or low sugar #100 RF x 3 (may exchange from brand strip compatible with her machine) 100 Each 3   • miconazole (MICOTIN) 2 % Cream Apply 1 Application topically 2 times a day. Apply on feet rash and groin rash 113 g 1   • ibuprofen (MOTRIN) 200 MG Tab Take 400 mg by mouth every 6 hours as needed for Mild Pain.       No current facility-administered medications for this visit.         /72 (BP Location: Right arm, Patient Position: Sitting, BP Cuff Size: Adult)   Pulse 96   Temp 36.6 °C (97.8 °F) (Temporal)   Ht 1.6 m (5' 3\")   Wt 78.7 kg (173 lb 9.6 oz)   SpO2 95%   BMI 30.75 kg/m²     Physical Exam  General: Alert and oriented, No apparent distress.  Lungs: Clear to auscultation bilaterally without any wheezing, crepitations.  Cardiovascular: Regular rate and rhythm. No murmurs, rubs or gallops.  Abdomen: Bowel sound +, soft, non tender, no " rebound or guarding, no palpable organomegaly  Extremities: No clubbing, cyanosis, edema.  Severe onychomycosis involving the whole nail on a few toenails both sides.         Assessment and Plan    1. Epigastric pain  Options given to increase PPI vs seeing GI.  She would like to see GI.  - Referral to Gastroenterology    2. Primary osteoarthritis of both hips  Discussed that she has severe osteoarthritis on prior x-ray.  Most likely will need hip arthroplasty.  She can consider injections if she is not ready for surgery. She is medically not optimized to have surgery either due to uncontrolled diabetes.  - Referral to Orthopedics    3. Onychomycosis  - start terbinafine (LAMISIL) 250 MG Tab; Take 1 Tablet by mouth every day.  Dispense: 90 Tablet; Refill: 0    4. Diabetic polyneuropathy associated with type 2 diabetes mellitus (HCC)  - try gabapentin (NEURONTIN) 100 MG Cap; Take 1-3 Capsules by mouth at bedtime as needed (tingling /numbness in feet).  Dispense: 90 Capsule; Refill: 2  - VITAMIN B12; Future    5. Uncontrolled type 2 diabetes mellitus with hyperglycemia (HCC)  Sent Jardiance again.  Recommended to give us a call if she does not receive medication.  - Empagliflozin 25 MG Tab; Take 25 mg by mouth every day.  Dispense: 90 Tablet; Refill: 3  - Comp Metabolic Panel; Future  - Lipid Profile; Future  - HEMOGLOBIN A1C; Future  - MICROALBUMIN CREAT RATIO URINE; Future    6. Dyslipidemia  7. History of ischemic stroke  Refilled rosuvastatin (CRESTOR) 5 MG Tab; Take 1 Tablet by mouth every evening.  Dispense: 90 Tablet; Refill: 3    8. Need for hepatitis C screening test  - HCV Scrn ( [id number] 1xLife); Future        Return in about 2 months (around 2022).      Signed by: Mena Christensen M.D.

## 2022-09-06 ENCOUNTER — HOSPITAL ENCOUNTER (OUTPATIENT)
Dept: LAB | Facility: MEDICAL CENTER | Age: 67
End: 2022-09-06
Attending: INTERNAL MEDICINE
Payer: COMMERCIAL

## 2022-09-06 DIAGNOSIS — Z11.59 NEED FOR HEPATITIS C SCREENING TEST: ICD-10-CM

## 2022-09-06 DIAGNOSIS — E11.65 UNCONTROLLED TYPE 2 DIABETES MELLITUS WITH HYPERGLYCEMIA (HCC): ICD-10-CM

## 2022-09-06 DIAGNOSIS — E11.42 DIABETIC POLYNEUROPATHY ASSOCIATED WITH TYPE 2 DIABETES MELLITUS (HCC): ICD-10-CM

## 2022-09-06 LAB
ALBUMIN SERPL BCP-MCNC: 4.4 G/DL (ref 3.2–4.9)
ALBUMIN/GLOB SERPL: 1.4 G/DL
ALP SERPL-CCNC: 75 U/L (ref 30–99)
ALT SERPL-CCNC: 7 U/L (ref 2–50)
ANION GAP SERPL CALC-SCNC: 12 MMOL/L (ref 7–16)
AST SERPL-CCNC: 15 U/L (ref 12–45)
BILIRUB SERPL-MCNC: 0.3 MG/DL (ref 0.1–1.5)
BUN SERPL-MCNC: 18 MG/DL (ref 8–22)
CALCIUM SERPL-MCNC: 9.7 MG/DL (ref 8.5–10.5)
CHLORIDE SERPL-SCNC: 106 MMOL/L (ref 96–112)
CHOLEST SERPL-MCNC: 140 MG/DL (ref 100–199)
CO2 SERPL-SCNC: 24 MMOL/L (ref 20–33)
CREAT SERPL-MCNC: 0.79 MG/DL (ref 0.5–1.4)
CREAT UR-MCNC: 70.48 MG/DL
EST. AVERAGE GLUCOSE BLD GHB EST-MCNC: 194 MG/DL
GFR SERPLBLD CREATININE-BSD FMLA CKD-EPI: 82 ML/MIN/1.73 M 2
GLOBULIN SER CALC-MCNC: 3.1 G/DL (ref 1.9–3.5)
GLUCOSE SERPL-MCNC: 148 MG/DL (ref 65–99)
HBA1C MFR BLD: 8.4 % (ref 4–5.6)
HCV AB SER QL: NORMAL
HDLC SERPL-MCNC: 44 MG/DL
LDLC SERPL CALC-MCNC: 77 MG/DL
MICROALBUMIN UR-MCNC: 1.9 MG/DL
MICROALBUMIN/CREAT UR: 27 MG/G (ref 0–30)
POTASSIUM SERPL-SCNC: 4.4 MMOL/L (ref 3.6–5.5)
PROT SERPL-MCNC: 7.5 G/DL (ref 6–8.2)
SODIUM SERPL-SCNC: 142 MMOL/L (ref 135–145)
TRIGL SERPL-MCNC: 93 MG/DL (ref 0–149)
VIT B12 SERPL-MCNC: 625 PG/ML (ref 211–911)

## 2022-09-06 PROCEDURE — G0472 HEP C SCREEN HIGH RISK/OTHER: HCPCS

## 2022-09-06 PROCEDURE — 82570 ASSAY OF URINE CREATININE: CPT

## 2022-09-06 PROCEDURE — 80053 COMPREHEN METABOLIC PANEL: CPT

## 2022-09-06 PROCEDURE — 83036 HEMOGLOBIN GLYCOSYLATED A1C: CPT

## 2022-09-06 PROCEDURE — 80061 LIPID PANEL: CPT

## 2022-09-06 PROCEDURE — 82043 UR ALBUMIN QUANTITATIVE: CPT

## 2022-09-06 PROCEDURE — 36415 COLL VENOUS BLD VENIPUNCTURE: CPT

## 2022-09-06 PROCEDURE — 82607 VITAMIN B-12: CPT

## 2022-09-07 ENCOUNTER — OFFICE VISIT (OUTPATIENT)
Dept: MEDICAL GROUP | Facility: MEDICAL CENTER | Age: 67
End: 2022-09-07
Payer: COMMERCIAL

## 2022-09-07 VITALS
HEIGHT: 63 IN | SYSTOLIC BLOOD PRESSURE: 134 MMHG | HEART RATE: 94 BPM | TEMPERATURE: 97.3 F | BODY MASS INDEX: 30.75 KG/M2 | OXYGEN SATURATION: 97 % | DIASTOLIC BLOOD PRESSURE: 74 MMHG

## 2022-09-07 DIAGNOSIS — G89.29 CHRONIC MIDLINE LOW BACK PAIN WITH BILATERAL SCIATICA: ICD-10-CM

## 2022-09-07 DIAGNOSIS — Z12.31 ENCOUNTER FOR SCREENING MAMMOGRAM FOR BREAST CANCER: ICD-10-CM

## 2022-09-07 DIAGNOSIS — R79.89 LOW VITAMIN D LEVEL: ICD-10-CM

## 2022-09-07 DIAGNOSIS — M54.41 CHRONIC MIDLINE LOW BACK PAIN WITH BILATERAL SCIATICA: ICD-10-CM

## 2022-09-07 DIAGNOSIS — M54.42 CHRONIC MIDLINE LOW BACK PAIN WITH BILATERAL SCIATICA: ICD-10-CM

## 2022-09-07 DIAGNOSIS — E11.65 UNCONTROLLED TYPE 2 DIABETES MELLITUS WITH HYPERGLYCEMIA (HCC): ICD-10-CM

## 2022-09-07 DIAGNOSIS — R80.9 MICROALBUMINURIA: ICD-10-CM

## 2022-09-07 PROCEDURE — 99214 OFFICE O/P EST MOD 30 MIN: CPT | Performed by: INTERNAL MEDICINE

## 2022-09-07 RX ORDER — METHOCARBAMOL 750 MG/1
750 TABLET, FILM COATED ORAL
Qty: 30 TABLET | Refills: 2 | Status: SHIPPED | OUTPATIENT
Start: 2022-09-07 | End: 2023-04-04

## 2022-09-08 NOTE — PROGRESS NOTES
Established Patient    Domonique presents today with the following:    CC: Follow-up for chronic medical problems, back pain, dizziness    HPI:   Domonique is a 67 y.o. female who came in for above.    She has been feeling dizzy for the past 3 days.  Slightly off balance.  No room spinning or presyncope.  She has been hydrating well.  There is slight right ear discomfort.    She has not been able to sleep well due to lower back discomfort, worsening with lying down.  There is numbness progressively going up to her thigh.        ROS:   As above    Patient Active Problem List    Diagnosis Date Noted    Postmenopause 11/20/2020    History of ischemic stroke 09/15/2020    Low vitamin D level 09/15/2020    Diabetic polyneuropathy associated with type 2 diabetes mellitus (HCC) 08/04/2020    Oliguria 08/04/2020    Onychomycosis 08/04/2020    Intertrigo 08/04/2020    Other hemorrhoids 08/04/2020    Postural dizziness with presyncope 08/04/2020    Fall from ground level 08/04/2020    Other fatigue 08/04/2020    Snoring 08/04/2020    Hair loss 08/04/2020    Chronic constipation 08/04/2020    Chronic left hip pain 10/19/2015    Fibroid, uterine 06/12/2015    Hyperlipidemia with target LDL less than 70 06/12/2015    GERD (gastroesophageal reflux disease) 04/16/2015    Varicose veins 04/13/2015    Type II diabetes mellitus, uncontrolled (HCC) 04/13/2015       Current Outpatient Medications   Medication Sig Dispense Refill    methocarbamol (ROBAXIN) 750 MG Tab Take 1 Tablet by mouth at bedtime. 30 Tablet 2    gabapentin (NEURONTIN) 100 MG Cap Take 1-3 Capsules by mouth at bedtime as needed (tingling /numbness in feet). 90 Capsule 2    Empagliflozin 25 MG Tab Take 25 mg by mouth every day. 90 Tablet 3    rosuvastatin (CRESTOR) 5 MG Tab Take 1 Tablet by mouth every evening. 90 Tablet 3    terbinafine (LAMISIL) 250 MG Tab Take 1 Tablet by mouth every day. 90 Tablet 0    omeprazole (PRILOSEC) 20 MG delayed-release capsule Take 1  "Capsule by mouth every day. 90 Capsule 3    insulin detemir (LEVEMIR FLEXTOUCH) 100 UNIT/ML injection PEN Inject 25 Units under the skin every morning. 5 Each 2    triamcinolone acetonide (KENALOG) 0.1 % Cream Apply a thin layer on skin rashes twice daily x 1 week as needed for flares 45 g 3    aspirin (ASA) 81 MG Chew Tab chewable tablet Chew 1 Tablet every day. 90 Tablet 3    metFORMIN (GLUCOPHAGE) 500 MG Tab Take 1 Tablet by mouth 2 times a day with meals. 180 Tablet 3    Insulin Pen Needle (PEN NEEDLES) 32G X 4 MM Misc 1 Each every day. 100 Each 3    Lancets Lancets order: Lancets for Accucheck Guide meter. Sig: use once daily and prn ssx high or low sugar. #100 RF x 3 100 Each 3    glucose blood (ACCU-CHEK GUIDE) strip USE TWICE DAILY AND AS NEEDED FOR HIGH OR LOW BLOOD SUGAR 100 Strip 3    Blood Glucose Test Strips Test strips order: Test strips for Accucheck guide meter. Sig: use twice daily and prn ssx high or low sugar #100 RF x 3 (may exchange from brand strip compatible with her machine) 100 Each 3    miconazole (MICOTIN) 2 % Cream Apply 1 Application topically 2 times a day. Apply on feet rash and groin rash 113 g 1    ibuprofen (MOTRIN) 200 MG Tab Take 400 mg by mouth every 6 hours as needed for Mild Pain.       No current facility-administered medications for this visit.         /74   Pulse 94   Temp 36.3 °C (97.3 °F) (Temporal)   Ht 1.6 m (5' 3\")   SpO2 97%   BMI 30.75 kg/m²     Physical Exam  General: Alert and oriented, No apparent distress.  Right TM showed slight haziness with minimal effusion.  Left TM is clear.  Nasal turbinate edema present.  Throat: Clear no erythema or exudates noted.  Neck: Supple. No cervical or supraclavicular lymphadenopathy noted. Thyroid not enlarged.  Lungs: Clear to auscultation bilaterally without any wheezing, crepitations.  Cardiovascular: Regular rate and rhythm. No murmurs, rubs or gallops.  Extremities: No edema.         Assessment and Plan    1. " Chronic midline low back pain with bilateral sciatica  Paraspinal muscle tension on exam.  Try muscle relaxer for pain relief and sleep at night.  Due to progressive numbness, recommended MRI of lumbar spine.  - methocarbamol (ROBAXIN) 750 MG Tab; Take 1 Tablet by mouth at bedtime.  Dispense: 30 Tablet; Refill: 2  - MR-LUMBAR SPINE-W/O; Future    2. Uncontrolled type 2 diabetes mellitus with hyperglycemia (HCC)  Improving but not at target.  Discussed to continue the current regimen for 3 more months since it is improving vs adding new medication at this time.  She would like to continue current medications for now and see if it continues to improve next time.  - Comp Metabolic Panel; Future  - Lipid Profile; Future  - HEMOGLOBIN A1C; Future  - MICROALBUMIN CREAT RATIO URINE; Future    3. Microalbuminuria  - improved to normal.    4. Low vitamin D level  - VITAMIN D,25 HYDROXY (DEFICIENCY); Future    5. Encounter for screening mammogram for breast cancer  - MA-SCREENING MAMMO BILAT W/TOMOSYNTHESIS W/CAD; Future        Return in about 3 months (around 12/7/2022).      Signed by: Mena Christensen M.D.

## 2022-10-24 DIAGNOSIS — E11.65 UNCONTROLLED TYPE 2 DIABETES MELLITUS WITH HYPERGLYCEMIA (HCC): ICD-10-CM

## 2022-10-25 RX ORDER — BLOOD SUGAR DIAGNOSTIC
STRIP MISCELLANEOUS
Qty: 100 STRIP | Refills: 3 | Status: SHIPPED | OUTPATIENT
Start: 2022-10-25 | End: 2023-05-09

## 2022-11-07 ENCOUNTER — HOSPITAL ENCOUNTER (OUTPATIENT)
Dept: RADIOLOGY | Facility: MEDICAL CENTER | Age: 67
End: 2022-11-07
Attending: INTERNAL MEDICINE
Payer: COMMERCIAL

## 2022-11-07 DIAGNOSIS — Z12.31 ENCOUNTER FOR SCREENING MAMMOGRAM FOR BREAST CANCER: ICD-10-CM

## 2022-11-07 PROCEDURE — 77063 BREAST TOMOSYNTHESIS BI: CPT

## 2022-11-11 ENCOUNTER — TELEPHONE (OUTPATIENT)
Dept: MEDICAL GROUP | Facility: MEDICAL CENTER | Age: 67
End: 2022-11-11
Payer: COMMERCIAL

## 2022-11-11 NOTE — TELEPHONE ENCOUNTER
"----- Message from Mena Christensen M.D. sent at 11/8/2022 12:00 PM PST -----  Please let the patient know.  \"Your mammogram result did not show any suspicious lesion for cancer. We will follow up with regular yearly mammogram.\"   Dr Christensen      "

## 2022-12-06 ENCOUNTER — HOSPITAL ENCOUNTER (OUTPATIENT)
Facility: MEDICAL CENTER | Age: 67
End: 2022-12-06
Attending: INTERNAL MEDICINE
Payer: COMMERCIAL

## 2022-12-06 ENCOUNTER — OFFICE VISIT (OUTPATIENT)
Dept: MEDICAL GROUP | Facility: MEDICAL CENTER | Age: 67
End: 2022-12-06
Payer: COMMERCIAL

## 2022-12-06 VITALS
WEIGHT: 170 LBS | BODY MASS INDEX: 29.02 KG/M2 | HEART RATE: 88 BPM | DIASTOLIC BLOOD PRESSURE: 98 MMHG | RESPIRATION RATE: 16 BRPM | OXYGEN SATURATION: 97 % | TEMPERATURE: 97.6 F | SYSTOLIC BLOOD PRESSURE: 166 MMHG | HEIGHT: 64 IN

## 2022-12-06 DIAGNOSIS — Z86.73 HISTORY OF ISCHEMIC STROKE: ICD-10-CM

## 2022-12-06 DIAGNOSIS — L03.031 ACUTE PARONYCHIA OF TOE, RIGHT: ICD-10-CM

## 2022-12-06 DIAGNOSIS — Z12.4 PAP SMEAR FOR CERVICAL CANCER SCREENING: ICD-10-CM

## 2022-12-06 DIAGNOSIS — E11.65 UNCONTROLLED TYPE 2 DIABETES MELLITUS WITH HYPERGLYCEMIA (HCC): ICD-10-CM

## 2022-12-06 DIAGNOSIS — Z01.419 WELL WOMAN EXAM: ICD-10-CM

## 2022-12-06 DIAGNOSIS — E78.5 DYSLIPIDEMIA: ICD-10-CM

## 2022-12-06 DIAGNOSIS — N95.2 ATROPHIC VAGINITIS: ICD-10-CM

## 2022-12-06 PROCEDURE — 88175 CYTOPATH C/V AUTO FLUID REDO: CPT

## 2022-12-06 PROCEDURE — 99397 PER PM REEVAL EST PAT 65+ YR: CPT | Performed by: INTERNAL MEDICINE

## 2022-12-06 PROCEDURE — 87624 HPV HI-RISK TYP POOLED RSLT: CPT

## 2022-12-06 RX ORDER — ESTRADIOL 0.1 MG/G
CREAM VAGINAL
Qty: 42.5 G | Refills: 3 | Status: SHIPPED | OUTPATIENT
Start: 2022-12-06 | End: 2023-04-04

## 2022-12-06 RX ORDER — ROSUVASTATIN CALCIUM 5 MG/1
5 TABLET, COATED ORAL EVERY EVENING
Qty: 90 TABLET | Refills: 3 | Status: SHIPPED | OUTPATIENT
Start: 2022-12-06 | End: 2024-01-16 | Stop reason: SDUPTHER

## 2022-12-06 RX ORDER — AMOXICILLIN AND CLAVULANATE POTASSIUM 875; 125 MG/1; MG/1
1 TABLET, FILM COATED ORAL 2 TIMES DAILY
Qty: 10 TABLET | Refills: 0 | Status: SHIPPED | OUTPATIENT
Start: 2022-12-06 | End: 2022-12-11

## 2022-12-06 RX ORDER — ASPIRIN 81 MG/1
81 TABLET, CHEWABLE ORAL DAILY
Qty: 90 TABLET | Refills: 3 | Status: SHIPPED | OUTPATIENT
Start: 2022-12-06 | End: 2023-11-02

## 2022-12-06 RX ORDER — PANTOPRAZOLE SODIUM 40 MG/1
TABLET, DELAYED RELEASE ORAL
COMMUNITY
Start: 2022-11-24 | End: 2023-04-04

## 2022-12-06 RX ORDER — INSULIN DETEMIR 100 [IU]/ML
25 INJECTION, SOLUTION SUBCUTANEOUS EVERY MORNING
Qty: 5 EACH | Refills: 2 | Status: SHIPPED | OUTPATIENT
Start: 2022-12-06 | End: 2023-03-28

## 2022-12-06 ASSESSMENT — FIBROSIS 4 INDEX: FIB4 SCORE: 1.31

## 2022-12-07 DIAGNOSIS — Z01.419 WELL WOMAN EXAM: ICD-10-CM

## 2022-12-07 DIAGNOSIS — Z12.4 PAP SMEAR FOR CERVICAL CANCER SCREENING: ICD-10-CM

## 2022-12-07 NOTE — PROGRESS NOTES
Subjective:     CC:   Chief Complaint   Patient presents with    Gynecologic Exam       HPI:   Domonique Galeano is a 67 y.o. female who presents for annual exam    Patient has GYN provider: No   Last Pap Smear: > 5 years ago, no regular gyn care.   H/O Abnormal Pap: No  Last Mammogram: 2022  Last Bone Density Test: , normal     Received HPV series: Aged out     Menopausal. C/o clear discharge, slight burning. No breast lump or nipple discharge.     OB History    Para Term  AB Living   2 2 0 0 0 0   SAB IAB Ectopic Molar Multiple Live Births   0 0 0 0 0 0      She  reports being sexually active and has had partner(s) who are male.    She  has a past medical history of Diabetic peripheral neuropathy (HCC) (2020), Hyperlipidemia LDL goal < 70 (2015), and Type II or unspecified type diabetes mellitus without mention of complication, uncontrolled (2015).    She has no past medical history of Arrhythmia, Asthma, CHF (congestive heart failure) (HCC), Chronic airway obstruction, not elsewhere classified, Emphysema, Heart attack (HCC), Heart murmur, Hypertension, Seizure (HCC), Stroke (HCC), or Thyroid disease.  She  has a past surgical history that includes appendectomy.    Family History   Problem Relation Age of Onset    Diabetes Mother     Diabetes Sister     Diabetes Maternal Grandmother      Social History     Tobacco Use    Smoking status: Never    Smokeless tobacco: Never   Vaping Use    Vaping Use: Never used   Substance Use Topics    Alcohol use: No    Drug use: No       Patient Active Problem List    Diagnosis Date Noted    Postmenopause 2020    History of ischemic stroke 09/15/2020    Low vitamin D level 09/15/2020    Diabetic polyneuropathy associated with type 2 diabetes mellitus (HCC) 2020    Oliguria 2020    Onychomycosis 2020    Intertrigo 2020    Other hemorrhoids 2020    Postural dizziness with presyncope 2020    Fall  from ground level 08/04/2020    Other fatigue 08/04/2020    Snoring 08/04/2020    Hair loss 08/04/2020    Chronic constipation 08/04/2020    Chronic left hip pain 10/19/2015    Fibroid, uterine 06/12/2015    Hyperlipidemia with target LDL less than 70 06/12/2015    GERD (gastroesophageal reflux disease) 04/16/2015    Varicose veins 04/13/2015    Type II diabetes mellitus, uncontrolled 04/13/2015     Current Outpatient Medications   Medication Sig Dispense Refill    amoxicillin-clavulanate (AUGMENTIN) 875-125 MG Tab Take 1 Tablet by mouth 2 times a day for 5 days. 10 Tablet 0    estradiol (ESTRACE) 0.1 MG/GM vaginal cream Apply intravaginally. 1 g daily x 2 week, then 0.5 g twice a week. 42.5 g 3    Empagliflozin 25 MG Tab Take 25 mg by mouth every day. 90 Tablet 3    insulin detemir (LEVEMIR FLEXTOUCH) 100 UNIT/ML injection PEN Inject 25 Units under the skin every morning. 5 Each 2    metFORMIN (GLUCOPHAGE) 500 MG Tab Take 1 Tablet by mouth 2 times a day with meals. 180 Tablet 3    rosuvastatin (CRESTOR) 5 MG Tab Take 1 Tablet by mouth every evening. 90 Tablet 3    aspirin (ASA) 81 MG Chew Tab chewable tablet Chew 1 Tablet every day. 90 Tablet 3    pantoprazole (PROTONIX) 40 MG Tablet Delayed Response TAKE 1 TABLET BY MOUTH ONCE A DAY 30 MINUTES BEFORE BREAKFAST MEAL      ACCU-CHEK GUIDE strip USE TWICE DAILY AND AS NEEDED FOR HIGH OR LOW BLOOD SUGAR 100 Strip 3    methocarbamol (ROBAXIN) 750 MG Tab Take 1 Tablet by mouth at bedtime. 30 Tablet 2    gabapentin (NEURONTIN) 100 MG Cap Take 1-3 Capsules by mouth at bedtime as needed (tingling /numbness in feet). 90 Capsule 2    omeprazole (PRILOSEC) 20 MG delayed-release capsule Take 1 Capsule by mouth every day. 90 Capsule 3    triamcinolone acetonide (KENALOG) 0.1 % Cream Apply a thin layer on skin rashes twice daily x 1 week as needed for flares 45 g 3    Insulin Pen Needle (PEN NEEDLES) 32G X 4 MM Misc 1 Each every day. 100 Each 3    Lancets Lancets order: Lancets  "for Accucheck Guide meter. Sig: use once daily and prn ssx high or low sugar. #100 RF x 3 100 Each 3    Blood Glucose Test Strips Test strips order: Test strips for Accucheck guide meter. Sig: use twice daily and prn ssx high or low sugar #100 RF x 3 (may exchange from brand strip compatible with her machine) 100 Each 3    miconazole (MICOTIN) 2 % Cream Apply 1 Application topically 2 times a day. Apply on feet rash and groin rash 113 g 1    ibuprofen (MOTRIN) 200 MG Tab Take 400 mg by mouth every 6 hours as needed for Mild Pain.       No current facility-administered medications for this visit.     No Known Allergies         Objective:   BP (!) 166/98 (BP Location: Left arm, Patient Position: Sitting, BP Cuff Size: Adult)   Pulse 88   Temp 36.4 °C (97.6 °F) (Temporal)   Resp 16   Ht 1.626 m (5' 4\")   Wt 77.1 kg (170 lb)   SpO2 97%   BMI 29.18 kg/m²     Wt Readings from Last 4 Encounters:   12/06/22 77.1 kg (170 lb)   06/24/22 78.7 kg (173 lb 9.6 oz)   05/27/22 78.4 kg (172 lb 12.8 oz)   04/08/22 78 kg (172 lb)          Physical Exam:      Breast: Breasts examined seated and supine. No skin changes, peau d'orange or nipple retraction. No discharge. No axillary or supraclavicular adenopathy. No masses or nodularity palpable.   : Perineum and external genitalia normal without rash. Slight red patch on right labia. Vagina with scant discharge. Cervix without visible lesions or discharge.  Bimanual exam without adnexal masses or cervical motion tenderness.  Monofilament testing with a 10 gram force: sensation intact: decreased bilaterally  Visual Inspection: Feet without maceration, ulcers, fissures. Improved onychomycosis, limited to left big toenail as opposed to several toenails on both sides previously  Pedal pulses: intact bilaterally        Assessment and Plan:     1. Well woman exam  - THINPREP PAP WITH HPV; Future    2. Acute paronychia of toe, right  Drained pus with needle. Use topical and oral Abx. " Check feet daily. Come back with any progression.  - amoxicillin-clavulanate (AUGMENTIN) 875-125 MG Tab; Take 1 Tablet by mouth 2 times a day for 5 days.  Dispense: 10 Tablet; Refill: 0    3. Pap smear for cervical cancer screening  - THINPREP PAP WITH HPV; Future    4. Atrophic vaginitis  - try estradiol (ESTRACE) 0.1 MG/GM vaginal cream; Apply intravaginally. 1 g daily x 2 week, then 0.5 g twice a week.  Dispense: 42.5 g; Refill: 3    5. Uncontrolled type 2 diabetes mellitus with hyperglycemia (HCC)  - Empagliflozin 25 MG Tab; Take 25 mg by mouth every day.  Dispense: 90 Tablet; Refill: 3  - insulin detemir (LEVEMIR FLEXTOUCH) 100 UNIT/ML injection PEN; Inject 25 Units under the skin every morning.  Dispense: 5 Each; Refill: 2  - metFORMIN (GLUCOPHAGE) 500 MG Tab; Take 1 Tablet by mouth 2 times a day with meals.  Dispense: 180 Tablet; Refill: 3    6. Dyslipidemia  - rosuvastatin (CRESTOR) 5 MG Tab; Take 1 Tablet by mouth every evening.  Dispense: 90 Tablet; Refill: 3    7. History of ischemic stroke  - rosuvastatin (CRESTOR) 5 MG Tab; Take 1 Tablet by mouth every evening.  Dispense: 90 Tablet; Refill: 3  - aspirin (ASA) 81 MG Chew Tab chewable tablet; Chew 1 Tablet every day.  Dispense: 90 Tablet; Refill: 3           Health maintenance:      Labs per orders     Discussed  atrophic vaginitis, regular breast exam, mammogram.     Follow-up: Return in about 3 months (around 3/6/2023).

## 2022-12-08 LAB
CYTOLOGY REG CYTOL: NORMAL
HPV HR 12 DNA CVX QL NAA+PROBE: NEGATIVE
HPV16 DNA SPEC QL NAA+PROBE: NEGATIVE
HPV18 DNA SPEC QL NAA+PROBE: NEGATIVE
SPECIMEN SOURCE: NORMAL

## 2022-12-12 ENCOUNTER — TELEPHONE (OUTPATIENT)
Dept: MEDICAL GROUP | Facility: MEDICAL CENTER | Age: 67
End: 2022-12-12
Payer: COMMERCIAL

## 2022-12-12 NOTE — TELEPHONE ENCOUNTER
----- Message from Mena Christensen M.D. sent at 12/9/2022  5:26 PM PST -----  Please inform patient that Pap smear came back normal.  She no longer needs Pap smear in the future.   Topical Retinoid counseling:  Patient advised to apply a pea-sized amount only at bedtime and wait 30 minutes after washing their face before applying.  If too drying, patient may add a non-comedogenic moisturizer. The patient verbalized understanding of the proper use and possible adverse effects of retinoids.  All of the patient's questions and concerns were addressed. Doxycycline Counseling:  Patient counseled regarding possible photosensitivity and increased risk for sunburn.  Patient instructed to avoid sunlight, if possible.  When exposed to sunlight, patients should wear protective clothing, sunglasses, and sunscreen.  The patient was instructed to call the office immediately if the following severe adverse effects occur:  hearing changes, easy bruising/bleeding, severe headache, or vision changes.  The patient verbalized understanding of the proper use and possible adverse effects of doxycycline.  All of the patient's questions and concerns were addressed. Birth Control Pills Counseling: Birth Control Pill Counseling: I discussed with the patient the potential side effects of OCPs including but not limited to increased risk of stroke, heart attack, thrombophlebitis, deep venous thrombosis, hepatic adenomas, breast changes, GI upset, headaches, and depression.  The patient verbalized understanding of the proper use and possible adverse effects of OCPs. All of the patient's questions and concerns were addressed. Minocycline Pregnancy And Lactation Text: This medication is Pregnancy Category D and not consider safe during pregnancy. It is also excreted in breast milk. Isotretinoin Counseling: Patient should get monthly blood tests, not donate blood, not drive at night if vision affected, not share medication, and not undergo elective surgery for 6 months after tx completed. Side effects reviewed, pt to contact office should one occur. Topical Clindamycin Counseling: Patient counseled that this medication may cause skin irritation or allergic reactions.  In the event of skin irritation, the patient was advised to reduce the amount of the drug applied or use it less frequently.   The patient verbalized understanding of the proper use and possible adverse effects of clindamycin.  All of the patient's questions and concerns were addressed. Bactrim Pregnancy And Lactation Text: This medication is Pregnancy Category D and is known to cause fetal risk.  It is also excreted in breast milk. Spironolactone Pregnancy And Lactation Text: This medication can cause feminization of the male fetus and should be avoided during pregnancy. The active metabolite is also found in breast milk. Birth Control Pills Pregnancy And Lactation Text: This medication should be avoided if pregnant and for the first 30 days post-partum. Sarecycline Counseling: Patient advised regarding possible photosensitivity and discoloration of the teeth, skin, lips, tongue and gums.  Patient instructed to avoid sunlight, if possible.  When exposed to sunlight, patients should wear protective clothing, sunglasses, and sunscreen.  The patient was instructed to call the office immediately if the following severe adverse effects occur:  hearing changes, easy bruising/bleeding, severe headache, or vision changes.  The patient verbalized understanding of the proper use and possible adverse effects of sarecycline.  All of the patient's questions and concerns were addressed. Azithromycin Pregnancy And Lactation Text: This medication is considered safe during pregnancy and is also secreted in breast milk. High Dose Vitamin A Counseling: Side effects reviewed, pt to contact office should one occur. Topical Retinoid Pregnancy And Lactation Text: This medication is Pregnancy Category C. It is unknown if this medication is excreted in breast milk. Azithromycin Counseling:  I discussed with the patient the risks of azithromycin including but not limited to GI upset, allergic reaction, drug rash, diarrhea, and yeast infections. Use Enhanced Medication Counseling?: No Isotretinoin Pregnancy And Lactation Text: This medication is Pregnancy Category X and is considered extremely dangerous during pregnancy. It is unknown if it is excreted in breast milk. Doxycycline Pregnancy And Lactation Text: This medication is Pregnancy Category D and not consider safe during pregnancy. It is also excreted in breast milk but is considered safe for shorter treatment courses. Topical Clindamycin Pregnancy And Lactation Text: This medication is Pregnancy Category B and is considered safe during pregnancy. It is unknown if it is excreted in breast milk. Tetracycline Counseling: Patient counseled regarding possible photosensitivity and increased risk for sunburn.  Patient instructed to avoid sunlight, if possible.  When exposed to sunlight, patients should wear protective clothing, sunglasses, and sunscreen.  The patient was instructed to call the office immediately if the following severe adverse effects occur:  hearing changes, easy bruising/bleeding, severe headache, or vision changes.  The patient verbalized understanding of the proper use and possible adverse effects of tetracycline.  All of the patient's questions and concerns were addressed. Patient understands to avoid pregnancy while on therapy due to potential birth defects. Detail Level: Zone High Dose Vitamin A Pregnancy And Lactation Text: High dose vitamin A therapy is contraindicated during pregnancy and breast feeding. Benzoyl Peroxide Counseling: Patient counseled that medicine may cause skin irritation and bleach clothing.  In the event of skin irritation, the patient was advised to reduce the amount of the drug applied or use it less frequently.   The patient verbalized understanding of the proper use and possible adverse effects of benzoyl peroxide.  All of the patient's questions and concerns were addressed. Tazorac Counseling:  Patient advised that medication is irritating and drying.  Patient may need to apply sparingly and wash off after an hour before eventually leaving it on overnight.  The patient verbalized understanding of the proper use and possible adverse effects of tazorac.  All of the patient's questions and concerns were addressed. Erythromycin Counseling:  I discussed with the patient the risks of erythromycin including but not limited to GI upset, allergic reaction, drug rash, diarrhea, increase in liver enzymes, and yeast infections. Topical Sulfur Applications Counseling: Topical Sulfur Counseling: Patient counseled that this medication may cause skin irritation or allergic reactions.  In the event of skin irritation, the patient was advised to reduce the amount of the drug applied or use it less frequently.   The patient verbalized understanding of the proper use and possible adverse effects of topical sulfur application.  All of the patient's questions and concerns were addressed. Dapsone Counseling: I discussed with the patient the risks of dapsone including but not limited to hemolytic anemia, agranulocytosis, rashes, methemoglobinemia, kidney failure, peripheral neuropathy, headaches, GI upset, and liver toxicity.  Patients who start dapsone require monitoring including baseline LFTs and weekly CBCs for the first month, then every month thereafter.  The patient verbalized understanding of the proper use and possible adverse effects of dapsone.  All of the patient's questions and concerns were addressed. Dapsone Pregnancy And Lactation Text: This medication is Pregnancy Category C and is not considered safe during pregnancy or breast feeding. Minocycline Counseling: Patient advised regarding possible photosensitivity and discoloration of the teeth, skin, lips, tongue and gums.  Patient instructed to avoid sunlight, if possible.  When exposed to sunlight, patients should wear protective clothing, sunglasses, and sunscreen.  The patient was instructed to call the office immediately if the following severe adverse effects occur:  hearing changes, easy bruising/bleeding, severe headache, or vision changes.  The patient verbalized understanding of the proper use and possible adverse effects of minocycline.  All of the patient's questions and concerns were addressed. Benzoyl Peroxide Pregnancy And Lactation Text: This medication is Pregnancy Category C. It is unknown if benzoyl peroxide is excreted in breast milk. Bactrim Counseling:  I discussed with the patient the risks of sulfa antibiotics including but not limited to GI upset, allergic reaction, drug rash, diarrhea, dizziness, photosensitivity, and yeast infections.  Rarely, more serious reactions can occur including but not limited to aplastic anemia, agranulocytosis, methemoglobinemia, blood dyscrasias, liver or kidney failure, lung infiltrates or desquamative/blistering drug rashes. Tazorac Pregnancy And Lactation Text: This medication is not safe during pregnancy. It is unknown if this medication is excreted in breast milk. Erythromycin Pregnancy And Lactation Text: This medication is Pregnancy Category B and is considered safe during pregnancy. It is also excreted in breast milk. Topical Sulfur Applications Pregnancy And Lactation Text: This medication is Pregnancy Category C and has an unknown safety profile during pregnancy. It is unknown if this topical medication is excreted in breast milk. Spironolactone Counseling: Patient advised regarding risks of diarrhea, abdominal pain, hyperkalemia, birth defects (for female patients), liver toxicity and renal toxicity. The patient may need blood work to monitor liver and kidney function and potassium levels while on therapy. The patient verbalized understanding of the proper use and possible adverse effects of spironolactone.  All of the patient's questions and concerns were addressed.

## 2023-03-06 ENCOUNTER — HOSPITAL ENCOUNTER (OUTPATIENT)
Dept: LAB | Facility: MEDICAL CENTER | Age: 68
End: 2023-03-06
Attending: INTERNAL MEDICINE
Payer: COMMERCIAL

## 2023-03-06 DIAGNOSIS — R79.89 LOW VITAMIN D LEVEL: ICD-10-CM

## 2023-03-06 DIAGNOSIS — E11.65 UNCONTROLLED TYPE 2 DIABETES MELLITUS WITH HYPERGLYCEMIA (HCC): ICD-10-CM

## 2023-03-06 LAB
25(OH)D3 SERPL-MCNC: 23 NG/ML (ref 30–100)
ALBUMIN SERPL BCP-MCNC: 4.6 G/DL (ref 3.2–4.9)
ALBUMIN/GLOB SERPL: 1.4 G/DL
ALP SERPL-CCNC: 75 U/L (ref 30–99)
ALT SERPL-CCNC: 12 U/L (ref 2–50)
ANION GAP SERPL CALC-SCNC: 14 MMOL/L (ref 7–16)
AST SERPL-CCNC: 15 U/L (ref 12–45)
BILIRUB SERPL-MCNC: 0.2 MG/DL (ref 0.1–1.5)
BUN SERPL-MCNC: 19 MG/DL (ref 8–22)
CALCIUM ALBUM COR SERPL-MCNC: 9.3 MG/DL (ref 8.5–10.5)
CALCIUM SERPL-MCNC: 9.8 MG/DL (ref 8.5–10.5)
CHLORIDE SERPL-SCNC: 106 MMOL/L (ref 96–112)
CHOLEST SERPL-MCNC: 150 MG/DL (ref 100–199)
CO2 SERPL-SCNC: 22 MMOL/L (ref 20–33)
CREAT SERPL-MCNC: 0.54 MG/DL (ref 0.5–1.4)
CREAT UR-MCNC: 50.51 MG/DL
EST. AVERAGE GLUCOSE BLD GHB EST-MCNC: 197 MG/DL
FASTING STATUS PATIENT QL REPORTED: NORMAL
GFR SERPLBLD CREATININE-BSD FMLA CKD-EPI: 101 ML/MIN/1.73 M 2
GLOBULIN SER CALC-MCNC: 3.2 G/DL (ref 1.9–3.5)
GLUCOSE SERPL-MCNC: 140 MG/DL (ref 65–99)
HBA1C MFR BLD: 8.5 % (ref 4–5.6)
HDLC SERPL-MCNC: 48 MG/DL
LDLC SERPL CALC-MCNC: 85 MG/DL
MICROALBUMIN UR-MCNC: 2.1 MG/DL
MICROALBUMIN/CREAT UR: 42 MG/G (ref 0–30)
POTASSIUM SERPL-SCNC: 4.7 MMOL/L (ref 3.6–5.5)
PROT SERPL-MCNC: 7.8 G/DL (ref 6–8.2)
SODIUM SERPL-SCNC: 142 MMOL/L (ref 135–145)
TRIGL SERPL-MCNC: 84 MG/DL (ref 0–149)

## 2023-03-06 PROCEDURE — 82306 VITAMIN D 25 HYDROXY: CPT

## 2023-03-06 PROCEDURE — 80053 COMPREHEN METABOLIC PANEL: CPT

## 2023-03-06 PROCEDURE — 83036 HEMOGLOBIN GLYCOSYLATED A1C: CPT

## 2023-03-06 PROCEDURE — 82570 ASSAY OF URINE CREATININE: CPT

## 2023-03-06 PROCEDURE — 82043 UR ALBUMIN QUANTITATIVE: CPT

## 2023-03-06 PROCEDURE — 80061 LIPID PANEL: CPT

## 2023-03-06 PROCEDURE — 36415 COLL VENOUS BLD VENIPUNCTURE: CPT

## 2023-03-10 ENCOUNTER — OFFICE VISIT (OUTPATIENT)
Dept: MEDICAL GROUP | Facility: MEDICAL CENTER | Age: 68
End: 2023-03-10
Payer: COMMERCIAL

## 2023-03-10 VITALS
OXYGEN SATURATION: 96 % | RESPIRATION RATE: 16 BRPM | HEART RATE: 82 BPM | SYSTOLIC BLOOD PRESSURE: 120 MMHG | DIASTOLIC BLOOD PRESSURE: 68 MMHG | BODY MASS INDEX: 31.15 KG/M2 | WEIGHT: 165 LBS | HEIGHT: 61 IN | TEMPERATURE: 97.2 F

## 2023-03-10 DIAGNOSIS — R07.81 RIB PAIN ON LEFT SIDE: ICD-10-CM

## 2023-03-10 DIAGNOSIS — E11.65 UNCONTROLLED TYPE 2 DIABETES MELLITUS WITH HYPERGLYCEMIA (HCC): ICD-10-CM

## 2023-03-10 DIAGNOSIS — Z23 NEED FOR VACCINATION: ICD-10-CM

## 2023-03-10 DIAGNOSIS — E78.5 HYPERLIPIDEMIA WITH TARGET LDL LESS THAN 70: ICD-10-CM

## 2023-03-10 DIAGNOSIS — R79.89 LOW VITAMIN D LEVEL: ICD-10-CM

## 2023-03-10 DIAGNOSIS — L81.9 SKIN HYPOPIGMENTATION: ICD-10-CM

## 2023-03-10 PROCEDURE — 90471 IMMUNIZATION ADMIN: CPT | Performed by: INTERNAL MEDICINE

## 2023-03-10 PROCEDURE — 90662 IIV NO PRSV INCREASED AG IM: CPT | Performed by: INTERNAL MEDICINE

## 2023-03-10 PROCEDURE — 99214 OFFICE O/P EST MOD 30 MIN: CPT | Mod: 25 | Performed by: INTERNAL MEDICINE

## 2023-03-10 RX ORDER — METRONIDAZOLE 250 MG/1
TABLET ORAL
COMMUNITY
Start: 2023-02-03 | End: 2023-04-04

## 2023-03-10 RX ORDER — ERGOCALCIFEROL 1.25 MG/1
50000 CAPSULE ORAL
Qty: 12 CAPSULE | Refills: 3 | Status: SHIPPED | OUTPATIENT
Start: 2023-03-10

## 2023-03-10 RX ORDER — DOXYCYCLINE HYCLATE 100 MG/1
CAPSULE ORAL
COMMUNITY
Start: 2023-02-03 | End: 2023-04-04

## 2023-03-10 RX ORDER — ORAL SEMAGLUTIDE 7 MG/1
1 TABLET ORAL DAILY
Qty: 30 TABLET | Refills: 0 | Status: SHIPPED | OUTPATIENT
Start: 2023-04-10 | End: 2023-05-16

## 2023-03-10 RX ORDER — ORAL SEMAGLUTIDE 14 MG/1
1 TABLET ORAL DAILY
Qty: 30 TABLET | Refills: 1 | Status: SHIPPED | OUTPATIENT
Start: 2023-05-10 | End: 2023-05-16

## 2023-03-10 RX ORDER — ORAL SEMAGLUTIDE 3 MG/1
1 TABLET ORAL DAILY
Qty: 30 TABLET | Refills: 0 | Status: SHIPPED | OUTPATIENT
Start: 2023-03-10 | End: 2023-05-16

## 2023-03-10 ASSESSMENT — PATIENT HEALTH QUESTIONNAIRE - PHQ9: CLINICAL INTERPRETATION OF PHQ2 SCORE: 0

## 2023-03-10 ASSESSMENT — FIBROSIS 4 INDEX: FIB4 SCORE: 1.01

## 2023-03-11 NOTE — PROGRESS NOTES
Established Patient    Domonique presents today with the following:    CC: Follow-up for chronic medical problems, pain on left lower chest /back    HPI:   Domonique is a 68 y.o. female who came in for above.    She has been doing well.  Recently, she started having pain in left thoracic back.  She feels hardness in that area when she lies down.    Her fasting glucose has been ranging from 10 8-1 55.  She has been compliant with medications.    She was recently treated for H. pylori with some improvement in lower abdominal pain.  She is currently taking omeprazole.    ROS:   As above    Patient Active Problem List    Diagnosis Date Noted    Postmenopause 11/20/2020    History of ischemic stroke 09/15/2020    Low vitamin D level 09/15/2020    Diabetic polyneuropathy associated with type 2 diabetes mellitus (HCC) 08/04/2020    Oliguria 08/04/2020    Onychomycosis 08/04/2020    Intertrigo 08/04/2020    Other hemorrhoids 08/04/2020    Postural dizziness with presyncope 08/04/2020    Fall from ground level 08/04/2020    Other fatigue 08/04/2020    Snoring 08/04/2020    Hair loss 08/04/2020    Chronic constipation 08/04/2020    Chronic left hip pain 10/19/2015    Fibroid, uterine 06/12/2015    Hyperlipidemia with target LDL less than 70 06/12/2015    GERD (gastroesophageal reflux disease) 04/16/2015    Varicose veins 04/13/2015    Type II diabetes mellitus, uncontrolled 04/13/2015       Current Outpatient Medications   Medication Sig Dispense Refill    Semaglutide (RYBELSUS) 3 MG Tab Take 1 Tablet by mouth every day. 30 Tablet 0    [START ON 4/10/2023] Semaglutide (RYBELSUS) 7 MG Tab Take 1 Tablet by mouth every day. To start after finishing 3 mg pills 30 Tablet 0    [START ON 5/10/2023] Semaglutide (RYBELSUS) 14 MG Tab Take 1 Tablet by mouth every day. To start after finishing 7 mg pills 30 Tablet 1    ergocalciferol (DRISDOL) 43508 UNIT capsule Take 1 Capsule by mouth every 7 days. 12 Capsule 3    insulin detemir  (LEVEMIR FLEXTOUCH) 100 UNIT/ML injection PEN Inject 25 Units under the skin every morning. 5 Each 2    metFORMIN (GLUCOPHAGE) 500 MG Tab Take 1 Tablet by mouth 2 times a day with meals. 180 Tablet 3    rosuvastatin (CRESTOR) 5 MG Tab Take 1 Tablet by mouth every evening. 90 Tablet 3    aspirin (ASA) 81 MG Chew Tab chewable tablet Chew 1 Tablet every day. 90 Tablet 3    Insulin Pen Needle (PEN NEEDLES) 32G X 4 MM Misc 1 Each every day. 100 Each 3    Lancets Lancets order: Lancets for Accucheck Guide meter. Sig: use once daily and prn ssx high or low sugar. #100 RF x 3 100 Each 3    doxycycline (VIBRAMYCIN) 100 MG Cap TAKE 1 CAPSULE BY MOUTH TWICE A DAY FOR 14 DAYS      metroNIDAZOLE (FLAGYL) 250 MG Tab TAKE 1 TABLET BY MOUTH 4 TIMES A DAY FOR 14 DAYS      pantoprazole (PROTONIX) 40 MG Tablet Delayed Response TAKE 1 TABLET BY MOUTH ONCE A DAY 30 MINUTES BEFORE BREAKFAST MEAL      estradiol (ESTRACE) 0.1 MG/GM vaginal cream Apply intravaginally. 1 g daily x 2 week, then 0.5 g twice a week. 42.5 g 3    Empagliflozin 25 MG Tab Take 25 mg by mouth every day. 90 Tablet 3    ACCU-CHEK GUIDE strip USE TWICE DAILY AND AS NEEDED FOR HIGH OR LOW BLOOD SUGAR 100 Strip 3    methocarbamol (ROBAXIN) 750 MG Tab Take 1 Tablet by mouth at bedtime. 30 Tablet 2    gabapentin (NEURONTIN) 100 MG Cap Take 1-3 Capsules by mouth at bedtime as needed (tingling /numbness in feet). 90 Capsule 2    omeprazole (PRILOSEC) 20 MG delayed-release capsule Take 1 Capsule by mouth every day. 90 Capsule 3    triamcinolone acetonide (KENALOG) 0.1 % Cream Apply a thin layer on skin rashes twice daily x 1 week as needed for flares 45 g 3    Blood Glucose Test Strips Test strips order: Test strips for Accucheck guide meter. Sig: use twice daily and prn ssx high or low sugar #100 RF x 3 (may exchange from brand strip compatible with her machine) 100 Each 3    miconazole (MICOTIN) 2 % Cream Apply 1 Application topically 2 times a day. Apply on feet rash and  "groin rash 113 g 1    ibuprofen (MOTRIN) 200 MG Tab Take 400 mg by mouth every 6 hours as needed for Mild Pain.       No current facility-administered medications for this visit.         /68 (Patient Position: Sitting)   Pulse 82   Temp 36.2 °C (97.2 °F) (Temporal)   Resp 16   Ht 1.54 m (5' 0.63\")   Wt 74.8 kg (165 lb)   SpO2 96%   BMI 31.56 kg/m²     Physical Exam  General: Alert and oriented, No apparent distress.  Neck: Supple. No cervical or supraclavicular lymphadenopathy noted. Thyroid not enlarged.  Lungs: Clear to auscultation bilaterally without any wheezing, crepitations.  Cardiovascular: Regular rate and rhythm. No murmurs, rubs or gallops.  Abdomen: Bowel sound +, soft, non tender, no rebound or guarding, no palpable organomegaly  Extremities: No edema.      Assessment and Plan    1. Uncontrolled type 2 diabetes mellitus with hyperglycemia (HCC)  She is using insulin glargine 19 units daily, metformin, Jardiance.  A1c is not at target.  Discussed about using Rybelsus versus acarbose.  Will attempt Rybelsus first if insurance coverage is okay.  Discussed possible nausea as side effects.  - Semaglutide (RYBELSUS) 3 MG Tab; Take 1 Tablet by mouth every day.  Dispense: 30 Tablet; Refill: 0  - Semaglutide (RYBELSUS) 7 MG Tab; Take 1 Tablet by mouth every day. To start after finishing 3 mg pills  Dispense: 30 Tablet; Refill: 0  - Semaglutide (RYBELSUS) 14 MG Tab; Take 1 Tablet by mouth every day. To start after finishing 7 mg pills  Dispense: 30 Tablet; Refill: 1  - Comp Metabolic Panel; Future  - MICROALBUMIN CREAT RATIO URINE; Future  - HEMOGLOBIN A1C; Future  - Lipid Profile; Future    2. Low vitamin D level  - start ergocalciferol (DRISDOL) 98575 UNIT capsule; Take 1 Capsule by mouth every 7 days.  Dispense: 12 Capsule; Refill: 3  - VITAMIN D,25 HYDROXY (DEFICIENCY); Future    3. Hyperlipidemia with target LDL less than 70  Controlled.  Continue statin.    4. Skin hypopigmentation  Vitiligo on " arms.  Reassured.  - TSH WITH REFLEX TO FT4; Future  - CBC WITHOUT DIFFERENTIAL; Future    5. Rib pain on left side  No palpable abnormality on exam.  Pain on the ribs with pressure.  We will start with rib x-ray.  If normal, can consider abdominal ultrasound to check spleen.  - [id number] (WITH 1-VIEW CXR) LEFT; Future    6. Need for vaccination  - INFLUENZA VACCINE, HIGH DOSE (65+ ONLY)             Return in about 3 months (around 6/10/2023).         Signed by: Mena Christensen M.D.

## 2023-03-28 DIAGNOSIS — E11.65 UNCONTROLLED TYPE 2 DIABETES MELLITUS WITH HYPERGLYCEMIA (HCC): ICD-10-CM

## 2023-04-04 ENCOUNTER — APPOINTMENT (OUTPATIENT)
Dept: RADIOLOGY | Facility: MEDICAL CENTER | Age: 68
End: 2023-04-04
Attending: EMERGENCY MEDICINE
Payer: COMMERCIAL

## 2023-04-04 ENCOUNTER — HOSPITAL ENCOUNTER (OUTPATIENT)
Facility: MEDICAL CENTER | Age: 68
End: 2023-04-05
Attending: EMERGENCY MEDICINE | Admitting: INTERNAL MEDICINE
Payer: COMMERCIAL

## 2023-04-04 ENCOUNTER — APPOINTMENT (OUTPATIENT)
Dept: CARDIOLOGY | Facility: MEDICAL CENTER | Age: 68
End: 2023-04-04
Attending: INTERNAL MEDICINE
Payer: COMMERCIAL

## 2023-04-04 DIAGNOSIS — R07.9 ACUTE CHEST PAIN: ICD-10-CM

## 2023-04-04 PROBLEM — E11.42 DIABETIC POLYNEUROPATHY ASSOCIATED WITH TYPE 2 DIABETES MELLITUS (HCC): Status: RESOLVED | Noted: 2020-08-04 | Resolved: 2023-04-04

## 2023-04-04 PROBLEM — E11.40 TYPE 2 DIABETES MELLITUS WITH DIABETIC NEUROPATHY (HCC): Status: ACTIVE | Noted: 2023-04-04

## 2023-04-04 PROBLEM — Z79.4 TYPE 2 DIABETES MELLITUS WITH DIABETIC POLYNEUROPATHY, WITH LONG-TERM CURRENT USE OF INSULIN (HCC): Status: ACTIVE | Noted: 2023-04-04

## 2023-04-04 PROBLEM — G44.89 OTHER HEADACHE SYNDROME: Status: ACTIVE | Noted: 2023-04-04

## 2023-04-04 PROBLEM — E11.42 TYPE 2 DIABETES MELLITUS WITH DIABETIC POLYNEUROPATHY, WITH LONG-TERM CURRENT USE OF INSULIN (HCC): Status: ACTIVE | Noted: 2023-04-04

## 2023-04-04 PROBLEM — Z79.4 LONG-TERM INSULIN USE (HCC): Chronic | Status: ACTIVE | Noted: 2023-04-04

## 2023-04-04 LAB
ALBUMIN SERPL BCP-MCNC: 4.2 G/DL (ref 3.2–4.9)
ALBUMIN/GLOB SERPL: 1.3 G/DL
ALP SERPL-CCNC: 90 U/L (ref 30–99)
ALT SERPL-CCNC: 10 U/L (ref 2–50)
ANION GAP SERPL CALC-SCNC: 15 MMOL/L (ref 7–16)
APTT PPP: 31.8 SEC (ref 24.7–36)
AST SERPL-CCNC: 14 U/L (ref 12–45)
BASOPHILS # BLD AUTO: 0.6 % (ref 0–1.8)
BASOPHILS # BLD: 0.06 K/UL (ref 0–0.12)
BILIRUB SERPL-MCNC: 0.2 MG/DL (ref 0.1–1.5)
BUN SERPL-MCNC: 20 MG/DL (ref 8–22)
CALCIUM ALBUM COR SERPL-MCNC: 9.7 MG/DL (ref 8.5–10.5)
CALCIUM SERPL-MCNC: 9.9 MG/DL (ref 8.4–10.2)
CHLORIDE SERPL-SCNC: 103 MMOL/L (ref 96–112)
CO2 SERPL-SCNC: 21 MMOL/L (ref 20–33)
CREAT SERPL-MCNC: 0.63 MG/DL (ref 0.5–1.4)
EKG IMPRESSION: NORMAL
EOSINOPHIL # BLD AUTO: 0.19 K/UL (ref 0–0.51)
EOSINOPHIL NFR BLD: 1.8 % (ref 0–6.9)
ERYTHROCYTE [DISTWIDTH] IN BLOOD BY AUTOMATED COUNT: 48.2 FL (ref 35.9–50)
GFR SERPLBLD CREATININE-BSD FMLA CKD-EPI: 97 ML/MIN/1.73 M 2
GLOBULIN SER CALC-MCNC: 3.3 G/DL (ref 1.9–3.5)
GLUCOSE BLD STRIP.AUTO-MCNC: 154 MG/DL (ref 65–99)
GLUCOSE BLD STRIP.AUTO-MCNC: 92 MG/DL (ref 65–99)
GLUCOSE SERPL-MCNC: 210 MG/DL (ref 65–99)
HCT VFR BLD AUTO: 44 % (ref 37–47)
HGB BLD-MCNC: 14.4 G/DL (ref 12–16)
IMM GRANULOCYTES # BLD AUTO: 0.04 K/UL (ref 0–0.11)
IMM GRANULOCYTES NFR BLD AUTO: 0.4 % (ref 0–0.9)
INR PPP: 0.98 (ref 0.87–1.13)
LIPASE SERPL-CCNC: 26 U/L (ref 7–58)
LYMPHOCYTES # BLD AUTO: 2.93 K/UL (ref 1–4.8)
LYMPHOCYTES NFR BLD: 27.9 % (ref 22–41)
MCH RBC QN AUTO: 29 PG (ref 27–33)
MCHC RBC AUTO-ENTMCNC: 32.7 G/DL (ref 33.6–35)
MCV RBC AUTO: 88.7 FL (ref 81.4–97.8)
MONOCYTES # BLD AUTO: 0.49 K/UL (ref 0–0.85)
MONOCYTES NFR BLD AUTO: 4.7 % (ref 0–13.4)
NEUTROPHILS # BLD AUTO: 6.78 K/UL (ref 2–7.15)
NEUTROPHILS NFR BLD: 64.6 % (ref 44–72)
NRBC # BLD AUTO: 0 K/UL
NRBC BLD-RTO: 0 /100 WBC
PLATELET # BLD AUTO: 236 K/UL (ref 164–446)
PMV BLD AUTO: 11.5 FL (ref 9–12.9)
POTASSIUM SERPL-SCNC: 3.9 MMOL/L (ref 3.6–5.5)
PROT SERPL-MCNC: 7.5 G/DL (ref 6–8.2)
PROTHROMBIN TIME: 12.9 SEC (ref 12–14.6)
RBC # BLD AUTO: 4.96 M/UL (ref 4.2–5.4)
SODIUM SERPL-SCNC: 139 MMOL/L (ref 135–145)
TROPONIN T SERPL-MCNC: 13 NG/L (ref 6–19)
TROPONIN T SERPL-MCNC: 49 NG/L (ref 6–19)
TSH SERPL DL<=0.005 MIU/L-ACNC: 1.32 UIU/ML (ref 0.38–5.33)
WBC # BLD AUTO: 10.5 K/UL (ref 4.8–10.8)

## 2023-04-04 PROCEDURE — 93005 ELECTROCARDIOGRAM TRACING: CPT

## 2023-04-04 PROCEDURE — 84484 ASSAY OF TROPONIN QUANT: CPT

## 2023-04-04 PROCEDURE — 99222 1ST HOSP IP/OBS MODERATE 55: CPT | Performed by: INTERNAL MEDICINE

## 2023-04-04 PROCEDURE — 83690 ASSAY OF LIPASE: CPT

## 2023-04-04 PROCEDURE — 93306 TTE W/DOPPLER COMPLETE: CPT

## 2023-04-04 PROCEDURE — 85730 THROMBOPLASTIN TIME PARTIAL: CPT

## 2023-04-04 PROCEDURE — 85025 COMPLETE CBC W/AUTO DIFF WBC: CPT

## 2023-04-04 PROCEDURE — 700117 HCHG RX CONTRAST REV CODE 255: Performed by: INTERNAL MEDICINE

## 2023-04-04 PROCEDURE — 36415 COLL VENOUS BLD VENIPUNCTURE: CPT

## 2023-04-04 PROCEDURE — 85610 PROTHROMBIN TIME: CPT

## 2023-04-04 PROCEDURE — 94760 N-INVAS EAR/PLS OXIMETRY 1: CPT

## 2023-04-04 PROCEDURE — 80053 COMPREHEN METABOLIC PANEL: CPT

## 2023-04-04 PROCEDURE — 700117 HCHG RX CONTRAST REV CODE 255: Performed by: EMERGENCY MEDICINE

## 2023-04-04 PROCEDURE — 71275 CT ANGIOGRAPHY CHEST: CPT

## 2023-04-04 PROCEDURE — 700111 HCHG RX REV CODE 636 W/ 250 OVERRIDE (IP): Performed by: INTERNAL MEDICINE

## 2023-04-04 PROCEDURE — G0378 HOSPITAL OBSERVATION PER HR: HCPCS

## 2023-04-04 PROCEDURE — 96372 THER/PROPH/DIAG INJ SC/IM: CPT

## 2023-04-04 PROCEDURE — A9270 NON-COVERED ITEM OR SERVICE: HCPCS | Performed by: INTERNAL MEDICINE

## 2023-04-04 PROCEDURE — 82962 GLUCOSE BLOOD TEST: CPT | Mod: 91

## 2023-04-04 PROCEDURE — 99285 EMERGENCY DEPT VISIT HI MDM: CPT

## 2023-04-04 PROCEDURE — 84443 ASSAY THYROID STIM HORMONE: CPT

## 2023-04-04 PROCEDURE — 93005 ELECTROCARDIOGRAM TRACING: CPT | Performed by: EMERGENCY MEDICINE

## 2023-04-04 PROCEDURE — 700102 HCHG RX REV CODE 250 W/ 637 OVERRIDE(OP): Performed by: INTERNAL MEDICINE

## 2023-04-04 PROCEDURE — 70450 CT HEAD/BRAIN W/O DYE: CPT

## 2023-04-04 RX ORDER — ENOXAPARIN SODIUM 100 MG/ML
40 INJECTION SUBCUTANEOUS DAILY
Status: DISCONTINUED | OUTPATIENT
Start: 2023-04-04 | End: 2023-04-05 | Stop reason: HOSPADM

## 2023-04-04 RX ORDER — AMOXICILLIN 250 MG
2 CAPSULE ORAL 2 TIMES DAILY
Status: DISCONTINUED | OUTPATIENT
Start: 2023-04-04 | End: 2023-04-04

## 2023-04-04 RX ORDER — ONDANSETRON 2 MG/ML
4 INJECTION INTRAMUSCULAR; INTRAVENOUS EVERY 4 HOURS PRN
Status: DISCONTINUED | OUTPATIENT
Start: 2023-04-04 | End: 2023-04-05 | Stop reason: HOSPADM

## 2023-04-04 RX ORDER — ASPIRIN 81 MG/1
81 TABLET, CHEWABLE ORAL DAILY
Status: DISCONTINUED | OUTPATIENT
Start: 2023-04-05 | End: 2023-04-05 | Stop reason: HOSPADM

## 2023-04-04 RX ORDER — POLYETHYLENE GLYCOL 3350 17 G/17G
1 POWDER, FOR SOLUTION ORAL
Status: DISCONTINUED | OUTPATIENT
Start: 2023-04-04 | End: 2023-04-04

## 2023-04-04 RX ORDER — NITROGLYCERIN 0.4 MG/1
0.4 TABLET SUBLINGUAL
Status: DISCONTINUED | OUTPATIENT
Start: 2023-04-04 | End: 2023-04-05 | Stop reason: HOSPADM

## 2023-04-04 RX ORDER — AMOXICILLIN 250 MG
2 CAPSULE ORAL 2 TIMES DAILY
Status: DISCONTINUED | OUTPATIENT
Start: 2023-04-04 | End: 2023-04-05 | Stop reason: HOSPADM

## 2023-04-04 RX ORDER — BISACODYL 10 MG
10 SUPPOSITORY, RECTAL RECTAL
Status: DISCONTINUED | OUTPATIENT
Start: 2023-04-04 | End: 2023-04-05 | Stop reason: HOSPADM

## 2023-04-04 RX ORDER — ACETAMINOPHEN 325 MG/1
650 TABLET ORAL EVERY 6 HOURS PRN
Status: DISCONTINUED | OUTPATIENT
Start: 2023-04-04 | End: 2023-04-05 | Stop reason: HOSPADM

## 2023-04-04 RX ORDER — BISACODYL 10 MG
10 SUPPOSITORY, RECTAL RECTAL
Status: DISCONTINUED | OUTPATIENT
Start: 2023-04-04 | End: 2023-04-04

## 2023-04-04 RX ORDER — ONDANSETRON 4 MG/1
4 TABLET, ORALLY DISINTEGRATING ORAL EVERY 4 HOURS PRN
Status: DISCONTINUED | OUTPATIENT
Start: 2023-04-04 | End: 2023-04-05 | Stop reason: HOSPADM

## 2023-04-04 RX ORDER — ROSUVASTATIN CALCIUM 10 MG/1
5 TABLET, COATED ORAL EVERY EVENING
Status: DISCONTINUED | OUTPATIENT
Start: 2023-04-04 | End: 2023-04-05 | Stop reason: HOSPADM

## 2023-04-04 RX ORDER — POLYETHYLENE GLYCOL 3350 17 G/17G
1 POWDER, FOR SOLUTION ORAL
Status: DISCONTINUED | OUTPATIENT
Start: 2023-04-04 | End: 2023-04-05 | Stop reason: HOSPADM

## 2023-04-04 RX ORDER — AMINOPHYLLINE 25 MG/ML
100 INJECTION, SOLUTION INTRAVENOUS
Status: DISCONTINUED | OUTPATIENT
Start: 2023-04-04 | End: 2023-04-05 | Stop reason: HOSPADM

## 2023-04-04 RX ORDER — REGADENOSON 0.08 MG/ML
0.4 INJECTION, SOLUTION INTRAVENOUS
Status: COMPLETED | OUTPATIENT
Start: 2023-04-04 | End: 2023-04-05

## 2023-04-04 RX ADMIN — ENOXAPARIN SODIUM 40 MG: 100 INJECTION SUBCUTANEOUS at 18:07

## 2023-04-04 RX ADMIN — HUMAN ALBUMIN MICROSPHERES AND PERFLUTREN 3 ML: 10; .22 INJECTION, SOLUTION INTRAVENOUS at 17:14

## 2023-04-04 RX ADMIN — SENNOSIDES AND DOCUSATE SODIUM 2 TABLET: 50; 8.6 TABLET ORAL at 18:08

## 2023-04-04 RX ADMIN — ROSUVASTATIN 5 MG: 10 TABLET, FILM COATED ORAL at 18:08

## 2023-04-04 RX ADMIN — IOHEXOL 100 ML: 350 INJECTION, SOLUTION INTRAVENOUS at 15:00

## 2023-04-04 ASSESSMENT — LIFESTYLE VARIABLES
HAVE PEOPLE ANNOYED YOU BY CRITICIZING YOUR DRINKING: NO
ON A TYPICAL DAY WHEN YOU DRINK ALCOHOL HOW MANY DRINKS DO YOU HAVE: 0
AVERAGE NUMBER OF DAYS PER WEEK YOU HAVE A DRINK CONTAINING ALCOHOL: 0
CONSUMPTION TOTAL: NEGATIVE
EVER HAD A DRINK FIRST THING IN THE MORNING TO STEADY YOUR NERVES TO GET RID OF A HANGOVER: NO
TOTAL SCORE: 0
EVER FELT BAD OR GUILTY ABOUT YOUR DRINKING: NO
ALCOHOL_USE: NO
TOTAL SCORE: 0
HAVE YOU EVER FELT YOU SHOULD CUT DOWN ON YOUR DRINKING: NO
HOW MANY TIMES IN THE PAST YEAR HAVE YOU HAD 5 OR MORE DRINKS IN A DAY: 0
TOTAL SCORE: 0

## 2023-04-04 ASSESSMENT — COGNITIVE AND FUNCTIONAL STATUS - GENERAL
DAILY ACTIVITIY SCORE: 24
MOBILITY SCORE: 24
SUGGESTED CMS G CODE MODIFIER DAILY ACTIVITY: CH
SUGGESTED CMS G CODE MODIFIER MOBILITY: CH

## 2023-04-04 ASSESSMENT — HEART SCORE
HEART SCORE: 4
TROPONIN: LESS THAN OR EQUAL TO NORMAL LIMIT
HISTORY: MODERATELY SUSPICIOUS
AGE: 65+
ECG: NORMAL
RISK FACTORS: 1-2 RISK FACTORS

## 2023-04-04 ASSESSMENT — PATIENT HEALTH QUESTIONNAIRE - PHQ9
1. LITTLE INTEREST OR PLEASURE IN DOING THINGS: NOT AT ALL
2. FEELING DOWN, DEPRESSED, IRRITABLE, OR HOPELESS: NOT AT ALL
SUM OF ALL RESPONSES TO PHQ9 QUESTIONS 1 AND 2: 0

## 2023-04-04 ASSESSMENT — FIBROSIS 4 INDEX
FIB4 SCORE: 1.26
FIB4 SCORE: 1.01

## 2023-04-04 ASSESSMENT — PAIN DESCRIPTION - PAIN TYPE: TYPE: ACUTE PAIN

## 2023-04-04 NOTE — ASSESSMENT & PLAN NOTE
Most recent A1c on 3/6/23 was 8.5%. Poorly controlled.  - initiate ADA diet, NPO after MN  - order SSI

## 2023-04-04 NOTE — ED PROVIDER NOTES
ED Provider Note    CHIEF COMPLAINT  Chief Complaint   Patient presents with    Headache    Nausea    Chest Pain       EXTERNAL RECORDS REVIEWED  Outpatient Notes on August 30, 2020 the patient had a normal head CT    HPI/ROS  LIMITATION TO HISTORY   Select: Language Icelandic,  Used   OUTSIDE HISTORIAN(S):  Family the patient's daughter interpreting    Domonique Galeano is a 68 y.o. female who presents with past medical history significant for type 2 diabetes, high cholesterol, she reports at 1130 today she had acute onset of headache and chest pain.  She also had nausea and shortness of breath.  She was given nitro by the paramedics which helped all of her symptoms.  Currently she says she has no chest pain or headache.  She denies any focal neurologic deficits.  She has never had symptoms like this before.  She denies any history of DVT or PE, she denies cardiac history.    PAST MEDICAL HISTORY   has a past medical history of Diabetic peripheral neuropathy (HCC) (8/4/2020), Hyperlipidemia LDL goal < 70 (6/12/2015), and Type II or unspecified type diabetes mellitus without mention of complication, uncontrolled (4/13/2015).    SURGICAL HISTORY   has a past surgical history that includes appendectomy.    FAMILY HISTORY  Family History   Problem Relation Age of Onset    Diabetes Mother     Diabetes Sister     Diabetes Maternal Grandmother        SOCIAL HISTORY  Social History     Tobacco Use    Smoking status: Never    Smokeless tobacco: Never   Vaping Use    Vaping Use: Never used   Substance and Sexual Activity    Alcohol use: No    Drug use: No    Sexual activity: Yes     Partners: Male     Comment:  x 36       CURRENT MEDICATIONS  Home Medications       Reviewed by Simi Vyas (Pharmacy Tech) on 04/04/23 at 1344  Med List Status: Complete     Medication Last Dose Status   ACCU-CHEK GUIDE strip Unkown Active   aspirin (ASA) 81 MG Chew Tab chewable tablet 4/4/2023 Active    BIOTIN PO 4/3/2023 Active   Blood Glucose Test Strips Unknown Active   Cyanocobalamin (B-12 PO) 4/3/2023 Active   diphenhydrAMINE HCl (ALLERGY MED PO) 4/1/2023 Active   ergocalciferol (DRISDOL) 40644 UNIT capsule 3/28/2023 Active   Insulin Degludec 200 UNIT/ML Solution Pen-injector 4/4/2023 Active   Insulin Pen Needle (PEN NEEDLES) 32G X 4 MM Misc Unknown Active   Lancets Unknown Active   metFORMIN (GLUCOPHAGE) 500 MG Tab 4/4/2023 Active   miconazole (MICOTIN) 2 % Cream 4/2/2023 Active   rosuvastatin (CRESTOR) 5 MG Tab 4/3/2023 Active   Semaglutide (RYBELSUS) 14 MG Tab NEW RX Active   Semaglutide (RYBELSUS) 3 MG Tab 4/4/2023 Active   Semaglutide (RYBELSUS) 7 MG Tab NEW RX Active                    ALLERGIES  No Known Allergies    PHYSICAL EXAM  VITAL SIGNS: /57   Pulse (!) 102   Temp 36.2 °C (97.1 °F) (Temporal)   Resp 18   Wt 74.8 kg (165 lb)   SpO2 96%   BMI 31.56 kg/m²    Constitutional: Alert.  HENT: No signs of trauma, Bilateral external ears normal, Nose normal.   Eyes: Pupils are equal and reactive, Conjunctiva normal, Non-icteric.   Neck: Normal range of motion, No tenderness, Supple, No stridor.   Lymphatic: No lymphadenopathy noted.   Cardiovascular: Tachycardic, no murmurs.  Thorax & Lungs: Normal breath sounds, No respiratory distress, No wheezing, No chest tenderness.   Abdomen: Bowel sounds normal, Soft, No tenderness, No peritoneal signs, No masses, No pulsatile masses.   Skin: Warm, Dry, No erythema, No rash.   Back: No bony tenderness, No CVA tenderness.   Extremities: Intact distal pulses, No edema, No tenderness, No cyanosis.  Musculoskeletal: Good range of motion in all major joints. No tenderness to palpation or major deformities noted.   Neurologic: Alert , Normal motor function, Normal sensory function, No focal deficits noted.   Psychiatric: Affect normal, Judgment normal, Mood normal.       DIAGNOSTIC STUDIES / PROCEDURES  EKG  I have independently interpreted this EKG  Sinus  tachycardia rate 114  Axis normal, intervals normal, no ST elevation or depression.  Compared to ECG 08/30/2020 06:46:09  Sinus rhythm no longer present  ST (T wave) deviation no longer present  My impression of this EKG, sinus tachycardia, does not meet STEMI criteria at this time.    LABS  Labs Reviewed   CBC WITH DIFFERENTIAL - Abnormal; Notable for the following components:       Result Value    MCHC 32.7 (*)     All other components within normal limits    Narrative:     Biotin intake of greater than 5 mg per day may interfere with  troponin levels, causing false low values.  Indicate which anticoagulants the patient is on:->UNKNOWN   COMP METABOLIC PANEL - Abnormal; Notable for the following components:    Glucose 210 (*)     All other components within normal limits    Narrative:     Biotin intake of greater than 5 mg per day may interfere with  troponin levels, causing false low values.  Indicate which anticoagulants the patient is on:->UNKNOWN   TROPONIN    Narrative:     Biotin intake of greater than 5 mg per day may interfere with  troponin levels, causing false low values.  Indicate which anticoagulants the patient is on:->UNKNOWN   PROTHROMBIN TIME    Narrative:     Biotin intake of greater than 5 mg per day may interfere with  troponin levels, causing false low values.  Indicate which anticoagulants the patient is on:->UNKNOWN   APTT    Narrative:     Biotin intake of greater than 5 mg per day may interfere with  troponin levels, causing false low values.  Indicate which anticoagulants the patient is on:->UNKNOWN   LIPASE    Narrative:     Biotin intake of greater than 5 mg per day may interfere with  troponin levels, causing false low values.  Indicate which anticoagulants the patient is on:->UNKNOWN   TSH    Narrative:     Biotin intake of greater than 5 mg per day may interfere with  troponin levels, causing false low values.  Indicate which anticoagulants the patient is on:->UNKNOWN   CORRECTED  CALCIUM    Narrative:     Biotin intake of greater than 5 mg per day may interfere with  troponin levels, causing false low values.  Indicate which anticoagulants the patient is on:->UNKNOWN   ESTIMATED GFR    Narrative:     Biotin intake of greater than 5 mg per day may interfere with  troponin levels, causing false low values.  Indicate which anticoagulants the patient is on:->UNKNOWN         RADIOLOGY  I have independently interpreted the diagnostic imaging associated with this visit and am waiting the final reading from the radiologist.   My preliminary interpretation is as follows: head CT negative  Radiologist interpretation:     CT-CTA CHEST PULMONARY ARTERY W/ RECONS   Final Result         1. No CT evidence of pulmonary embolism.      2. No airspace opacity. No pleural effusion. No pneumothorax.      3. A 3 mm nodule in the left lower lobe.   Fleischner Society pulmonary nodule recommendations:   Low Risk: No routine follow-up      High Risk: Optional CT at 12 months      Comments: Nodules less than 6 mm do not require routine follow-up, but certain patients at high risk with suspicious nodule morphology, upper lobe location, or both may warrant 12-month follow-up.      Low Risk - Minimal or absent history of smoking and of other known risk factors.      High Risk - History of smoking or of other known risk factors.      Note: These recommendations do not apply to lung cancer screening, patients with immunosuppression, or patients with known primary cancer.      Fleischner Society 2017 Guidelines for Management of Incidentally Detected Pulmonary Nodules in Adults      CT-HEAD W/O   Final Result         1. No acute intracranial abnormality. No evidence of acute intracranial hemorrhage or mass lesion.                           COURSE & MEDICAL DECISION MAKING    ED Observation Status? Yes; I am placing the patient in to an observation status due to a diagnostic uncertainty as well as therapeutic intensity.  Patient placed in observation status at 1:24 PM, 4/4/2023.     Observation plan is as follows: The patient presents with chest pain and tachycardia.  He has diabetes.  Labs were ordered, EKG was ordered, CT scan was ordered to evaluate for possible pulmonary embolus.    Upon Reevaluation, the patient's condition has: not improved; and will be escalated to hospitalization.    Patient discharged from ED Observation status at 3:33 PM   (Time) April 3, 2023 (Date).     INITIAL ASSESSMENT, COURSE AND PLAN  Care Narrative: The patient presents with chest pain and headache.  She is tachycardic.  CT scan was ordered to evaluate for intracranial hemorrhage, also CT chest for pulmonary embolism.  EKG does not show STEMI.        ADDITIONAL PROBLEM LIST  High cholesterol, diabetes  DISPOSITION AND DISCUSSIONS    2:56 PM  First troponin is negative.    CT scan is negative for intracranial hemorrhage.  The patient CT chest is negative for pulmonary embolism, she has a small pulmonary nodule, she has never smoked and therefore does not require follow-up according to the recommendations from radiology.      I have discussed management of the patient with the following physicians and VITO's:  I spoke with Dr. Rosangela Medley who will assess the patient for hospitalization.    Discussion of management with other QHP or appropriate source(s): None         Barriers to care at this time, including but not limited to:  none .     The patient was not ruled out for PE by PERC criteria:    AGE < 50  No estrogens, BCPs, recent surgery (4 weeks)  No hx of: DVT/PE or hemoptysis  No unilateral leg swelling  Pulse Ox > 94% and HR <100          Decision tools and prescription drugs considered including, but not limited to: PERC rule requires CT scan because her heart rate is greater than 100 and she is over 50 years old and HEART Score 4 .    The patient is in stable condition at this time.    FINAL DIAGNOSIS  1. Acute chest pain            Electronically signed by: Artur Bell M.D., 4/4/2023 1:24 PM

## 2023-04-04 NOTE — ED NOTES
Med rec updated and complete, per pt   Allergies reviewed, per pt  Interviewed pt with family at bedside with permission from pt.  Pt reports that she has not taken EMPAGLIFLOZIN or JARDIANCE 25MG, ESTRADIOL 0.1MG/GM, GABAPENTIN 100MG, OMEPRAZOLE 20MG, PANTOPRAZOLE 40MG, or TRIAMCINOLONE 0.1% cream in the last 30 days or longer.

## 2023-04-04 NOTE — ASSESSMENT & PLAN NOTE
The 10-year ASCVD risk score (Douglas CASTAÑEDA, et al., 2019) is: 15.8%    Values used to calculate the score:      Age: 68 years      Sex: Female      Is Non- : No      Diabetic: Yes      Tobacco smoker: No      Systolic Blood Pressure: 149 mmHg      Is BP treated: No      HDL Cholesterol: 48 mg/dL      Total Cholesterol: 150 mg/dL     The patient has been admitted for chest pain/ACS rule out.  HEART score: 4. Troponin 13. While cp sounds atypical, certainly given her risk factors reasonable to admit for further cardiac evaluation. DDx also includes GERD, esophageal spasm, coronary artery spasm. Earlier in the month had lipids done which showed LDL of 85. A1c 8.5% with long standing poorly controlled DM2. Adherent to asa/statin daily.  - admit to telemetry  - continue home asa 81mg daily   - continue home statin  - NGT SL PRN chest pain  - trend trop q6hr prn, trend EKGs   - check TTE to evaluate LV function and assess for SWMA  - NPO at MN for NM cardiac stress test

## 2023-04-04 NOTE — DISCHARGE PLANNING
Met with pt and daughter Stephanie. Pt speaks Maltese so Stephanie answered initial discharge assessment.     Pt lives with her  in a one level ,first floor apartment. Pt uses a cane but she is independent with ADLs and IADLs except driving because pt does not know how to drive.Pt has good family support.     PCP is Dr. Mena Christensen.   Pharmacy is [facility] on [address].       Care Transition Team Assessment    Information Source  Orientation Level: Oriented X4  Information Given By: Patient  Who is responsible for making decisions for patient? : Patient    Readmission Evaluation  Is this a readmission?: No    Elopement Risk  Legal Hold: No  Ambulatory or Self Mobile in Wheelchair: No-Not an Elopement Risk    Interdisciplinary Discharge Planning  Does Admitting Nurse Feel This Could be a Complex Discharge?: No  Primary Care Physician: Dr. Mena Christensen  Lives with - Patient's Self Care Capacity: Adult Children, Spouse  Patient or legal guardian wants to designate a caregiver: No  Support Systems: Children, Spouse / Significant Other  Housing / Facility: 1 Story Apartment / Condo  Do You Take your Prescribed Medications Regularly: Yes  Able to Return to Previous ADL's: No  Mobility Issues: No  Prior Services: None, Home-Independent  Patient Prefers to be Discharged to:: Home  Assistance Needed: No  Durable Medical Equipment: Other - Specify (cane)    Discharge Preparedness  What is your plan after discharge?: Home with help  What are your discharge supports?: Child, Spouse  Prior Functional Level: Ambulatory, Drives Self, Independent with Activities of Daily Living, Independent with Medication Management, Uses Cane  Difficulity with ADLs: None  Difficulity with IADLs: Driving    Functional Assesment  Prior Functional Level: Ambulatory, Drives Self, Independent with Activities of Daily Living, Independent with Medication Management, Uses Cane    Finances  Financial Barriers to Discharge: No  Prescription Coverage:  Yes    Vision / Hearing Impairment  Vision Impairment : Yes  Hearing Impairment : No    Values / Beliefs / Concerns  Values / Beliefs Concerns : No    Advance Directive  Advance Directive?: None    Domestic Abuse  Have you ever been the victim of abuse or violence?: No    Discharge Risks or Barriers  Discharge risks or barriers?: No  Patient risk factors: Other (comment)    Anticipated Discharge Information  Discharge Disposition: Discharged to home/self care (01)

## 2023-04-04 NOTE — ED TRIAGE NOTES
Pt IB [facility] with c/o HA, nausea and CP that started around 1030am. Pt was given nitro and aspirin pta and states her HA and CP are better.

## 2023-04-04 NOTE — ASSESSMENT & PLAN NOTE
Unclear etiology of her sudden onset headache. CT Head without any acute intracranial findings. Low suspicion for stroke. No neurologic deficits on exam. Resolved, monitor. Has hx of vertigo and chronic dizziness. Check TSH.

## 2023-04-04 NOTE — H&P
Hospital Medicine History & Physical Note    Date of Service  4/4/2023    Primary Care Physician  Mena Christensen M.D.      Code Status  Full Code    Chief Complaint  Chief Complaint   Patient presents with    Headache    Nausea    Chest Pain       History of Presenting Illness  Domonique Galeano is a 68 y.o. female who presented 4/4/2023 with headache, chest pain.    History  obtained via , [place].    Patient reports sudden onset headache few hours prior to presentation when she was closing a door. She points to her forehead stating it was severe headache starting in the front of her head and radiating to back causing her to bend over. As she bent over, she felt pressure in her chest and jaw. Additionally felt urgency to have a bowel movement but denies abd pain. Associated nausea, no SOB, no vomiting. States she gets vertigo/dizziness often but this felt different. Denies vision loss, hearing problems or swallowing/speech difficulties. She has never had such chest discomfort either. Cannot identify any worsening factors. Reports baseline fatigue with any type of exertion and chronic dizziness for the past 2 years but no ROMAN. No GRETCHEN. No orthopnea. No recent illnesses. Few months ago was treated for h.pylori and 2 days ago had GERD flare up and took omeprazole and currently denies epigastric pain or reflux sxs. Family called 911 and en route patient as given SLN which resolved all her symptoms. During interview, she denies having any headache or chest discomfort.     ER course: VS tachycardic, mildly hypertensive, normal SaO2. Trop neg. CBC/BMP unremarkable BG 200s. CTPA obtained given tachycardia without evidence of PE or pulmonary process. CT head neg. EKG w/o ST/T wave changes. Given her risk factors, asked to be admitted for rule out ACS.    I discussed the plan of care with patient.    Review of Systems  Review of Systems   All other systems reviewed and are negative.     Past Medical  History   has a past medical history of Diabetic peripheral neuropathy (HCC) (2020), Hyperlipidemia LDL goal < 70 (2015), and Type II or unspecified type diabetes mellitus without mention of complication, uncontrolled (2015).    Surgical History   has a past surgical history that includes appendectomy.     Family History  family history includes Diabetes in her maternal grandmother, mother, and sister.   Family history reviewed with patient. There is no family history that is pertinent to the chief complaint.     Social History   reports that she has never smoked. She has never used smokeless tobacco. She reports that she does not drink alcohol and does not use drugs.    Allergies  No Known Allergies    Medications  Prior to Admission Medications   Prescriptions Last Dose Informant Patient Reported? Taking?   ACCU-CHEK GUIDE strip Unkown at Unknown Patient No No   Sig: USE TWICE DAILY AND AS NEEDED FOR HIGH OR LOW BLOOD SUGAR   BIOTIN PO 4/3/2023 at 1200 Patient Yes Yes   Sig: Take 1 Tablet by mouth every day.   Blood Glucose Test Strips Unknown at Unknown Patient No No   Sig: Test strips order: Test strips for Accucheck guide meter. Sig: use twice daily and prn ssx high or low sugar #100 RF x 3 (may exchange from brand strip compatible with her machine)   Cyanocobalamin (B-12 PO) 4/3/2023 at 1200 Patient Yes Yes   Sig: Take 1 Tablet by mouth every day.   Insulin Degludec 200 UNIT/ML Solution Pen-injector 2023 at 0700 Patient No No   Sig: Inject 25 Units under the skin every day.   Insulin Pen Needle (PEN NEEDLES) 32G X 4 MM Misc Unknown at Unknown Patient No No   Si Each every day.   Lancets Unknown at Unknown Patient No No   Sig: Lancets order: Lancets for Accucheck Guide meter. Sig: use once daily and prn ssx high or low sugar. #100 RF x 3   Semaglutide (RYBELSUS) 14 MG Tab NEW RX Patient No No   Sig: Take 1 Tablet by mouth every day. To start after finishing 7 mg pills   Semaglutide  (RYBELSUS) 3 MG Tab 4/4/2023 at 0800 Patient No No   Sig: Take 1 Tablet by mouth every day.   Semaglutide (RYBELSUS) 7 MG Tab NEW RX Patient No No   Sig: Take 1 Tablet by mouth every day. To start after finishing 3 mg pills   aspirin (ASA) 81 MG Chew Tab chewable tablet 4/4/2023 at 1230 Patient No No   Sig: Chew 1 Tablet every day.   diphenhydrAMINE HCl (ALLERGY MED PO) 4/1/2023 at Unknown Patient Yes Yes   Sig: Take 2 Tablets by mouth as needed (For allergies). OTC, not sure the name or strength   ergocalciferol (DRISDOL) 87435 UNIT capsule 3/28/2023 at AM Patient No No   Sig: Take 1 Capsule by mouth every 7 days.   Patient taking differently: Take 50,000 Units by mouth every 7 days. On Tue   metFORMIN (GLUCOPHAGE) 500 MG Tab 4/4/2023 at 0900 Patient No No   Sig: Take 1 Tablet by mouth 2 times a day with meals.   miconazole (MICOTIN) 2 % Cream 4/2/2023 at Unknown Patient Yes Yes   Sig: Apply 1 Application. topically 2 times a day as needed (Apply on feet rash and groin rash).   rosuvastatin (CRESTOR) 5 MG Tab 4/3/2023 at 1900 Patient No No   Sig: Take 1 Tablet by mouth every evening.      Facility-Administered Medications: None       Physical Exam  Temp:  [36.2 °C (97.1 °F)] 36.2 °C (97.1 °F)  Pulse:  [] 99  Resp:  [18] 18  BP: (119-149)/(57-78) 149/78  SpO2:  [93 %-97 %] 95 %  Blood Pressure : 134/57   Temperature: 36.2 °C (97.1 °F)   Pulse: (!) 102   Respiration: 18   Pulse Oximetry: 96 %       Physical Exam  General: NAD, resting comfortably  HEENT: PERRLA, EOMI  Cards: RRR, no murmur or gallops, no tenderness of chest wall, JVP nl  Pulm: normal respiratory effort, CTAB, no wheezes or rhonchi  Abdomen: soft, mild epigastric and mid abdominal tenderness, + bowel sounds, no rebound tenderness or guarding  MSK: normal ROM of upper and lower extremities  Neuro: CN II-XII grossly intact, sensation/strength intact, AAOx3  Psych: Appropriate mood     Laboratory:  Recent Labs     04/04/23  1331   WBC 10.5   RBC  4.96   HEMOGLOBIN 14.4   HEMATOCRIT 44.0   MCV 88.7   MCH 29.0   MCHC 32.7*   RDW 48.2   PLATELETCT 236   MPV 11.5     Recent Labs     04/04/23  1331   SODIUM 139   POTASSIUM 3.9   CHLORIDE 103   CO2 21   GLUCOSE 210*   BUN 20   CREATININE 0.63   CALCIUM 9.9     Recent Labs     04/04/23  1331   ALTSGPT 10   ASTSGOT 14   ALKPHOSPHAT 90   TBILIRUBIN 0.2   LIPASE 26   GLUCOSE 210*     Recent Labs     04/04/23  1331   APTT 31.8   INR 0.98     No results for input(s): NTPROBNP in the last 72 hours.      Recent Labs     04/04/23  1331   TROPONINT 13       Imaging:  CT-CTA CHEST PULMONARY ARTERY W/ RECONS   Final Result         1. No CT evidence of pulmonary embolism.      2. No airspace opacity. No pleural effusion. No pneumothorax.      3. A 3 mm nodule in the left lower lobe.   Fleischner Society pulmonary nodule recommendations:   Low Risk: No routine follow-up      High Risk: Optional CT at 12 months      Comments: Nodules less than 6 mm do not require routine follow-up, but certain patients at high risk with suspicious nodule morphology, upper lobe location, or both may warrant 12-month follow-up.      Low Risk - Minimal or absent history of smoking and of other known risk factors.      High Risk - History of smoking or of other known risk factors.      Note: These recommendations do not apply to lung cancer screening, patients with immunosuppression, or patients with known primary cancer.      Fleischner Society 2017 Guidelines for Management of Incidentally Detected Pulmonary Nodules in Adults      CT-HEAD W/O   Final Result         1. No acute intracranial abnormality. No evidence of acute intracranial hemorrhage or mass lesion.                     EC-ECHOCARDIOGRAM COMPLETE W/ CONT    (Results Pending)       EKG:  I have personally reviewed the images and compared with prior images.  My impression is sinus tachycardia, no ischemic changes.    Assessment/Plan:  Justification for Admission Status  I anticipate this  patient is appropriate for observation status at this time because rule out chest pain    Patient will need a Telemetry bed on CARDIOLOGY service .  The need is secondary to rule out ACS.    * Acute chest pain- (present on admission)  Assessment & Plan  The 10-year ASCVD risk score (Douglas CASTAÑEDA, et al., 2019) is: 15.8%    Values used to calculate the score:      Age: 68 years      Sex: Female      Is Non- : No      Diabetic: Yes      Tobacco smoker: No      Systolic Blood Pressure: 149 mmHg      Is BP treated: No      HDL Cholesterol: 48 mg/dL      Total Cholesterol: 150 mg/dL     The patient has been admitted for chest pain/ACS rule out.  HEART score: 4. Troponin 13. While cp sounds atypical, certainly given her risk factors reasonable to admit for further cardiac evaluation. DDx also includes GERD, esophageal spasm, coronary artery spasm. Earlier in the month had lipids done which showed LDL of 85. A1c 8.5% with long standing poorly controlled DM2. Adherent to asa/statin daily.  - admit to telemetry  - continue home asa 81mg daily   - continue home statin  - NGT SL PRN chest pain  - trend trop q6hr prn, trend EKGs   - check TTE to evaluate LV function and assess for SWMA  - NPO at MN for NM cardiac stress test      Other headache syndrome  Assessment & Plan  Unclear etiology of her sudden onset headache. CT Head without any acute intracranial findings. Low suspicion for stroke. No neurologic deficits on exam. Resolved, monitor. Has hx of vertigo and chronic dizziness. Check TSH.    Long-term insulin use (HCC)  Assessment & Plan  Continue home insulin at half dose for tomorrow given NPO status    Type 2 diabetes mellitus with diabetic polyneuropathy, with long-term current use of insulin (HCC)  Assessment & Plan  Most recent A1c on 3/6/23 was 8.5%. Poorly controlled.  - initiate ADA diet, NPO after MN  - order SSI    Hyperlipidemia with target LDL less than 70- (present on  admission)  Assessment & Plan  Well controlled. Lipid panel 3/6/23 with LDL 85, HDL 48, TG 84    GERD (gastroesophageal reflux disease)- (present on admission)  Assessment & Plan  Noted, takes home PPI. Continue.        VTE prophylaxis: enoxaparin ppx

## 2023-04-05 ENCOUNTER — APPOINTMENT (OUTPATIENT)
Dept: RADIOLOGY | Facility: MEDICAL CENTER | Age: 68
End: 2023-04-05
Attending: INTERNAL MEDICINE
Payer: COMMERCIAL

## 2023-04-05 VITALS
BODY MASS INDEX: 28.3 KG/M2 | OXYGEN SATURATION: 94 % | HEIGHT: 64 IN | SYSTOLIC BLOOD PRESSURE: 122 MMHG | WEIGHT: 165.79 LBS | HEART RATE: 81 BPM | RESPIRATION RATE: 18 BRPM | TEMPERATURE: 97.6 F | DIASTOLIC BLOOD PRESSURE: 58 MMHG

## 2023-04-05 LAB
ANION GAP SERPL CALC-SCNC: 10 MMOL/L (ref 7–16)
BUN SERPL-MCNC: 19 MG/DL (ref 8–22)
CALCIUM SERPL-MCNC: 9.5 MG/DL (ref 8.4–10.2)
CHLORIDE SERPL-SCNC: 108 MMOL/L (ref 96–112)
CO2 SERPL-SCNC: 25 MMOL/L (ref 20–33)
CREAT SERPL-MCNC: 0.62 MG/DL (ref 0.5–1.4)
GFR SERPLBLD CREATININE-BSD FMLA CKD-EPI: 97 ML/MIN/1.73 M 2
GLUCOSE BLD STRIP.AUTO-MCNC: 123 MG/DL (ref 65–99)
GLUCOSE BLD STRIP.AUTO-MCNC: 90 MG/DL (ref 65–99)
GLUCOSE SERPL-MCNC: 127 MG/DL (ref 65–99)
LV EJECT FRACT  99904: 70
MAGNESIUM SERPL-MCNC: 2 MG/DL (ref 1.5–2.5)
POTASSIUM SERPL-SCNC: 3.9 MMOL/L (ref 3.6–5.5)
SODIUM SERPL-SCNC: 143 MMOL/L (ref 135–145)
TROPONIN T SERPL-MCNC: 29 NG/L (ref 6–19)
TROPONIN T SERPL-MCNC: 43 NG/L (ref 6–19)

## 2023-04-05 PROCEDURE — 700111 HCHG RX REV CODE 636 W/ 250 OVERRIDE (IP): Performed by: INTERNAL MEDICINE

## 2023-04-05 PROCEDURE — 96372 THER/PROPH/DIAG INJ SC/IM: CPT

## 2023-04-05 PROCEDURE — 83735 ASSAY OF MAGNESIUM: CPT

## 2023-04-05 PROCEDURE — 99239 HOSP IP/OBS DSCHRG MGMT >30: CPT | Performed by: HOSPITALIST

## 2023-04-05 PROCEDURE — 80048 BASIC METABOLIC PNL TOTAL CA: CPT

## 2023-04-05 PROCEDURE — 96374 THER/PROPH/DIAG INJ IV PUSH: CPT

## 2023-04-05 PROCEDURE — 78452 HT MUSCLE IMAGE SPECT MULT: CPT | Mod: 26 | Performed by: INTERNAL MEDICINE

## 2023-04-05 PROCEDURE — A9270 NON-COVERED ITEM OR SERVICE: HCPCS | Performed by: INTERNAL MEDICINE

## 2023-04-05 PROCEDURE — 36415 COLL VENOUS BLD VENIPUNCTURE: CPT

## 2023-04-05 PROCEDURE — 94760 N-INVAS EAR/PLS OXIMETRY 1: CPT

## 2023-04-05 PROCEDURE — 93306 TTE W/DOPPLER COMPLETE: CPT | Mod: 26 | Performed by: INTERNAL MEDICINE

## 2023-04-05 PROCEDURE — 84484 ASSAY OF TROPONIN QUANT: CPT

## 2023-04-05 PROCEDURE — 82962 GLUCOSE BLOOD TEST: CPT | Mod: 91

## 2023-04-05 PROCEDURE — G0378 HOSPITAL OBSERVATION PER HR: HCPCS

## 2023-04-05 PROCEDURE — 700102 HCHG RX REV CODE 250 W/ 637 OVERRIDE(OP): Performed by: INTERNAL MEDICINE

## 2023-04-05 PROCEDURE — 78452 HT MUSCLE IMAGE SPECT MULT: CPT

## 2023-04-05 PROCEDURE — 93018 CV STRESS TEST I&R ONLY: CPT | Performed by: INTERNAL MEDICINE

## 2023-04-05 RX ADMIN — INSULIN GLARGINE-YFGN 12 UNITS: 100 INJECTION, SOLUTION SUBCUTANEOUS at 06:01

## 2023-04-05 RX ADMIN — ASPIRIN 81 MG 81 MG: 81 TABLET ORAL at 06:01

## 2023-04-05 RX ADMIN — REGADENOSON 0.4 MG: 0.08 INJECTION, SOLUTION INTRAVENOUS at 11:42

## 2023-04-05 ASSESSMENT — PAIN DESCRIPTION - PAIN TYPE: TYPE: ACUTE PAIN

## 2023-04-05 NOTE — PROGRESS NOTES
Telemetry Shift Summary     Rhythm: SR w/ BBB  Rate: 80s-90s  Measurements: 0.16/0.12/0.36  Ectopy (reported by Monitor Tech): rare PVC     Normal Values  Rhythm: Sinus  HR:   Measurements: 0.12-0.20/0.06-0.10/0.30-0.52

## 2023-04-05 NOTE — PROGRESS NOTES
4 Eyes Skin Assessment Completed by mami RN and stevie RN.    Head WDL  Ears WDL  Nose WDL  Mouth WDL  Neck WDL  Breast/Chest WDL  Shoulder Blades WDL  Spine WDL  (R) Arm/Elbow/Hand WDL  (L) Arm/Elbow/Hand WDL  Abdomen WDL  Groin WDL  Scrotum/Coccyx/Buttocks WDL  (R) Leg WDL  (L) Leg WDL  (R) Heel/Foot/Toe WDL  (L) Heel/Foot/Toe WDL          Devices In Places Tele Box      Interventions In Place Pillows    Possible Skin Injury No    Pictures Uploaded Into Epic N/A  Wound Consult Placed N/A  RN Wound Prevention Protocol Ordered No

## 2023-04-05 NOTE — PROGRESS NOTES
Discharge instructions given and discussed, signed copy in chart. Pt verbalized understanding and all questions answered. No new prescriptions. Pt discharged home with family in stable condition escorted by family and patient transport. Personal belongings with patient. IV removed and tolerated well. Tele box removed, monitor tech notified.

## 2023-04-05 NOTE — CARE PLAN
The patient is Stable - Low risk of patient condition declining or worsening    Shift Goals  Clinical Goals: repeat trop, stress test.  Patient Goals: Comfort    Progress made toward(s) clinical / shift goals:    Problem: Knowledge Deficit - Standard  Goal: Patient and family/care givers will demonstrate understanding of plan of care, disease process/condition, diagnostic tests and medications  Outcome: Progressing     Problem: Pain - Standard  Goal: Alleviation of pain or a reduction in pain to the patient’s comfort goal  Outcome: Progressing       Patient is not progressing towards the following goals:

## 2023-04-05 NOTE — DISCHARGE SUMMARY
Discharge Summary    CHIEF COMPLAINT ON ADMISSION  Chief Complaint   Patient presents with    Headache    Nausea    Chest Pain       Reason for Admission  EMS     Admission Date  4/4/2023    CODE STATUS  Full Code    HPI & HOSPITAL COURSE  Domonique Galeano is a 68 y.o. diabetic female who comes into the hospital complaining of a chest pain that is substernal.  The patient was also complaining of a headache.  The patient at this point was admitted to the hospital for evaluation and has undergone an initial evaluation with an EKG which is unchanged from previous and shows no ischemic changes.  Patient also had a chest x-ray which shows no acute cardiopulmonary process.  Patient underwent 3 sets of cardiac markers which were slightly elevated the initial 1 was 13 followed by 4943 and 29.  With the declining troponin the patient was able to undergo a stress test.  The stress test results at this point are negative.  Patient also underwent an echocardiogram which shows a normal left ventricular ejection fraction of 70% with no ischemia or cardiomyopathy.  Patient has normal valves.  Patient's chest pressure has completely resolved.  The patient complains today of being slightly dizzy because we did not give her food.  The patient at this point with a negative stress test is allowed to eat and at this point she can discharge home after eating with follow-ups with her primary care physician.  Patient's family was present at bedside.  Patient is Kinyarwanda-speaking only.  The daughter was able to translate for the patient.  Patient at this point is to follow-up with her primary care physician in 2 to 3 days.  She is alert awake oriented x3 pleasant cooperative female and all her questions were answered.    Therefore, she is discharged in good and stable condition to home with close outpatient follow-up.    The patient recovered much more quickly than anticipated on admission.    Discharge Date  4/5/2023    FOLLOW UP  ITEMS POST DISCHARGE  Follow-up with the primary care physician 2 to 3 days    DISCHARGE DIAGNOSES  Principal Problem:    Acute chest pain POA: Yes  Active Problems:    GERD (gastroesophageal reflux disease) POA: Yes    Hyperlipidemia with target LDL less than 70 POA: Yes      Overview: ICD-10 transition    Type 2 diabetes mellitus with diabetic polyneuropathy, with long-term current use of insulin (HCC) POA: Unknown    Long-term insulin use (HCC) (Chronic) POA: Unknown    Chest pain POA: Yes    Other headache syndrome POA: Unknown  Resolved Problems:    Type 2 diabetes mellitus (HCC) POA: Yes    Diabetic polyneuropathy associated with type 2 diabetes mellitus (HCC) POA: Yes      FOLLOW UP  Future Appointments   Date Time Provider Department Center   6/30/2023 11:40 AM Mena Christensen M.D. [facility]     No follow-up provider specified.    MEDICATIONS ON DISCHARGE     Medication List        CHANGE how you take these medications        Instructions   ergocalciferol 47380 UNIT capsule  What changed: additional instructions  Commonly known as: DRISDOL   Take 1 Capsule by mouth every 7 days.  Dose: 50,000 Units            CONTINUE taking these medications        Instructions   Accu-Chek Guide strip  Generic drug: glucose blood   USE TWICE DAILY AND AS NEEDED FOR HIGH OR LOW BLOOD SUGAR     ALLERGY MED PO   Take 2 Tablets by mouth as needed (For allergies). OTC, not sure the name or strength  Dose: 2 Tablet     aspirin 81 MG Chew chewable tablet  Commonly known as: ASA   Chew 1 Tablet every day.  Dose: 81 mg     B-12 PO   Take 1 Tablet by mouth every day.  Dose: 1 Tablet     BIOTIN PO   Take 1 Tablet by mouth every day.  Dose: 1 Tablet     Blood Glucose Test Strips   Test strips order: Test strips for Accucheck guide meter. Sig: use twice daily and prn ssx high or low sugar #100 RF x 3 (may exchange from brand strip compatible with her machine)     Insulin Degludec 200 UNIT/ML Sopn   Inject 25 Units under the skin  every day.  Dose: 25 Units     Lancets   Lancets order: Lancets for Accucheck Guide meter. Sig: use once daily and prn ssx high or low sugar. #100 RF x 3     metFORMIN 500 MG Tabs  Commonly known as: GLUCOPHAGE   Take 1 Tablet by mouth 2 times a day with meals.  Dose: 500 mg     miconazole 2 % Crea  Commonly known as: MICOTIN   Apply 1 Application. topically 2 times a day as needed (Apply on feet rash and groin rash).  Dose: 1 Application.     Pen Needles 32G X 4 MM Misc   1 Each every day.  Dose: 1 Each     rosuvastatin 5 MG Tabs  Commonly known as: CRESTOR   Take 1 Tablet by mouth every evening.  Dose: 5 mg     * Rybelsus 3 MG Tabs  Generic drug: Semaglutide   Take 1 Tablet by mouth every day.  Dose: 1 Tablet     * Rybelsus 7 MG Tabs  Start taking on: April 10, 2023  Generic drug: Semaglutide   Take 1 Tablet by mouth every day. To start after finishing 3 mg pills  Dose: 1 Tablet     * Rybelsus 14 MG Tabs  Start taking on: May 10, 2023  Generic drug: Semaglutide   Take 1 Tablet by mouth every day. To start after finishing 7 mg pills  Dose: 1 Tablet           * This list has 3 medication(s) that are the same as other medications prescribed for you. Read the directions carefully, and ask your doctor or other care provider to review them with you.                  Allergies  No Known Allergies    DIET  Orders Placed This Encounter   Procedures    Diet Order Diet: Regular     Standing Status:   Standing     Number of Occurrences:   1     Order Specific Question:   Diet:     Answer:   Regular [1]       ACTIVITY  As tolerated.  Weight bearing as tolerated    CONSULTATIONS  None    PROCEDURES  Nuclear imaging stress test    LABORATORY  Lab Results   Component Value Date    SODIUM 143 04/05/2023    POTASSIUM 3.9 04/05/2023    CHLORIDE 108 04/05/2023    CO2 25 04/05/2023    GLUCOSE 127 (H) 04/05/2023    BUN 19 04/05/2023    CREATININE 0.62 04/05/2023        Lab Results   Component Value Date    WBC 10.5 04/04/2023     HEMOGLOBIN 14.4 04/04/2023    HEMATOCRIT 44.0 04/04/2023    PLATELETCT 236 04/04/2023        Total time of the discharge process exceeds 38 minutes.

## 2023-04-05 NOTE — CARE PLAN
The patient is Stable - Low risk of patient condition declining or worsening    Shift Goals  Clinical Goals: Stress test in AM  Patient Goals: Comfort    Progress made toward(s) clinical / shift goals:    Problem: Knowledge Deficit - Standard  Goal: Patient and family/care givers will demonstrate understanding of plan of care, disease process/condition, diagnostic tests and medications  Outcome: Progressing  Note: Stress test scheduled for tomorrow morning, patient to be made NPO at midnight     Problem: Pain - Standard  Goal: Alleviation of pain or a reduction in pain to the patient’s comfort goal  Outcome: Progressing  Note: Patient being medicated per MAR       Patient is not progressing towards the following goals:

## 2023-05-07 DIAGNOSIS — E11.65 UNCONTROLLED TYPE 2 DIABETES MELLITUS WITH HYPERGLYCEMIA (HCC): ICD-10-CM

## 2023-05-09 RX ORDER — BLOOD SUGAR DIAGNOSTIC
STRIP MISCELLANEOUS
Qty: 100 STRIP | Refills: 3 | Status: SHIPPED | OUTPATIENT
Start: 2023-05-09

## 2023-05-11 ENCOUNTER — HOSPITAL ENCOUNTER (OUTPATIENT)
Dept: RADIOLOGY | Facility: MEDICAL CENTER | Age: 68
End: 2023-05-11
Attending: INTERNAL MEDICINE
Payer: COMMERCIAL

## 2023-05-11 DIAGNOSIS — R07.81 RIB PAIN ON LEFT SIDE: ICD-10-CM

## 2023-05-11 PROCEDURE — 71101 X-RAY EXAM UNILAT RIBS/CHEST: CPT | Mod: LT

## 2023-05-16 ENCOUNTER — OFFICE VISIT (OUTPATIENT)
Dept: MEDICAL GROUP | Facility: MEDICAL CENTER | Age: 68
End: 2023-05-16
Payer: COMMERCIAL

## 2023-05-16 VITALS
OXYGEN SATURATION: 95 % | SYSTOLIC BLOOD PRESSURE: 124 MMHG | WEIGHT: 167 LBS | HEIGHT: 63 IN | RESPIRATION RATE: 16 BRPM | HEART RATE: 89 BPM | TEMPERATURE: 97.5 F | DIASTOLIC BLOOD PRESSURE: 78 MMHG | BODY MASS INDEX: 29.59 KG/M2

## 2023-05-16 DIAGNOSIS — Z09 HOSPITAL DISCHARGE FOLLOW-UP: ICD-10-CM

## 2023-05-16 DIAGNOSIS — E11.65 UNCONTROLLED TYPE 2 DIABETES MELLITUS WITH HYPERGLYCEMIA (HCC): ICD-10-CM

## 2023-05-16 DIAGNOSIS — R22.2 NODULE OF SKIN OF CHEST: ICD-10-CM

## 2023-05-16 PROBLEM — R07.9 ACUTE CHEST PAIN: Status: RESOLVED | Noted: 2023-04-04 | Resolved: 2023-05-16

## 2023-05-16 PROCEDURE — 3078F DIAST BP <80 MM HG: CPT | Performed by: INTERNAL MEDICINE

## 2023-05-16 PROCEDURE — 99214 OFFICE O/P EST MOD 30 MIN: CPT | Performed by: INTERNAL MEDICINE

## 2023-05-16 PROCEDURE — 3074F SYST BP LT 130 MM HG: CPT | Performed by: INTERNAL MEDICINE

## 2023-05-16 RX ORDER — ORAL SEMAGLUTIDE 3 MG/1
1 TABLET ORAL DAILY
Qty: 60 TABLET | Refills: 0 | Status: SHIPPED | OUTPATIENT
Start: 2023-05-16 | End: 2023-05-16

## 2023-05-16 ASSESSMENT — FIBROSIS 4 INDEX: FIB4 SCORE: 1.26

## 2023-05-16 NOTE — PROGRESS NOTES
Established Patient    Domonique presents today with the following:    CC: Hospital discharge follow-up    HPI:   Domonique is a 68 y.o. female who came in for above.    Last month, she suddenly developed chest pressure which led to hospitalization.  She correlates it to starting new brand of insulin, switching from detemir to degludec due to insurance coverage change. She also started Rybelsus at the same time.  She is wondering if it is related to medication change.    Hospitalization course revealed mildly elevated troponin without ischemic changes or EKG.  Stress test was normal, echocardiogram was normal, CTA PE was normal.  Her symptoms resolved on its own and she was discharged.    Since she has been home, she has not been taking Rybelsus.  She has been using degludec 10 units daily instead of 19 units.  She tried increasing degludec to 12-13 units which caused chest pressure. Her -120s.        ROS:   As above    Patient Active Problem List    Diagnosis Date Noted    Type 2 diabetes mellitus with diabetic polyneuropathy, with long-term current use of insulin ([facility]) 04/04/2023    Long-term insulin use (HCC) 04/04/2023    Chest pain 04/04/2023    Other headache syndrome 04/04/2023    Postmenopause 11/20/2020    History of ischemic stroke 09/15/2020    Low vitamin D level 09/15/2020    Oliguria 08/04/2020    Onychomycosis 08/04/2020    Intertrigo 08/04/2020    Other hemorrhoids 08/04/2020    Postural dizziness with presyncope 08/04/2020    Fall from ground level 08/04/2020    Other fatigue 08/04/2020    Snoring 08/04/2020    Hair loss 08/04/2020    Chronic constipation 08/04/2020    Chronic left hip pain 10/19/2015    Fibroid, uterine 06/12/2015    Hyperlipidemia with target LDL less than 70 06/12/2015    GERD (gastroesophageal reflux disease) 04/16/2015    Varicose veins 04/13/2015       Current Outpatient Medications   Medication Sig Dispense Refill    BD PEN NEEDLE ORB 2ND GEN USE 1 PEN NEEDLE EVERY  " Each 3    Lancets Lancets order: Lancets for Accucheck Guide meter. Sig: use once daily and prn ssx high or low sugar. #100 RF x 3 100 Each 3    RYBELSUS 3 MG Tab TAKE 1 TABLET BY MOUTH EVERY DAY 30 Tablet 1    ACCU-CHEK GUIDE strip USE TWICE DAILY AND AS NEEDED FOR HIGH OR LOW BLOOD SUGAR 100 Strip 3    BIOTIN PO Take 1 Tablet by mouth every day.      Cyanocobalamin (B-12 PO) Take 1 Tablet by mouth every day.      miconazole (MICOTIN) 2 % Cream Apply 1 Application. topically 2 times a day as needed (Apply on feet rash and groin rash).      diphenhydrAMINE HCl (ALLERGY MED PO) Take 2 Tablets by mouth as needed (For allergies). OTC, not sure the name or strength      Insulin Degludec 200 UNIT/ML Solution Pen-injector Inject 25 Units under the skin every day. 15 mL 5    ergocalciferol (DRISDOL) 61966 UNIT capsule Take 1 Capsule by mouth every 7 days. 12 Capsule 3    metFORMIN (GLUCOPHAGE) 500 MG Tab Take 1 Tablet by mouth 2 times a day with meals. 180 Tablet 3    rosuvastatin (CRESTOR) 5 MG Tab Take 1 Tablet by mouth every evening. 90 Tablet 3    aspirin (ASA) 81 MG Chew Tab chewable tablet Chew 1 Tablet every day. 90 Tablet 3    Blood Glucose Test Strips Test strips order: Test strips for Accucheck guide meter. Sig: use twice daily and prn ssx high or low sugar #100 RF x 3 (may exchange from brand strip compatible with her machine) 100 Each 3     No current facility-administered medications for this visit.         /78 (BP Location: Left arm, Patient Position: Sitting, BP Cuff Size: Adult)   Pulse 89   Temp 36.4 °C (97.5 °F)   Resp 16   Ht 1.588 m (5' 2.5\")   Wt 75.8 kg (167 lb)   SpO2 95%   BMI 30.06 kg/m²     Physical Exam  General: Alert and oriented, No apparent distress.  Neck: Supple. No cervical or supraclavicular lymphadenopathy noted. Thyroid not enlarged.  Lungs: Clear to auscultation bilaterally without any wheezing, crepitations.  Cardiovascular: Regular rate and rhythm. No murmurs, " rubs or gallops.  Extremities: No edema.      Assessment and Plan    1. Hospital discharge follow-up  2. Uncontrolled type 2 diabetes mellitus with hyperglycemia (HCC)  Discussed that chest pressure is not likely from insulin, likely coincidental and likely from uncontrolled diabetes. However, she does not tolerate higher dose of insulin and FBS is under control currently. Recommended to continue current dose of 10 units of long-acting insulin, continue all other medications.  Recommended to resume Rybelsus as her A1C was not at target with even higher dose of insulin previously. We will check labs in 6 weeks and see if her statin therapy needs to be intensified for prevention of MI.    3. Nodule of skin of chest  She continues to have pain near her left lower rib posteriorly. She found a lump. There is subcut lump about 2-3 cm which is tender on palpation but no overlying skin changes or signs of infection. ? Lipoma.  Check with ultrasound.  - US-CHEST; Future        Return in about 6 weeks (around 6/27/2023).      Signed by: Mena Christensen M.D.

## 2023-06-12 ENCOUNTER — HOSPITAL ENCOUNTER (OUTPATIENT)
Dept: RADIOLOGY | Facility: MEDICAL CENTER | Age: 68
End: 2023-06-12
Attending: INTERNAL MEDICINE
Payer: COMMERCIAL

## 2023-06-12 DIAGNOSIS — R22.2 NODULE OF SKIN OF CHEST: ICD-10-CM

## 2023-06-12 PROCEDURE — 76604 US EXAM CHEST: CPT

## 2023-06-15 ENCOUNTER — TELEPHONE (OUTPATIENT)
Dept: MEDICAL GROUP | Facility: MEDICAL CENTER | Age: 68
End: 2023-06-15
Payer: COMMERCIAL

## 2023-06-15 NOTE — TELEPHONE ENCOUNTER
----- Message from Mena Christensen M.D. sent at 6/13/2023  4:28 PM PDT -----  Please let the patient know that ultrasound of chest wall in painful area did not show any concerning finding. Normal result.

## 2023-06-30 ENCOUNTER — OFFICE VISIT (OUTPATIENT)
Dept: MEDICAL GROUP | Facility: MEDICAL CENTER | Age: 68
End: 2023-06-30
Payer: COMMERCIAL

## 2023-06-30 VITALS
HEART RATE: 88 BPM | HEIGHT: 61 IN | WEIGHT: 164 LBS | SYSTOLIC BLOOD PRESSURE: 130 MMHG | RESPIRATION RATE: 16 BRPM | TEMPERATURE: 97.8 F | BODY MASS INDEX: 30.96 KG/M2 | OXYGEN SATURATION: 93 % | DIASTOLIC BLOOD PRESSURE: 76 MMHG

## 2023-06-30 DIAGNOSIS — R10.13 DYSPEPSIA: ICD-10-CM

## 2023-06-30 DIAGNOSIS — E11.65 UNCONTROLLED TYPE 2 DIABETES MELLITUS WITH HYPERGLYCEMIA (HCC): ICD-10-CM

## 2023-06-30 DIAGNOSIS — M54.50 CHRONIC BILATERAL LOW BACK PAIN WITHOUT SCIATICA: ICD-10-CM

## 2023-06-30 DIAGNOSIS — R29.898 WEAKNESS OF LEFT LOWER EXTREMITY: ICD-10-CM

## 2023-06-30 DIAGNOSIS — G89.29 CHRONIC BILATERAL LOW BACK PAIN WITHOUT SCIATICA: ICD-10-CM

## 2023-06-30 DIAGNOSIS — Z23 NEED FOR VACCINATION: ICD-10-CM

## 2023-06-30 DIAGNOSIS — R20.0 BILATERAL LEG NUMBNESS: ICD-10-CM

## 2023-06-30 DIAGNOSIS — M16.12 PRIMARY OSTEOARTHRITIS OF LEFT HIP: ICD-10-CM

## 2023-06-30 PROCEDURE — 90677 PCV20 VACCINE IM: CPT | Performed by: INTERNAL MEDICINE

## 2023-06-30 PROCEDURE — 3078F DIAST BP <80 MM HG: CPT | Performed by: INTERNAL MEDICINE

## 2023-06-30 PROCEDURE — 3075F SYST BP GE 130 - 139MM HG: CPT | Performed by: INTERNAL MEDICINE

## 2023-06-30 PROCEDURE — 99214 OFFICE O/P EST MOD 30 MIN: CPT | Mod: 25 | Performed by: INTERNAL MEDICINE

## 2023-06-30 PROCEDURE — 90471 IMMUNIZATION ADMIN: CPT | Performed by: INTERNAL MEDICINE

## 2023-06-30 RX ORDER — EMPAGLIFLOZIN 25 MG/1
TABLET, FILM COATED ORAL
COMMUNITY
Start: 2023-06-17 | End: 2023-09-20

## 2023-06-30 RX ORDER — OMEPRAZOLE 20 MG/1
20 CAPSULE, DELAYED RELEASE ORAL DAILY
Qty: 90 CAPSULE | Refills: 1 | Status: SHIPPED | OUTPATIENT
Start: 2023-06-30 | End: 2023-10-20 | Stop reason: SDUPTHER

## 2023-06-30 ASSESSMENT — FIBROSIS 4 INDEX: FIB4 SCORE: 1.26

## 2023-06-30 NOTE — PROGRESS NOTES
Established Patient    Domonique presents today with the following:    CC: Imaging result, leg numbness, pain    HPI:   Domonique is a 68 y.o. female who came in for above.    She came in to review ultrasound result for left lower rib pain.  She continues to have pain sleeping on the left side.  If it is longer, she also get numbness in left lower leg.  If she turns to the right side, the right leg gets numb.  With the symptoms including pain, she is tossing and turning until 4 am.  She has not been able to sleep well.  She has a hard time getting in and out of the car.    Ultrasound of right lower rib and soft tissue came back normal.  X-ray of rib and CTA chest were normal previously.      She has not done labs for her diabetes.  She is taking Rybelsus 7 mg.  She is taking it without food as instructed however she gets stomach upset.  It feels better to take it with food.  She is afraid to go up to 40 mg due to stomach upset.  She finished H. pylori treatment earlier this year.  She is no longer on PPI.    ROS:   As above    Patient Active Problem List    Diagnosis Date Noted    Type 2 diabetes mellitus with diabetic polyneuropathy, with long-term current use of insulin (HCC) 04/04/2023    Long-term insulin use (HCC) 04/04/2023    Chest pain 04/04/2023    Other headache syndrome 04/04/2023    Postmenopause 11/20/2020    History of ischemic stroke 09/15/2020    Low vitamin D level 09/15/2020    Oliguria 08/04/2020    Onychomycosis 08/04/2020    Intertrigo 08/04/2020    Other hemorrhoids 08/04/2020    Postural dizziness with presyncope 08/04/2020    Fall from ground level 08/04/2020    Other fatigue 08/04/2020    Snoring 08/04/2020    Hair loss 08/04/2020    Chronic constipation 08/04/2020    Chronic left hip pain 10/19/2015    Fibroid, uterine 06/12/2015    Hyperlipidemia with target LDL less than 70 06/12/2015    GERD (gastroesophageal reflux disease) 04/16/2015    Varicose veins 04/13/2015       Current  "Outpatient Medications   Medication Sig Dispense Refill    omeprazole (PRILOSEC) 20 MG delayed-release capsule Take 1 Capsule by mouth every day. 90 Capsule 1    BD PEN NEEDLE ROB 2ND GEN USE 1 PEN NEEDLE EVERY  Each 3    Lancets Lancets order: Lancets for Accucheck Guide meter. Sig: use once daily and prn ssx high or low sugar. #100 RF x 3 100 Each 3    JARDIANCE 25 MG Tab TAKE 25 MG BY MOUTH EVERY DAY.      RYBELSUS 3 MG Tab TAKE 1 TABLET BY MOUTH EVERY DAY 30 Tablet 1    ACCU-CHEK GUIDE strip USE TWICE DAILY AND AS NEEDED FOR HIGH OR LOW BLOOD SUGAR 100 Strip 3    BIOTIN PO Take 1 Tablet by mouth every day.      Cyanocobalamin (B-12 PO) Take 1 Tablet by mouth every day.      miconazole (MICOTIN) 2 % Cream Apply 1 Application. topically 2 times a day as needed (Apply on feet rash and groin rash).      diphenhydrAMINE HCl (ALLERGY MED PO) Take 2 Tablets by mouth as needed (For allergies). OTC, not sure the name or strength      Insulin Degludec 200 UNIT/ML Solution Pen-injector Inject 25 Units under the skin every day. 15 mL 5    ergocalciferol (DRISDOL) 03280 UNIT capsule Take 1 Capsule by mouth every 7 days. 12 Capsule 3    metFORMIN (GLUCOPHAGE) 500 MG Tab Take 1 Tablet by mouth 2 times a day with meals. 180 Tablet 3    rosuvastatin (CRESTOR) 5 MG Tab Take 1 Tablet by mouth every evening. 90 Tablet 3    aspirin (ASA) 81 MG Chew Tab chewable tablet Chew 1 Tablet every day. 90 Tablet 3    Blood Glucose Test Strips Test strips order: Test strips for Accucheck guide meter. Sig: use twice daily and prn ssx high or low sugar #100 RF x 3 (may exchange from brand strip compatible with her machine) 100 Each 3     No current facility-administered medications for this visit.         /76 (Patient Position: Sitting)   Pulse 88   Temp 36.6 °C (97.8 °F) (Temporal)   Resp 16   Ht 1.54 m (5' 0.63\")   Wt 74.4 kg (164 lb)   SpO2 93%   BMI 31.37 kg/m²     Physical Exam  General: Alert and oriented, No apparent " distress.  Lungs: Clear to auscultation bilaterally without any wheezing, crepitations.  Cardiovascular: Regular rate and rhythm. No murmurs, rubs or gallops.  Abdomen: Bowel sound +, soft, non tender, no rebound or guarding, no palpable organomegaly  Extremities: No edema.  Neuro: Lt LE 3/5, no patellar reflex on the left.    Assessment and Plan    1. Weakness of left lower extremity  Could be related to severe OA in left hip vs advanced degenerative disc disease of lower back.  MRI was ordered last year but she was not able to get it done.  Reordered.  Recommended to initiate the process of seeing orthopedics which was referred previously.  Referral renewed. Get  updated hip x-ray as well.  - MR-LUMBAR SPINE-W/O; Future    2. Primary osteoarthritis of left hip  - DX-HIP-BILATERAL-WITH PELVIS-2 VIEWS; Future  - Referral to Orthopedics    3. Bilateral leg numbness  - MR-LUMBAR SPINE-W/O; Future    4. Chronic bilateral low back pain without sciatica  - MR-LUMBAR SPINE-W/O; Future    5. Uncontrolled type 2 diabetes mellitus with hyperglycemia (HCC)  Start omeprazole to help with tolerating Rybelsus better.  Continue Rybelsus 7 mg daily for now.  We will reevaluate in 6 weeks with labs and increase the dose if A1c is not at target.  Discussed to get diabetes under control before possible hip arthroplasty.    6. Dyspepsia  - omeprazole (PRILOSEC) 20 MG delayed-release capsule; Take 1 Capsule by mouth every day.  Dispense: 90 Capsule; Refill: 1    7. Need for vaccination  - Pneumococcal Conjugate Vaccine 20-Valent (19 yrs+)             Return in about 6 weeks (around 8/11/2023).           Signed by: Mena Christensen M.D.

## 2023-09-20 RX ORDER — EMPAGLIFLOZIN 25 MG/1
TABLET, FILM COATED ORAL
Qty: 90 TABLET | Refills: 3 | Status: SHIPPED | OUTPATIENT
Start: 2023-09-20 | End: 2024-01-26 | Stop reason: SDUPTHER

## 2023-09-20 NOTE — TELEPHONE ENCOUNTER
Received request via: Pharmacy    Was the patient seen in the last year in this department? Yes    Does the patient have an active prescription (recently filled or refills available) for medication(s) requested? yes    Does the patient have [facility] and need 100 day supply (blood pressure, diabetes and cholesterol meds only)? Patient does not have SCP   Requested Prescriptions     Pending Prescriptions Disp Refills    JARDIANCE 25 MG Tab [Pharmacy Med Name: JARDIANCE 25 MG TABLET] 90 Tablet 3     Sig: TAKE 25 MG BY MOUTH EVERY DAY.

## 2023-10-08 ENCOUNTER — APPOINTMENT (OUTPATIENT)
Dept: RADIOLOGY | Facility: MEDICAL CENTER | Age: 68
End: 2023-10-08
Attending: STUDENT IN AN ORGANIZED HEALTH CARE EDUCATION/TRAINING PROGRAM
Payer: COMMERCIAL

## 2023-10-08 ENCOUNTER — HOSPITAL ENCOUNTER (EMERGENCY)
Facility: MEDICAL CENTER | Age: 68
End: 2023-10-08
Attending: STUDENT IN AN ORGANIZED HEALTH CARE EDUCATION/TRAINING PROGRAM
Payer: COMMERCIAL

## 2023-10-08 VITALS
SYSTOLIC BLOOD PRESSURE: 165 MMHG | HEART RATE: 93 BPM | TEMPERATURE: 96.9 F | DIASTOLIC BLOOD PRESSURE: 74 MMHG | OXYGEN SATURATION: 91 % | WEIGHT: 164 LBS | BODY MASS INDEX: 28 KG/M2 | HEIGHT: 64 IN | RESPIRATION RATE: 16 BRPM

## 2023-10-08 DIAGNOSIS — N30.90 CYSTITIS: ICD-10-CM

## 2023-10-08 DIAGNOSIS — K80.20 SYMPTOMATIC CHOLELITHIASIS: ICD-10-CM

## 2023-10-08 DIAGNOSIS — R10.13 EPIGASTRIC PAIN: ICD-10-CM

## 2023-10-08 LAB
ALBUMIN SERPL BCP-MCNC: 4.3 G/DL (ref 3.2–4.9)
ALBUMIN/GLOB SERPL: 1.7 G/DL
ALP SERPL-CCNC: 84 U/L (ref 30–99)
ALT SERPL-CCNC: 10 U/L (ref 2–50)
ANION GAP SERPL CALC-SCNC: 13 MMOL/L (ref 7–16)
APPEARANCE UR: CLEAR
AST SERPL-CCNC: 13 U/L (ref 12–45)
BACTERIA #/AREA URNS HPF: ABNORMAL /HPF
BASOPHILS # BLD AUTO: 0.6 % (ref 0–1.8)
BASOPHILS # BLD: 0.09 K/UL (ref 0–0.12)
BILIRUB SERPL-MCNC: 0.2 MG/DL (ref 0.1–1.5)
BILIRUB UR QL STRIP.AUTO: NEGATIVE
BUN SERPL-MCNC: 22 MG/DL (ref 8–22)
CALCIUM ALBUM COR SERPL-MCNC: 8.9 MG/DL (ref 8.5–10.5)
CALCIUM SERPL-MCNC: 9.1 MG/DL (ref 8.4–10.2)
CHLORIDE SERPL-SCNC: 103 MMOL/L (ref 96–112)
CO2 SERPL-SCNC: 24 MMOL/L (ref 20–33)
COLOR UR: YELLOW
CREAT SERPL-MCNC: 0.87 MG/DL (ref 0.5–1.4)
EKG IMPRESSION: NORMAL
EOSINOPHIL # BLD AUTO: 0.34 K/UL (ref 0–0.51)
EOSINOPHIL NFR BLD: 2.4 % (ref 0–6.9)
EPI CELLS #/AREA URNS HPF: ABNORMAL /HPF
ERYTHROCYTE [DISTWIDTH] IN BLOOD BY AUTOMATED COUNT: 50.9 FL (ref 35.9–50)
GFR SERPLBLD CREATININE-BSD FMLA CKD-EPI: 73 ML/MIN/1.73 M 2
GLOBULIN SER CALC-MCNC: 2.6 G/DL (ref 1.9–3.5)
GLUCOSE SERPL-MCNC: 190 MG/DL (ref 65–99)
GLUCOSE UR STRIP.AUTO-MCNC: >=1000 MG/DL
HCT VFR BLD AUTO: 42.8 % (ref 37–47)
HGB BLD-MCNC: 13.7 G/DL (ref 12–16)
IMM GRANULOCYTES # BLD AUTO: 0.1 K/UL (ref 0–0.11)
IMM GRANULOCYTES NFR BLD AUTO: 0.7 % (ref 0–0.9)
KETONES UR STRIP.AUTO-MCNC: ABNORMAL MG/DL
LEUKOCYTE ESTERASE UR QL STRIP.AUTO: ABNORMAL
LIPASE SERPL-CCNC: 31 U/L (ref 11–82)
LYMPHOCYTES # BLD AUTO: 3.58 K/UL (ref 1–4.8)
LYMPHOCYTES NFR BLD: 25.3 % (ref 22–41)
MCH RBC QN AUTO: 28.5 PG (ref 27–33)
MCHC RBC AUTO-ENTMCNC: 32 G/DL (ref 32.2–35.5)
MCV RBC AUTO: 89 FL (ref 81.4–97.8)
MICRO URNS: ABNORMAL
MONOCYTES # BLD AUTO: 0.66 K/UL (ref 0–0.85)
MONOCYTES NFR BLD AUTO: 4.7 % (ref 0–13.4)
MUCOUS THREADS #/AREA URNS HPF: ABNORMAL /HPF
NEUTROPHILS # BLD AUTO: 9.39 K/UL (ref 1.82–7.42)
NEUTROPHILS NFR BLD: 66.3 % (ref 44–72)
NITRITE UR QL STRIP.AUTO: NEGATIVE
NRBC # BLD AUTO: 0 K/UL
NRBC BLD-RTO: 0 /100 WBC (ref 0–0.2)
PH UR STRIP.AUTO: 5.5 [PH] (ref 5–8)
PLATELET # BLD AUTO: 196 K/UL (ref 164–446)
PMV BLD AUTO: 12.5 FL (ref 9–12.9)
POTASSIUM SERPL-SCNC: 4.5 MMOL/L (ref 3.6–5.5)
PROT SERPL-MCNC: 6.9 G/DL (ref 6–8.2)
PROT UR QL STRIP: NEGATIVE MG/DL
RBC # BLD AUTO: 4.81 M/UL (ref 4.2–5.4)
RBC UR QL AUTO: NEGATIVE
SODIUM SERPL-SCNC: 140 MMOL/L (ref 135–145)
SP GR UR STRIP.AUTO: 1.01
TROPONIN T SERPL-MCNC: 10 NG/L (ref 6–19)
WBC # BLD AUTO: 14.2 K/UL (ref 4.8–10.8)
WBC #/AREA URNS HPF: ABNORMAL /HPF

## 2023-10-08 PROCEDURE — 96375 TX/PRO/DX INJ NEW DRUG ADDON: CPT

## 2023-10-08 PROCEDURE — 85025 COMPLETE CBC W/AUTO DIFF WBC: CPT

## 2023-10-08 PROCEDURE — 700102 HCHG RX REV CODE 250 W/ 637 OVERRIDE(OP): Performed by: STUDENT IN AN ORGANIZED HEALTH CARE EDUCATION/TRAINING PROGRAM

## 2023-10-08 PROCEDURE — 74177 CT ABD & PELVIS W/CONTRAST: CPT

## 2023-10-08 PROCEDURE — 36415 COLL VENOUS BLD VENIPUNCTURE: CPT

## 2023-10-08 PROCEDURE — 83690 ASSAY OF LIPASE: CPT

## 2023-10-08 PROCEDURE — 700117 HCHG RX CONTRAST REV CODE 255: Performed by: STUDENT IN AN ORGANIZED HEALTH CARE EDUCATION/TRAINING PROGRAM

## 2023-10-08 PROCEDURE — A9270 NON-COVERED ITEM OR SERVICE: HCPCS | Performed by: STUDENT IN AN ORGANIZED HEALTH CARE EDUCATION/TRAINING PROGRAM

## 2023-10-08 PROCEDURE — 99285 EMERGENCY DEPT VISIT HI MDM: CPT

## 2023-10-08 PROCEDURE — 84484 ASSAY OF TROPONIN QUANT: CPT

## 2023-10-08 PROCEDURE — 96374 THER/PROPH/DIAG INJ IV PUSH: CPT

## 2023-10-08 PROCEDURE — 700105 HCHG RX REV CODE 258: Performed by: STUDENT IN AN ORGANIZED HEALTH CARE EDUCATION/TRAINING PROGRAM

## 2023-10-08 PROCEDURE — 80053 COMPREHEN METABOLIC PANEL: CPT

## 2023-10-08 PROCEDURE — 93005 ELECTROCARDIOGRAM TRACING: CPT | Performed by: STUDENT IN AN ORGANIZED HEALTH CARE EDUCATION/TRAINING PROGRAM

## 2023-10-08 PROCEDURE — 700111 HCHG RX REV CODE 636 W/ 250 OVERRIDE (IP): Mod: JZ | Performed by: STUDENT IN AN ORGANIZED HEALTH CARE EDUCATION/TRAINING PROGRAM

## 2023-10-08 PROCEDURE — 81001 URINALYSIS AUTO W/SCOPE: CPT

## 2023-10-08 RX ORDER — ONDANSETRON 4 MG/1
4 TABLET, ORALLY DISINTEGRATING ORAL EVERY 6 HOURS PRN
Qty: 20 TABLET | Refills: 0 | Status: SHIPPED | OUTPATIENT
Start: 2023-10-08 | End: 2023-11-10

## 2023-10-08 RX ORDER — METOCLOPRAMIDE HYDROCHLORIDE 5 MG/ML
10 INJECTION INTRAMUSCULAR; INTRAVENOUS ONCE
Status: COMPLETED | OUTPATIENT
Start: 2023-10-08 | End: 2023-10-08

## 2023-10-08 RX ORDER — NITROFURANTOIN 25; 75 MG/1; MG/1
100 CAPSULE ORAL 2 TIMES DAILY
Qty: 10 CAPSULE | Refills: 0 | Status: ACTIVE | OUTPATIENT
Start: 2023-10-08 | End: 2023-10-13

## 2023-10-08 RX ORDER — SODIUM CHLORIDE, SODIUM LACTATE, POTASSIUM CHLORIDE, CALCIUM CHLORIDE 600; 310; 30; 20 MG/100ML; MG/100ML; MG/100ML; MG/100ML
1000 INJECTION, SOLUTION INTRAVENOUS ONCE
Status: COMPLETED | OUTPATIENT
Start: 2023-10-08 | End: 2023-10-08

## 2023-10-08 RX ORDER — ONDANSETRON 2 MG/ML
4 INJECTION INTRAMUSCULAR; INTRAVENOUS ONCE
Status: COMPLETED | OUTPATIENT
Start: 2023-10-08 | End: 2023-10-08

## 2023-10-08 RX ORDER — ACETAMINOPHEN 325 MG/1
975 TABLET ORAL ONCE
Status: COMPLETED | OUTPATIENT
Start: 2023-10-08 | End: 2023-10-08

## 2023-10-08 RX ADMIN — LIDOCAINE HYDROCHLORIDE 15 ML: 20 SOLUTION OROPHARYNGEAL at 02:37

## 2023-10-08 RX ADMIN — METOCLOPRAMIDE 10 MG: 5 INJECTION, SOLUTION INTRAMUSCULAR; INTRAVENOUS at 03:05

## 2023-10-08 RX ADMIN — IOHEXOL 25 ML: 240 INJECTION, SOLUTION INTRATHECAL; INTRAVASCULAR; INTRAVENOUS; ORAL at 04:28

## 2023-10-08 RX ADMIN — ACETAMINOPHEN 975 MG: 325 TABLET ORAL at 03:05

## 2023-10-08 RX ADMIN — ONDANSETRON 4 MG: 2 INJECTION INTRAMUSCULAR; INTRAVENOUS at 02:37

## 2023-10-08 RX ADMIN — SODIUM CHLORIDE, POTASSIUM CHLORIDE, SODIUM LACTATE AND CALCIUM CHLORIDE 1000 ML: 600; 310; 30; 20 INJECTION, SOLUTION INTRAVENOUS at 03:05

## 2023-10-08 RX ADMIN — IOHEXOL 100 ML: 350 INJECTION, SOLUTION INTRAVENOUS at 04:28

## 2023-10-08 ASSESSMENT — PAIN DESCRIPTION - PAIN TYPE: TYPE: ACUTE PAIN

## 2023-10-08 ASSESSMENT — FIBROSIS 4 INDEX: FIB4 SCORE: 1.26

## 2023-10-08 NOTE — ED PROVIDER NOTES
"ED Provider Note    CHIEF COMPLAINT  Chief Complaint   Patient presents with    Abdominal Pain     Pt c/o generalized abd pain started about 10 minutes PTA    N/V     Pt was given 4mg Zofran by [facility] PTA    Dizziness     Pt c/o feeling \"weak\" and dizzy       EXTERNAL RECORDS REVIEWED  Outpatient labs & studies positive Helicobacter pylori test 1/22/2023    HPI/ROS  LIMITATION TO HISTORY   Select: Language Amharic,  Used   OUTSIDE HISTORIAN(S):  Significant other     Domonique Galeano is a 68 y.o. female who presents with severe epigastric abdominal pain with 3 episodes of vomiting that began just prior to arrival.  Patient reports similar symptoms back in January when she was told she had ulcers at that time.  Denies hematemesis, fevers also reports mild headache.    PAST MEDICAL HISTORY   has a past medical history of Diabetic peripheral neuropathy (HCC) (8/4/2020), Hyperlipidemia LDL goal < 70 (6/12/2015), and Type II or unspecified type diabetes mellitus without mention of complication, uncontrolled (4/13/2015).    SURGICAL HISTORY   has a past surgical history that includes appendectomy.    FAMILY HISTORY  Family History   Problem Relation Age of Onset    Diabetes Mother     Diabetes Sister     Diabetes Maternal Grandmother        SOCIAL HISTORY  Social History     Tobacco Use    Smoking status: Never    Smokeless tobacco: Never   Vaping Use    Vaping Use: Never used   Substance and Sexual Activity    Alcohol use: No    Drug use: No    Sexual activity: Yes     Partners: Male     Comment:  x 36       CURRENT MEDICATIONS  Home Medications    **Home medications have not yet been reviewed for this encounter**         ALLERGIES  No Known Allergies    PHYSICAL EXAM  VITAL SIGNS: BP (!) 155/63   Pulse 86   Temp 36.1 °C (96.9 °F) (Temporal)   Resp 16   Ht 1.626 m (5' 4\")   Wt 74.4 kg (164 lb)   SpO2 89%   BMI 28.15 kg/m²    Physical Exam  Vitals and nursing note reviewed. "   Constitutional:       Appearance: She is well-developed.   HENT:      Head: Normocephalic.   Eyes:      Extraocular Movements: Extraocular movements intact.      Pupils: Pupils are equal, round, and reactive to light.   Cardiovascular:      Rate and Rhythm: Normal rate and regular rhythm.   Pulmonary:      Effort: Pulmonary effort is normal.      Breath sounds: Normal breath sounds.   Abdominal:      Palpations: Abdomen is soft.      Tenderness: There is abdominal tenderness in the epigastric area.   Musculoskeletal:      Cervical back: Normal range of motion.   Neurological:      Mental Status: She is alert and oriented to person, place, and time.         DIAGNOSTIC STUDIES / PROCEDURES  EKG  I have independently interpreted this EKG  10/8/2023  Sinus rhythm no ischemic changes    LABS  Labs Reviewed   CBC WITH DIFFERENTIAL - Abnormal; Notable for the following components:       Result Value    WBC 14.2 (*)     MCHC 32.0 (*)     RDW 50.9 (*)     Neutrophils (Absolute) 9.39 (*)     All other components within normal limits   COMP METABOLIC PANEL - Abnormal; Notable for the following components:    Glucose 190 (*)     All other components within normal limits   URINALYSIS - Abnormal; Notable for the following components:    Glucose >=1000 (*)     Ketones Trace (*)     Leukocyte Esterase Small (*)     All other components within normal limits    Narrative:     Release to patient->Immediate   URINE MICROSCOPIC (W/UA) - Abnormal; Notable for the following components:    WBC 10-20 (*)     Bacteria Many (*)     All other components within normal limits    Narrative:     Release to patient->Immediate   LIPASE   TROPONIN   ESTIMATED GFR       RADIOLOGY  I have independently interpreted the diagnostic imaging associated with this visit and am waiting the final reading from the radiologist.   My preliminary interpretation is as follows: Mild right hydronephrosis with hydroureter no kidney stone visualized  Radiologist  interpretation:   CT-ABDOMEN-PELVIS WITH   Final Result      1.  There is no acute inflammatory process within the abdomen or pelvis.   2.  Large gallstones again seen. No CT evidence of acute cholecystitis or biliary dilatation.   3.  No bowel obstruction. There is moderate diffuse colonic stool. Incidental cecal bascule, nondilated.   4.  Colonic diverticulosis without diverticulitis.          COURSE & MEDICAL DECISION MAKING    ED Observation Status? No; Patient does not meet criteria for ED Observation.     INITIAL ASSESSMENT, COURSE AND PLAN  Care Narrative: 68-year-old female with a history of H. pylori positive stomach ulcers presents to the ED for severe epigastric abdominal pain with multiple episodes of vomiting that began just prior to arrival.  On exam the patient has normal vital signs and is overall well-appearing.  She does have tenderness in the epigastrium with no peritoneal signs.  Will obtain labs and EKG to rule out electrolyte abnormality, MARÍA ELENA, anemia, ACS.  Will obtain CT of the abdomen to rule out perforated ulcer, pancreatitis, hepatitis, cholecystitis.  Will address symptoms with GI cocktail, antiemetics, IV rehydration    Patient reassessed and reports symptomatic improvement.  Discussed with her the results of her CT scan which does show a gallstone.  I suspect her symptoms are likely symptomatic cholelithiasis.  I discussed with her need for outpatient general surgery follow-up and I have placed a referral.  Discussed low-fat diet and symptom management with Zofran, NSAIDs and Tylenol.  We will also treat UTI with Macrobid.  Remainder of lab work-up was reassuring she did have a mild leukocytosis that I suspect could be reactive in nature as she had no pericholecystic fluid, gallbladder wall thickening on CT to suggest cholecystitis.  Return precautions were given for intractable pain/vomiting    HYDRATION: Based on the patient's presentation of Dehydration the patient was given IV  fluids. IV Hydration was used because oral hydration was not adequate alone. Upon recheck following hydration, the patient was improved.      ADDITIONAL PROBLEM LIST  Past Medical History:   Diagnosis Date    Diabetic peripheral neuropathy (HCC) 8/4/2020    Hyperlipidemia LDL goal < 70 6/12/2015    Type II or unspecified type diabetes mellitus without mention of complication, uncontrolled 4/13/2015       DISPOSITION AND DISCUSSIONS  I have discussed management of the patient with the following physicians and VITO's:      Discussion of management with other QHP or appropriate source(s): None     Escalation of care considered, and ultimately not performed:acute inpatient care management, however at this time, the patient is most appropriate for outpatient management    Barriers to care at this time, including but not limited to:  Patient does not speak English .     Decision tools and prescription drugs considered including, but not limited to:  Low-fat diet, Tylenol, ibuprofen for pain .    FINAL DIAGNOSIS  1. Epigastric pain    2. Cystitis    3. Symptomatic cholelithiasis           Electronically signed by: Maxim Burris M.D., 10/8/2023 3:47 AM

## 2023-10-08 NOTE — ED TRIAGE NOTES
"Chief Complaint   Patient presents with    Abdominal Pain     Pt c/o generalized abd pain started about 10 minutes PTA    N/V     Pt was given 4mg Zofran by [facility] PTA    Dizziness     Pt c/o feeling \"weak\" and dizzy     BP (!) 193/77   Pulse 96   Temp 36.1 °C (96.9 °F) (Temporal)   Resp 17   Ht 1.626 m (5' 4\")   Wt 74.4 kg (164 lb)   SpO2 93%   BMI 28.15 kg/m²     Pt to ED via [facility] from home for c/o generalized abd pain and N/V.  Pt states nausea improved after receiving Zofran via [facility] PTA.   "

## 2023-10-16 ENCOUNTER — HOSPITAL ENCOUNTER (OUTPATIENT)
Dept: LAB | Facility: MEDICAL CENTER | Age: 68
End: 2023-10-16
Attending: INTERNAL MEDICINE
Payer: COMMERCIAL

## 2023-10-16 DIAGNOSIS — E11.65 UNCONTROLLED TYPE 2 DIABETES MELLITUS WITH HYPERGLYCEMIA (HCC): ICD-10-CM

## 2023-10-16 DIAGNOSIS — R79.89 LOW VITAMIN D LEVEL: ICD-10-CM

## 2023-10-16 DIAGNOSIS — L81.9 SKIN HYPOPIGMENTATION: ICD-10-CM

## 2023-10-16 LAB
25(OH)D3 SERPL-MCNC: 26 NG/ML (ref 30–100)
ALBUMIN SERPL BCP-MCNC: 4.3 G/DL (ref 3.2–4.9)
ALBUMIN/GLOB SERPL: 1.4 G/DL
ALP SERPL-CCNC: 71 U/L (ref 30–99)
ALT SERPL-CCNC: 11 U/L (ref 2–50)
ANION GAP SERPL CALC-SCNC: 9 MMOL/L (ref 7–16)
AST SERPL-CCNC: 12 U/L (ref 12–45)
BILIRUB SERPL-MCNC: 0.3 MG/DL (ref 0.1–1.5)
BUN SERPL-MCNC: 20 MG/DL (ref 8–22)
CALCIUM ALBUM COR SERPL-MCNC: 9.3 MG/DL (ref 8.5–10.5)
CALCIUM SERPL-MCNC: 9.5 MG/DL (ref 8.5–10.5)
CHLORIDE SERPL-SCNC: 108 MMOL/L (ref 96–112)
CHOLEST SERPL-MCNC: 137 MG/DL (ref 100–199)
CO2 SERPL-SCNC: 25 MMOL/L (ref 20–33)
CREAT SERPL-MCNC: 0.57 MG/DL (ref 0.5–1.4)
CREAT UR-MCNC: 89.36 MG/DL
ERYTHROCYTE [DISTWIDTH] IN BLOOD BY AUTOMATED COUNT: 52.5 FL (ref 35.9–50)
EST. AVERAGE GLUCOSE BLD GHB EST-MCNC: 194 MG/DL
GFR SERPLBLD CREATININE-BSD FMLA CKD-EPI: 99 ML/MIN/1.73 M 2
GLOBULIN SER CALC-MCNC: 3.1 G/DL (ref 1.9–3.5)
GLUCOSE SERPL-MCNC: 117 MG/DL (ref 65–99)
HBA1C MFR BLD: 8.4 % (ref 4–5.6)
HCT VFR BLD AUTO: 43.9 % (ref 37–47)
HDLC SERPL-MCNC: 49 MG/DL
HGB BLD-MCNC: 14 G/DL (ref 12–16)
LDLC SERPL CALC-MCNC: 71 MG/DL
MCH RBC QN AUTO: 28.7 PG (ref 27–33)
MCHC RBC AUTO-ENTMCNC: 31.9 G/DL (ref 32.2–35.5)
MCV RBC AUTO: 90 FL (ref 81.4–97.8)
MICROALBUMIN UR-MCNC: 4.2 MG/DL
MICROALBUMIN/CREAT UR: 47 MG/G (ref 0–30)
PLATELET # BLD AUTO: 276 K/UL (ref 164–446)
PMV BLD AUTO: 11.3 FL (ref 9–12.9)
POTASSIUM SERPL-SCNC: 4.4 MMOL/L (ref 3.6–5.5)
PROT SERPL-MCNC: 7.4 G/DL (ref 6–8.2)
RBC # BLD AUTO: 4.88 M/UL (ref 4.2–5.4)
SODIUM SERPL-SCNC: 142 MMOL/L (ref 135–145)
TRIGL SERPL-MCNC: 83 MG/DL (ref 0–149)
TSH SERPL DL<=0.005 MIU/L-ACNC: 2.44 UIU/ML (ref 0.38–5.33)
WBC # BLD AUTO: 7.6 K/UL (ref 4.8–10.8)

## 2023-10-16 PROCEDURE — 85027 COMPLETE CBC AUTOMATED: CPT

## 2023-10-16 PROCEDURE — 82043 UR ALBUMIN QUANTITATIVE: CPT

## 2023-10-16 PROCEDURE — 36415 COLL VENOUS BLD VENIPUNCTURE: CPT

## 2023-10-16 PROCEDURE — 80053 COMPREHEN METABOLIC PANEL: CPT

## 2023-10-16 PROCEDURE — 83036 HEMOGLOBIN GLYCOSYLATED A1C: CPT

## 2023-10-16 PROCEDURE — 84443 ASSAY THYROID STIM HORMONE: CPT

## 2023-10-16 PROCEDURE — 82306 VITAMIN D 25 HYDROXY: CPT

## 2023-10-16 PROCEDURE — 80061 LIPID PANEL: CPT

## 2023-10-16 PROCEDURE — 82570 ASSAY OF URINE CREATININE: CPT

## 2023-10-20 ENCOUNTER — OFFICE VISIT (OUTPATIENT)
Dept: MEDICAL GROUP | Facility: MEDICAL CENTER | Age: 68
End: 2023-10-20
Payer: COMMERCIAL

## 2023-10-20 VITALS
WEIGHT: 171.9 LBS | BODY MASS INDEX: 29.35 KG/M2 | DIASTOLIC BLOOD PRESSURE: 66 MMHG | TEMPERATURE: 96.4 F | OXYGEN SATURATION: 94 % | HEIGHT: 64 IN | HEART RATE: 92 BPM | SYSTOLIC BLOOD PRESSURE: 120 MMHG

## 2023-10-20 DIAGNOSIS — M16.12 PRIMARY OSTEOARTHRITIS OF LEFT HIP: ICD-10-CM

## 2023-10-20 DIAGNOSIS — G89.29 CHRONIC BILATERAL LOW BACK PAIN WITHOUT SCIATICA: ICD-10-CM

## 2023-10-20 DIAGNOSIS — E11.65 UNCONTROLLED TYPE 2 DIABETES MELLITUS WITH HYPERGLYCEMIA (HCC): ICD-10-CM

## 2023-10-20 DIAGNOSIS — R10.10 UPPER ABDOMINAL PAIN: ICD-10-CM

## 2023-10-20 DIAGNOSIS — Z23 NEED FOR VACCINATION: ICD-10-CM

## 2023-10-20 DIAGNOSIS — R20.0 BILATERAL LEG NUMBNESS: ICD-10-CM

## 2023-10-20 DIAGNOSIS — M54.50 CHRONIC BILATERAL LOW BACK PAIN WITHOUT SCIATICA: ICD-10-CM

## 2023-10-20 DIAGNOSIS — R10.13 DYSPEPSIA: ICD-10-CM

## 2023-10-20 DIAGNOSIS — K80.20 CALCULUS OF GALLBLADDER WITHOUT CHOLECYSTITIS WITHOUT OBSTRUCTION: ICD-10-CM

## 2023-10-20 PROCEDURE — 3078F DIAST BP <80 MM HG: CPT | Performed by: INTERNAL MEDICINE

## 2023-10-20 PROCEDURE — 3074F SYST BP LT 130 MM HG: CPT | Performed by: INTERNAL MEDICINE

## 2023-10-20 PROCEDURE — 90471 IMMUNIZATION ADMIN: CPT | Performed by: INTERNAL MEDICINE

## 2023-10-20 PROCEDURE — 90662 IIV NO PRSV INCREASED AG IM: CPT | Performed by: INTERNAL MEDICINE

## 2023-10-20 PROCEDURE — 99215 OFFICE O/P EST HI 40 MIN: CPT | Mod: 25 | Performed by: INTERNAL MEDICINE

## 2023-10-20 RX ORDER — OMEPRAZOLE 20 MG/1
20 CAPSULE, DELAYED RELEASE ORAL 2 TIMES DAILY
Qty: 180 CAPSULE | Refills: 1 | Status: SHIPPED | OUTPATIENT
Start: 2023-10-20 | End: 2023-11-10

## 2023-10-20 ASSESSMENT — FIBROSIS 4 INDEX: FIB4 SCORE: 0.88

## 2023-10-20 NOTE — PROGRESS NOTES
Established Patient    Domonique presents today with the following:    CC:  ER follow up, follow-up for diabetes    HPI:   Domonique is a 68 y.o. female who came in for above.     She has chronic epigastric pain that radiates to her left UQ and back. It got better after H pylori. Over the past 3 weeks, she is having nausea in AM and pain after eating in epigastrium that radiates to LUQ.  She went to ER 12 days ago due to abdominal pain.  CT abdomen showed gallstones without cholecystitis.  She has been referred to get cholecystectomy.    She could not sleep due to pain in back. She does have advanced DDD in lumbar. Pending MRI.  She is wondering what she needs to do to get her hip replaced.  Referral to orthopedics was placed in June and looks like there was an attempt to schedule from the office but it did not happen.    ROS:   As above    Patient Active Problem List    Diagnosis Date Noted    Calculus of gallbladder without cholecystitis without obstruction 10/20/2023    Type 2 diabetes mellitus with diabetic polyneuropathy, with long-term current use of insulin (HCC) 04/04/2023    Long-term insulin use (HCC) 04/04/2023    Chest pain 04/04/2023    Other headache syndrome 04/04/2023    Postmenopause 11/20/2020    History of ischemic stroke 09/15/2020    Low vitamin D level 09/15/2020    Oliguria 08/04/2020    Onychomycosis 08/04/2020    Intertrigo 08/04/2020    Other hemorrhoids 08/04/2020    Postural dizziness with presyncope 08/04/2020    Fall from ground level 08/04/2020    Other fatigue 08/04/2020    Snoring 08/04/2020    Hair loss 08/04/2020    Chronic constipation 08/04/2020    Chronic left hip pain 10/19/2015    Fibroid, uterine 06/12/2015    Hyperlipidemia with target LDL less than 70 06/12/2015    GERD (gastroesophageal reflux disease) 04/16/2015    Varicose veins 04/13/2015       Current Outpatient Medications   Medication Sig Dispense Refill    omeprazole (PRILOSEC) 20 MG delayed-release capsule Take 1  "Capsule by mouth 2 times a day. Take 30 mins prior to a meal 180 Capsule 1    SITagliptin (JANUVIA) 50 MG Tab Take 1 Tablet by mouth every day. 30 Tablet 3    BD PEN NEEDLE ROB 2ND GEN USE 1 PEN NEEDLE EVERY  Each 3    Lancets Lancets order: Lancets for Accucheck Guide meter. Sig: use once daily and prn ssx high or low sugar. #100 RF x 3 100 Each 3    ondansetron (ZOFRAN ODT) 4 MG TABLET DISPERSIBLE Take 1 Tablet by mouth every 6 hours as needed for Nausea/Vomiting. 20 Tablet 0    JARDIANCE 25 MG Tab TAKE 25 MG BY MOUTH EVERY DAY. 90 Tablet 3    ACCU-CHEK GUIDE strip USE TWICE DAILY AND AS NEEDED FOR HIGH OR LOW BLOOD SUGAR 100 Strip 3    BIOTIN PO Take 1 Tablet by mouth every day.      Cyanocobalamin (B-12 PO) Take 1 Tablet by mouth every day.      miconazole (MICOTIN) 2 % Cream Apply 1 Application. topically 2 times a day as needed (Apply on feet rash and groin rash).      diphenhydrAMINE HCl (ALLERGY MED PO) Take 2 Tablets by mouth as needed (For allergies). OTC, not sure the name or strength      Insulin Degludec 200 UNIT/ML Solution Pen-injector Inject 25 Units under the skin every day. 15 mL 5    ergocalciferol (DRISDOL) 31593 UNIT capsule Take 1 Capsule by mouth every 7 days. 12 Capsule 3    metFORMIN (GLUCOPHAGE) 500 MG Tab Take 1 Tablet by mouth 2 times a day with meals. 180 Tablet 3    rosuvastatin (CRESTOR) 5 MG Tab Take 1 Tablet by mouth every evening. 90 Tablet 3    aspirin (ASA) 81 MG Chew Tab chewable tablet Chew 1 Tablet every day. 90 Tablet 3    Blood Glucose Test Strips Test strips order: Test strips for Accucheck guide meter. Sig: use twice daily and prn ssx high or low sugar #100 RF x 3 (may exchange from brand strip compatible with her machine) 100 Each 3     No current facility-administered medications for this visit.         /66 (BP Location: Left arm, Patient Position: Sitting, BP Cuff Size: Adult)   Pulse 92   Temp (!) 35.8 °C (96.4 °F) (Temporal)   Ht 1.626 m (5' 4\")   Wt " 78 kg (171 lb 14.4 oz)   SpO2 94%   BMI 29.51 kg/m²     Physical Exam  General: Alert and oriented, No apparent distress.  Neck: Supple. No cervical or supraclavicular lymphadenopathy noted. Thyroid not enlarged.  Lungs: Clear to auscultation bilaterally without any wheezing, crepitations.  Cardiovascular: Regular rate and rhythm. No murmurs, rubs or gallops.  Abdomen: Bowel sound +, soft, non tender, no rebound or guarding, no palpable organomegaly  Extremities: No edema.      Assessment and Plan    1. Upper abdominal pain  2. Dyspepsia  3. Calculus of gallbladder without cholecystitis without obstruction  She had similar intermittent pain for 2 years which got better with H. pylori treatment significantly then recurred.  I believe Rybelsus could be contributing and possibly recurrent H pylori.  The nature of pain is not consistent with biliary colic.  Advised to postpone cholecystectomy at least by 2 months so that I can modify her medications to improve gastritis. If pain is improving by 6 weeks, she does not need gallbladder removal.  These gallstones are present since 2020 on last CT scan.   Ultimately, it is her decision to whether proceed with cholecystectomy or not. Pros and cons of observing and surgical option is discussed. But she is agreeable with postponing surgery after discussion today.  - H.PYLORI STOOL ANTIGEN; Future  - Increase omeprazole (PRILOSEC) 20 MG delayed-release capsule; Take 1 Capsule by mouth 2 times a day. Take 30 mins prior to a meal  Dispense: 180 Capsule; Refill: 1  - Hold aspirin for 6 weeks.    4. Uncontrolled type 2 diabetes mellitus with hyperglycemia (HCC)  Stop Rybelsus.  Diabetes is not controlled.  She will need additional medication to replace rybelsus.  - start SITagliptin (JANUVIA) 50 MG Tab; Take 1 Tablet by mouth every day.  Dispense: 30 Tablet; Refill: 3  - POCT Retinal Eye Exam    5. Primary osteoarthritis of left hip  Given info for [facility]. Daughter will call.    6.  Need for vaccination  - INFLUENZA VACCINE, HIGH DOSE (65+ ONLY)    7. Chronic bilateral low back pain without sciatica  8. Bilateral leg numbness  Given info to schedule.      Return in about 6 weeks (around 12/1/2023).           Signed by: Mena Christensen M.D.

## 2023-10-23 ENCOUNTER — HOSPITAL ENCOUNTER (OUTPATIENT)
Facility: MEDICAL CENTER | Age: 68
End: 2023-10-23
Attending: INTERNAL MEDICINE
Payer: COMMERCIAL

## 2023-10-23 DIAGNOSIS — R10.13 DYSPEPSIA: ICD-10-CM

## 2023-10-23 LAB — H PYLORI AG STL QL IA: DETECTED

## 2023-10-23 PROCEDURE — 87338 HPYLORI STOOL AG IA: CPT

## 2023-10-24 DIAGNOSIS — A04.8 H. PYLORI INFECTION: ICD-10-CM

## 2023-10-24 RX ORDER — BISMUTH SUBSALICYLATE 525 MG
1 TABLET ORAL 4 TIMES DAILY
Qty: 56 TABLET | Refills: 0 | Status: SHIPPED | OUTPATIENT
Start: 2023-10-24 | End: 2023-11-07

## 2023-10-24 RX ORDER — DOXYCYCLINE HYCLATE 100 MG
100 TABLET ORAL 2 TIMES DAILY
Qty: 28 TABLET | Refills: 0 | Status: SHIPPED | OUTPATIENT
Start: 2023-10-24 | End: 2023-11-07

## 2023-10-24 RX ORDER — METRONIDAZOLE 500 MG/1
500 TABLET ORAL EVERY 8 HOURS
Qty: 42 TABLET | Refills: 0 | Status: SHIPPED | OUTPATIENT
Start: 2023-10-24 | End: 2023-11-07

## 2023-10-24 NOTE — RESULT ENCOUNTER NOTE
Called patient and relayed what Dr. Christensen said in her note. Informed patient to call back with any questions.

## 2023-11-01 PROBLEM — M16.12 PRIMARY OSTEOARTHRITIS OF LEFT HIP: Status: ACTIVE | Noted: 2023-11-01

## 2023-11-02 ASSESSMENT — FIBROSIS 4 INDEX: FIB4 SCORE: 0.88

## 2023-11-07 ENCOUNTER — OFFICE VISIT (OUTPATIENT)
Dept: MEDICAL GROUP | Facility: MEDICAL CENTER | Age: 68
End: 2023-11-07
Payer: COMMERCIAL

## 2023-11-07 VITALS
DIASTOLIC BLOOD PRESSURE: 84 MMHG | RESPIRATION RATE: 16 BRPM | HEART RATE: 94 BPM | WEIGHT: 169 LBS | OXYGEN SATURATION: 95 % | TEMPERATURE: 97.5 F | SYSTOLIC BLOOD PRESSURE: 136 MMHG | BODY MASS INDEX: 29.95 KG/M2 | HEIGHT: 63 IN

## 2023-11-07 DIAGNOSIS — A04.8 H. PYLORI INFECTION: ICD-10-CM

## 2023-11-07 DIAGNOSIS — L03.031 ACUTE PARONYCHIA OF TOE OF RIGHT FOOT: ICD-10-CM

## 2023-11-07 DIAGNOSIS — M16.12 PRIMARY OSTEOARTHRITIS OF LEFT HIP: ICD-10-CM

## 2023-11-07 DIAGNOSIS — E11.65 UNCONTROLLED TYPE 2 DIABETES MELLITUS WITH HYPERGLYCEMIA (HCC): ICD-10-CM

## 2023-11-07 PROCEDURE — 3075F SYST BP GE 130 - 139MM HG: CPT | Performed by: INTERNAL MEDICINE

## 2023-11-07 PROCEDURE — 3079F DIAST BP 80-89 MM HG: CPT | Performed by: INTERNAL MEDICINE

## 2023-11-07 PROCEDURE — 99214 OFFICE O/P EST MOD 30 MIN: CPT | Performed by: INTERNAL MEDICINE

## 2023-11-07 RX ORDER — PEN NEEDLE, DIABETIC 32GX 5/32"
NEEDLE, DISPOSABLE MISCELLANEOUS
Qty: 100 EACH | Refills: 3 | Status: SHIPPED | OUTPATIENT
Start: 2023-11-07

## 2023-11-07 RX ORDER — AMOXICILLIN AND CLAVULANATE POTASSIUM 875; 125 MG/1; MG/1
1 TABLET, FILM COATED ORAL 2 TIMES DAILY
Qty: 10 TABLET | Refills: 0 | Status: SHIPPED | OUTPATIENT
Start: 2023-11-07 | End: 2023-11-10

## 2023-11-07 ASSESSMENT — FIBROSIS 4 INDEX: FIB4 SCORE: 0.88

## 2023-11-08 NOTE — PROGRESS NOTES
Established Patient    Domonique presents today with the following:    CC: Right big toenail infection    HPI:   Domonique is a 68 y.o. female who came in for above.    She had right toenail paronychia in last December.  It got better with antibiotic.  It was slowly coming back in the last 2 months.  She had an episode of fever which resolved. She popped the pus, now it's getting better.    She is actually on antibiotics, doxycycline and metronidazole for H. pylori infection.  Tomorrow is her last day.  She is getting nausea and dizziness on metronidazole.  Abdominal pain is getting better.      ROS:   As above    Patient Active Problem List    Diagnosis Date Noted    Primary osteoarthritis of left hip 11/01/2023    Calculus of gallbladder without cholecystitis without obstruction 10/20/2023    Type 2 diabetes mellitus with diabetic polyneuropathy, with long-term current use of insulin (HCC) 04/04/2023    Long-term insulin use (HCC) 04/04/2023    Chest pain 04/04/2023    Other headache syndrome 04/04/2023    Postmenopause 11/20/2020    History of ischemic stroke 09/15/2020    Low vitamin D level 09/15/2020    Oliguria 08/04/2020    Onychomycosis 08/04/2020    Intertrigo 08/04/2020    Other hemorrhoids 08/04/2020    Postural dizziness with presyncope 08/04/2020    Fall from ground level 08/04/2020    Other fatigue 08/04/2020    Snoring 08/04/2020    Hair loss 08/04/2020    Chronic constipation 08/04/2020    Chronic left hip pain 10/19/2015    Fibroid, uterine 06/12/2015    Hyperlipidemia with target LDL less than 70 06/12/2015    GERD (gastroesophageal reflux disease) 04/16/2015    Varicose veins 04/13/2015       Current Outpatient Medications   Medication Sig Dispense Refill    amoxicillin-clavulanate (AUGMENTIN) 875-125 MG Tab Take 1 Tablet by mouth 2 times a day for 5 days. 10 Tablet 0    SITagliptin (JANUVIA) 50 MG Tab Take 1 Tablet by mouth every day. 30 Tablet 3    Insulin Pen Needle 32 G x 4 mm (BD PEN  NEEDLE ROB 2ND GEN) USE 1 PEN NEEDLE EVERY  Each 3    Bismuth Subsalicylate (PEPTO BISMOL ULTRA) 525 MG Tab Take 1 Tablet by mouth 4 times a day for 14 days. 56 Tablet 0    doxycycline (VIBRAMYCIN) 100 MG Tab Take 1 Tablet by mouth 2 times a day for 14 days. 28 Tablet 0    metroNIDAZOLE (FLAGYL) 500 MG Tab Take 1 Tablet by mouth every 8 hours for 14 days. 42 Tablet 0    omeprazole (PRILOSEC) 20 MG delayed-release capsule Take 1 Capsule by mouth 2 times a day. Take 30 mins prior to a meal 180 Capsule 1    ondansetron (ZOFRAN ODT) 4 MG TABLET DISPERSIBLE Take 1 Tablet by mouth every 6 hours as needed for Nausea/Vomiting. 20 Tablet 0    JARDIANCE 25 MG Tab TAKE 25 MG BY MOUTH EVERY DAY. 90 Tablet 3    ACCU-CHEK GUIDE strip USE TWICE DAILY AND AS NEEDED FOR HIGH OR LOW BLOOD SUGAR 100 Strip 3    Lancets Lancets order: Lancets for Accucheck Guide meter. Sig: use once daily and prn ssx high or low sugar. #100 RF x 3 100 Each 3    BIOTIN PO Take 1 Tablet by mouth every day.      miconazole (MICOTIN) 2 % Cream Apply 1 Application. topically 2 times a day as needed (Apply on feet rash and groin rash).      Insulin Degludec 200 UNIT/ML Solution Pen-injector Inject 25 Units under the skin every day. 15 mL 5    ergocalciferol (DRISDOL) 24454 UNIT capsule Take 1 Capsule by mouth every 7 days. 12 Capsule 3    metFORMIN (GLUCOPHAGE) 500 MG Tab Take 1 Tablet by mouth 2 times a day with meals. 180 Tablet 3    rosuvastatin (CRESTOR) 5 MG Tab Take 1 Tablet by mouth every evening. 90 Tablet 3    Blood Glucose Test Strips Test strips order: Test strips for Accucheck guide meter. Sig: use twice daily and prn ssx high or low sugar #100 RF x 3 (may exchange from brand strip compatible with her machine) 100 Each 3     No current facility-administered medications for this visit.         /84 (BP Location: Left arm, Patient Position: Sitting, BP Cuff Size: Adult)   Pulse 94   Temp 36.4 °C (97.5 °F)   Resp 16   Ht 1.6 m (5'  "3\")   Wt 76.7 kg (169 lb)   SpO2 95%   BMI 29.94 kg/m²     Physical Exam  General: Alert and oriented, No apparent distress.      Extremities: No edema.      Assessment and Plan    1. Acute paronychia of toe of right foot  Slight local edema without any purulent today. Drying up. Likely already getting better with soaking and after draining the pus. Okay to observe for a few more days without additional antibiotics.  If it is getting worse, start Augmentin.  - amoxicillin-clavulanate (AUGMENTIN) 875-125 MG Tab; Take 1 Tablet by mouth 2 times a day for 5 days.  Dispense: 10 Tablet; Refill: 0    2. H. pylori infection  -Recheck H.PYLORI STOOL ANTIGEN; Future in 2 weeks.    3. Uncontrolled type 2 diabetes mellitus with hyperglycemia (HCC)  She has not started Januvia because of too many medications and nausea from antibiotics.   SHe is planning to have left hip arthroplasty.  Goal A1c was recommended last 7.5 before surgery by ortho.  Discussed a more rapid titration of diabetes medication by using finger stick glucose. FBS <140, 2hr postprandial <180.  If not at target after starting Januvia, follow-up sooner.  Recommended to continue glargine 12 units daily, metformin and Jardiance.  - Comp Metabolic Panel; Future  - MICROALBUMIN CREAT RATIO URINE; Future  - HEMOGLOBIN A1C; Future  - Lipid Profile; Future  - SITagliptin (JANUVIA) 50 MG Tab; Take 1 Tablet by mouth every day.  Dispense: 30 Tablet; Refill: 3  - Insulin Pen Needle 32 G x 4 mm (BD PEN NEEDLE ROB 2ND GEN); USE 1 PEN NEEDLE EVERY DAY  Dispense: 100 Each; Refill: 3    4. Primary osteoarthritis of left hip  F/b ortho.      Return in about 3 months (around 1/25/2024).      Signed by: Mena Christensen M.D.    "

## 2023-11-15 ENCOUNTER — PRE-ADMISSION TESTING (OUTPATIENT)
Dept: ADMISSIONS | Facility: MEDICAL CENTER | Age: 68
End: 2023-11-15
Attending: ORTHOPAEDIC SURGERY
Payer: COMMERCIAL

## 2023-11-27 ENCOUNTER — HOSPITAL ENCOUNTER (OUTPATIENT)
Facility: MEDICAL CENTER | Age: 68
End: 2023-11-27
Attending: INTERNAL MEDICINE | Admitting: ORTHOPAEDIC SURGERY
Payer: COMMERCIAL

## 2023-11-27 ENCOUNTER — PRE-ADMISSION TESTING (OUTPATIENT)
Dept: ADMISSIONS | Facility: MEDICAL CENTER | Age: 68
End: 2023-11-27
Attending: ORTHOPAEDIC SURGERY
Payer: COMMERCIAL

## 2023-11-27 DIAGNOSIS — A04.8 H. PYLORI INFECTION: ICD-10-CM

## 2023-11-27 DIAGNOSIS — Z01.812 PRE-OPERATIVE LABORATORY EXAMINATION: ICD-10-CM

## 2023-11-27 LAB — H PYLORI AG STL QL IA: DETECTED

## 2023-11-27 PROCEDURE — 87338 HPYLORI STOOL AG IA: CPT

## 2023-11-27 RX ORDER — OMEPRAZOLE 20 MG/1
20 CAPSULE, DELAYED RELEASE ORAL 2 TIMES DAILY
COMMUNITY
End: 2023-11-29 | Stop reason: SDUPTHER

## 2023-11-27 NOTE — PREPROCEDURE INSTRUCTIONS
Pre-admit telephone appointment completed. Reviewed the Preparing for your procedure handout with patient over the phone. Patient instructed per pharmacy guidelines regarding taking, holding, or contacting provider for instructions on regularly prescribed medications before surgery. Instructed to take the following medications the day of surgery with a sip of water per pharmacy medication guidelines: Omeprazole.    Denies anesthesia complications

## 2023-11-29 ENCOUNTER — PRE-ADMISSION TESTING (OUTPATIENT)
Dept: ADMISSIONS | Facility: MEDICAL CENTER | Age: 68
End: 2023-11-29
Attending: ORTHOPAEDIC SURGERY
Payer: COMMERCIAL

## 2023-11-29 DIAGNOSIS — A04.8 H. PYLORI INFECTION: ICD-10-CM

## 2023-11-29 DIAGNOSIS — M16.12 PRIMARY OSTEOARTHRITIS OF LEFT HIP: ICD-10-CM

## 2023-11-29 DIAGNOSIS — Z01.812 PRE-OPERATIVE LABORATORY EXAMINATION: ICD-10-CM

## 2023-11-29 LAB
ABO GROUP BLD: NORMAL
APPEARANCE UR: CLEAR
APTT PPP: 32.7 SEC (ref 24.7–36)
BILIRUB UR QL STRIP.AUTO: NEGATIVE
COLOR UR: YELLOW
GLUCOSE UR STRIP.AUTO-MCNC: >=1000 MG/DL
HIV 1+2 AB+HIV1 P24 AG SERPL QL IA: NORMAL
INR PPP: 0.96 (ref 0.87–1.13)
KETONES UR STRIP.AUTO-MCNC: ABNORMAL MG/DL
LEUKOCYTE ESTERASE UR QL STRIP.AUTO: NEGATIVE
MICRO URNS: ABNORMAL
NITRITE UR QL STRIP.AUTO: NEGATIVE
PH UR STRIP.AUTO: 5.5 [PH] (ref 5–8)
PROT UR QL STRIP: NEGATIVE MG/DL
PROTHROMBIN TIME: 13.3 SEC (ref 12–14.6)
RBC UR QL AUTO: NEGATIVE
RH BLD: NORMAL
SP GR UR STRIP.AUTO: 1.02

## 2023-11-29 PROCEDURE — 81003 URINALYSIS AUTO W/O SCOPE: CPT

## 2023-11-29 PROCEDURE — 87640 STAPH A DNA AMP PROBE: CPT

## 2023-11-29 PROCEDURE — 86901 BLOOD TYPING SEROLOGIC RH(D): CPT

## 2023-11-29 PROCEDURE — 87389 HIV-1 AG W/HIV-1&-2 AB AG IA: CPT

## 2023-11-29 PROCEDURE — 86900 BLOOD TYPING SEROLOGIC ABO: CPT

## 2023-11-29 PROCEDURE — 85730 THROMBOPLASTIN TIME PARTIAL: CPT

## 2023-11-29 PROCEDURE — 36415 COLL VENOUS BLD VENIPUNCTURE: CPT

## 2023-11-29 PROCEDURE — 85610 PROTHROMBIN TIME: CPT

## 2023-11-29 PROCEDURE — 87641 MR-STAPH DNA AMP PROBE: CPT

## 2023-11-29 RX ORDER — CLARITHROMYCIN 500 MG/1
500 TABLET, COATED ORAL 2 TIMES DAILY
Qty: 28 TABLET | Refills: 0 | Status: SHIPPED | OUTPATIENT
Start: 2023-11-29 | End: 2023-12-13

## 2023-11-29 RX ORDER — AMOXICILLIN 500 MG/1
1000 CAPSULE ORAL 2 TIMES DAILY
Qty: 56 CAPSULE | Refills: 0 | Status: SHIPPED | OUTPATIENT
Start: 2023-11-29 | End: 2023-12-13

## 2023-11-29 RX ORDER — OMEPRAZOLE 20 MG/1
20 CAPSULE, DELAYED RELEASE ORAL 2 TIMES DAILY
Qty: 60 CAPSULE | Refills: 2 | Status: SHIPPED | OUTPATIENT
Start: 2023-11-29

## 2023-11-29 NOTE — DISCHARGE PLANNING
DISCHARGE PLANNING NOTE - TOTAL JOINT    Procedure: Procedure(s):  LEFT TOTAL HIP ARTHROPLASTY ANTERIOR APPROACH  Procedure Date: 12/12/2023  Insurance: Payor: [employer] / Plan: [place] BCBS / Product Type: *No Product type* /    Equipment currently available at home?  cane and ice.  Steps into the home? 0  Steps within the home? 0  Toilet height? Standard  Type of shower? walk-in shower  Home Oxygen? No  Portable tank?    Oxygen Provider:  Who will be with you during your recovery? spouse  Is Outpatient Physical Therapy set up after surgery? No   Did you take the Total Joint Class and where? Yes, received [facility] book.  Planning same day discharge?Yes     This writer met with pt and educated to preop showers, nasal mrsa swab which was obtained by this writer, and potential for overnight stay. Interview conducted with  Victoriano Ward, [id number]. CHG kit given to pt. Home safety checklist and equipment resource guide sent home with pt. All materials are in the Nauruan version. Pt educated to dc criteria. All questions answered and verbalizes understanding of all instructions. No dc needs identified at this time. Anticipate dc to home without barriers.

## 2023-11-30 LAB
SCCMEC + MECA PNL NOSE NAA+PROBE: NEGATIVE
SCCMEC + MECA PNL NOSE NAA+PROBE: NEGATIVE

## 2023-12-04 ENCOUNTER — TELEPHONE (OUTPATIENT)
Dept: MEDICAL GROUP | Facility: MEDICAL CENTER | Age: 68
End: 2023-12-04

## 2023-12-04 NOTE — TELEPHONE ENCOUNTER
I called the patient to let her know about the results and she refuse to take the medication because she has a hip  surgery 12/12/23 she is asking if she can take her insulin please let me know and I can call her back

## 2023-12-05 NOTE — TELEPHONE ENCOUNTER
She can pick meds up and start after she recovers from surgery because H pylori is not urgent. If she doesn’t take it at all, her stomach will get upset again within the next few months. For her insulin, she can take half the dose when she is fasting for surgery. Hold Jardiance and Januvia on the day of surgery. The rest she can continue.

## 2023-12-07 ENCOUNTER — TELEPHONE (OUTPATIENT)
Dept: MEDICAL GROUP | Facility: MEDICAL CENTER | Age: 68
End: 2023-12-07

## 2023-12-07 NOTE — TELEPHONE ENCOUNTER
December 4, 2023  Me         12/4/23 10:24 AM  Note      I called the patient to let her know about the results and she refuse to take the medication because she has a hip  surgery 12/12/23 she is asking if she can take her insulin please let me know and I can call her back

## 2023-12-07 NOTE — TELEPHONE ENCOUNTER
----- Message from Aaron Hair Ass't sent at 11/30/2023  8:37 AM PST -----  Did you call this patient already? Just asking so I can make a note.   ----- Message -----  From: Mnea Christensen M.D.  Sent: 11/29/2023  11:13 AM PST  To: So August Garcia,  Please call this Romanian speaking patient. Her H pylori did not go away with the first round of antibiotics. She needs another round with different antibiotics for 2 weeks and recheck stool in 2 months. I sent clarithromycin, amoxicillin and omeprazole to her pharmacy at this time.  Dr Christensen

## 2023-12-07 NOTE — RESULT ENCOUNTER NOTE
December 4, 2023  Natacha CHISHOLM    12/4/23 10:24 AM  Note      I called the patient to let her know about the results and she refuse to take the medication because she has a hip  surgery 12/12/23 she is asking if she can take her insulin please let me know and I can call her back

## 2023-12-07 NOTE — RESULT ENCOUNTER NOTE
December 4, 2023  Natacha Smith     12/4/23 10:24 AM  Note      I called the patient to let her know about the results and she refuse to take the medication because she has a hip  surgery 12/12/23 she is asking if she can take her insulin please let me know and I can call her back

## 2023-12-07 NOTE — TELEPHONE ENCOUNTER
----- Message from aAron Hair Ass't sent at 11/30/2023  8:37 AM PST -----  Did you call this patient already? Just asking so I can make a note.   ----- Message -----  From: Mena Christensen M.D.  Sent: 11/29/2023  11:13 AM PST  To: So August Garcia,  Please call this Czech speaking patient. Her H pylori did not go away with the first round of antibiotics. She needs another round with different antibiotics for 2 weeks and recheck stool in 2 months. I sent clarithromycin, amoxicillin and omeprazole to her pharmacy at this time.  Dr Christensen

## 2023-12-08 ENCOUNTER — TELEPHONE (OUTPATIENT)
Dept: MEDICAL GROUP | Facility: MEDICAL CENTER | Age: 68
End: 2023-12-08
Payer: COMMERCIAL

## 2023-12-08 NOTE — TELEPHONE ENCOUNTER
PATIENT INFORM ----- Message from Mena Christensen M.D. sent at 11/29/2023 11:13 AM PST -----  Vahid Garcia,  Please call this Wolof speaking patient. Her H pylori did not go away with the first round of antibiotics. She needs another round with different antibiotics for 2 weeks and recheck stool in 2 months. I sent clarithromycin, amoxicillin and omeprazole to her pharmacy at this time.  Dr Christensen

## 2023-12-08 NOTE — TELEPHONE ENCOUNTER
She can take half dose of the insulin on the day of the surgery.  She needs to skip Jardiance and Januvia (Sitagliptin) on the day of surgery.  The rest of the medications can be continued.

## 2023-12-11 ENCOUNTER — ANESTHESIA EVENT (OUTPATIENT)
Dept: SURGERY | Facility: MEDICAL CENTER | Age: 68
End: 2023-12-11
Payer: COMMERCIAL

## 2023-12-11 NOTE — H&P
Surgery Orthopedic History & Physical Note    Date  12/10/2023    Primary Care Physician  Mena Christensen M.D.      Pre-Op Diagnosis Codes:     * Primary osteoarthritis of left hip [M16.12]    HPI  This is a 68 y.o. female who presented with  left hip pain.  Her daughter translates here for her today.    This pain has been present for 10 years, worsening over the last 5 years.    She is having difficulty and pain with weight bearing, activities, and it is interfering with their ADLs.    She uses over-the-counter medications as needed for pain.  She has been using a cane for the last 5 years as the limp has gotten more pronounced.  She localizes her pain to a C-shaped distribution around the hip that radiates down to the knee.         Pain Assessment  Pain Assessment: 0-10  Pain Score: 8    Past Medical History:   Diagnosis Date    Bowel habit changes     occasionally    Diabetic peripheral neuropathy (HCC) 08/04/2020    Hyperlipidemia LDL goal < 70 06/12/2015    Type II or unspecified type diabetes mellitus without mention of complication, uncontrolled 04/13/2015    Urinary incontinence     sometimes at night       Past Surgical History:   Procedure Laterality Date    APPENDECTOMY         No current facility-administered medications for this encounter.     Current Outpatient Medications   Medication Sig Dispense Refill    omeprazole (PRILOSEC) 20 MG delayed-release capsule Take 1 Capsule by mouth 2 times a day. 60 Capsule 2    clarithromycin (BIAXIN) 500 MG Tab Take 1 Tablet by mouth 2 times a day for 14 days. 28 Tablet 0    amoxicillin (AMOXIL) 500 MG Cap Take 2 Capsules by mouth 2 times a day for 14 days. 56 Capsule 0    Naproxen Sodium (FLANAX PAIN RELIEF PO) Take  by mouth as needed.      SITagliptin (JANUVIA) 50 MG Tab Take 1 Tablet by mouth every day. 30 Tablet 3    Insulin Pen Needle 32 G x 4 mm (BD PEN NEEDLE ROB 2ND GEN) USE 1 PEN NEEDLE EVERY  Each 3    JARDIANCE 25 MG Tab TAKE 25 MG BY MOUTH EVERY  DAY. 90 Tablet 3    ACCU-CHEK GUIDE strip USE TWICE DAILY AND AS NEEDED FOR HIGH OR LOW BLOOD SUGAR 100 Strip 3    Lancets Lancets order: Lancets for Accucheck Guide meter. Sig: use once daily and prn ssx high or low sugar. #100 RF x 3 100 Each 3    Insulin Degludec 200 UNIT/ML Solution Pen-injector Inject 25 Units under the skin every day. 15 mL 5    ergocalciferol (DRISDOL) 63654 UNIT capsule Take 1 Capsule by mouth every 7 days. 12 Capsule 3    metFORMIN (GLUCOPHAGE) 500 MG Tab Take 1 Tablet by mouth 2 times a day with meals. 180 Tablet 3    rosuvastatin (CRESTOR) 5 MG Tab Take 1 Tablet by mouth every evening. 90 Tablet 3    Blood Glucose Test Strips Test strips order: Test strips for Accucheck guide meter. Sig: use twice daily and prn ssx high or low sugar #100 RF x 3 (may exchange from brand strip compatible with her machine) 100 Each 3       Social History     Occupational History    Not on file   Tobacco Use    Smoking status: Never    Smokeless tobacco: Never   Vaping Use    Vaping Use: Never used   Substance and Sexual Activity    Alcohol use: No    Drug use: No    Sexual activity: Yes     Partners: Male     Comment:  x 36       Family History   Problem Relation Age of Onset    Diabetes Mother     Diabetes Sister     Diabetes Maternal Grandmother        Allergies  Patient has no known allergies.    Review of Systems  Pertinent ROS in HPI. Other ROS reviewed and found to be otherwise unremarkable.       Physical Exam  Cardiovascular:      Pulses: Normal pulses.   Pulmonary:      Effort: Pulmonary effort is normal.     Left hip: Patient is markedly shorter on the left leg compared to the right.  She really does not tolerate much in the way of range of motion in any direction due to the significant pain.  Walks with a very antalgic gait using a cane.    Radiology:  Three-view x-rays of the left hip to include AP pelvis, AP and crosstable   lateral show severe bone-on-bone degenerative changes with  collapse of the   femoral head and superior migration.      Assessment/Plan:  Pre-Op Diagnosis Codes:     * Primary osteoarthritis of left hip [M16.12]  Procedure(s):  LEFT TOTAL HIP ARTHROPLASTY ANTERIOR APPROACH    There is not much in the way of conservative treatment that will help with this deformity.  She is indicated for a left total hip arthroplasty.  Risk, benefits, and alternatives of this were discussed.     She is a type II diabetic with a current A1c of 8.4.    They do report her diabetic medications have recently been changed.    We will ask for primary care clearance with this and can proceed with total hip arthroplasty when her A1c is 7.5 or less.    Patient has been educated about choice for planned prosthesis and rationale for its use.  Patient understands and wishes to proceed.

## 2023-12-12 ENCOUNTER — HOSPITAL ENCOUNTER (OUTPATIENT)
Facility: MEDICAL CENTER | Age: 68
End: 2023-12-13
Attending: ORTHOPAEDIC SURGERY | Admitting: ORTHOPAEDIC SURGERY
Payer: COMMERCIAL

## 2023-12-12 ENCOUNTER — ANESTHESIA (OUTPATIENT)
Dept: SURGERY | Facility: MEDICAL CENTER | Age: 68
End: 2023-12-12
Payer: COMMERCIAL

## 2023-12-12 ENCOUNTER — APPOINTMENT (OUTPATIENT)
Dept: RADIOLOGY | Facility: MEDICAL CENTER | Age: 68
End: 2023-12-12
Attending: ORTHOPAEDIC SURGERY
Payer: COMMERCIAL

## 2023-12-12 DIAGNOSIS — Z01.812 PRE-OPERATIVE LABORATORY EXAMINATION: ICD-10-CM

## 2023-12-12 DIAGNOSIS — Z96.642 STATUS POST LEFT HIP REPLACEMENT: ICD-10-CM

## 2023-12-12 LAB
ABO GROUP BLD: NORMAL
BLD GP AB SCN SERPL QL: NORMAL
GLUCOSE BLD STRIP.AUTO-MCNC: 131 MG/DL (ref 65–99)
RH BLD: NORMAL

## 2023-12-12 PROCEDURE — 27130 TOTAL HIP ARTHROPLASTY: CPT | Mod: ASROC,LT | Performed by: PHYSICIAN ASSISTANT

## 2023-12-12 PROCEDURE — 160009 HCHG ANES TIME/MIN: Performed by: ORTHOPAEDIC SURGERY

## 2023-12-12 PROCEDURE — 160031 HCHG SURGERY MINUTES - 1ST 30 MINS LEVEL 5: Performed by: ORTHOPAEDIC SURGERY

## 2023-12-12 PROCEDURE — 82962 GLUCOSE BLOOD TEST: CPT

## 2023-12-12 PROCEDURE — A9270 NON-COVERED ITEM OR SERVICE: HCPCS | Performed by: ORTHOPAEDIC SURGERY

## 2023-12-12 PROCEDURE — 700102 HCHG RX REV CODE 250 W/ 637 OVERRIDE(OP): Performed by: PHYSICIAN ASSISTANT

## 2023-12-12 PROCEDURE — C1713 ANCHOR/SCREW BN/BN,TIS/BN: HCPCS | Performed by: ORTHOPAEDIC SURGERY

## 2023-12-12 PROCEDURE — 770030 HCHG ROOM/CARE - EXTENDED RECOVERY EACH 15 MIN

## 2023-12-12 PROCEDURE — 700105 HCHG RX REV CODE 258: Performed by: PHYSICIAN ASSISTANT

## 2023-12-12 PROCEDURE — C1776 JOINT DEVICE (IMPLANTABLE): HCPCS | Performed by: ORTHOPAEDIC SURGERY

## 2023-12-12 PROCEDURE — 160048 HCHG OR STATISTICAL LEVEL 1-5: Performed by: ORTHOPAEDIC SURGERY

## 2023-12-12 PROCEDURE — 86850 RBC ANTIBODY SCREEN: CPT

## 2023-12-12 PROCEDURE — 700102 HCHG RX REV CODE 250 W/ 637 OVERRIDE(OP): Performed by: ORTHOPAEDIC SURGERY

## 2023-12-12 PROCEDURE — 36415 COLL VENOUS BLD VENIPUNCTURE: CPT

## 2023-12-12 PROCEDURE — 160002 HCHG RECOVERY MINUTES (STAT): Performed by: ORTHOPAEDIC SURGERY

## 2023-12-12 PROCEDURE — 160036 HCHG PACU - EA ADDL 30 MINS PHASE I: Performed by: ORTHOPAEDIC SURGERY

## 2023-12-12 PROCEDURE — 700101 HCHG RX REV CODE 250: Performed by: ORTHOPAEDIC SURGERY

## 2023-12-12 PROCEDURE — 700101 HCHG RX REV CODE 250: Performed by: ANESTHESIOLOGY

## 2023-12-12 PROCEDURE — 700111 HCHG RX REV CODE 636 W/ 250 OVERRIDE (IP): Mod: JZ | Performed by: ANESTHESIOLOGY

## 2023-12-12 PROCEDURE — 27130 TOTAL HIP ARTHROPLASTY: CPT | Mod: LT | Performed by: ORTHOPAEDIC SURGERY

## 2023-12-12 PROCEDURE — 160042 HCHG SURGERY MINUTES - EA ADDL 1 MIN LEVEL 5: Performed by: ORTHOPAEDIC SURGERY

## 2023-12-12 PROCEDURE — 94760 N-INVAS EAR/PLS OXIMETRY 1: CPT

## 2023-12-12 PROCEDURE — 160035 HCHG PACU - 1ST 60 MINS PHASE I: Performed by: ORTHOPAEDIC SURGERY

## 2023-12-12 PROCEDURE — A9270 NON-COVERED ITEM OR SERVICE: HCPCS | Performed by: ANESTHESIOLOGY

## 2023-12-12 PROCEDURE — 700111 HCHG RX REV CODE 636 W/ 250 OVERRIDE (IP): Performed by: ANESTHESIOLOGY

## 2023-12-12 PROCEDURE — 700105 HCHG RX REV CODE 258: Performed by: ORTHOPAEDIC SURGERY

## 2023-12-12 PROCEDURE — 86900 BLOOD TYPING SEROLOGIC ABO: CPT

## 2023-12-12 PROCEDURE — A9270 NON-COVERED ITEM OR SERVICE: HCPCS | Performed by: PHYSICIAN ASSISTANT

## 2023-12-12 PROCEDURE — 700111 HCHG RX REV CODE 636 W/ 250 OVERRIDE (IP): Mod: JZ | Performed by: ORTHOPAEDIC SURGERY

## 2023-12-12 PROCEDURE — 700111 HCHG RX REV CODE 636 W/ 250 OVERRIDE (IP): Performed by: PHYSICIAN ASSISTANT

## 2023-12-12 PROCEDURE — 73502 X-RAY EXAM HIP UNI 2-3 VIEWS: CPT | Mod: LT

## 2023-12-12 PROCEDURE — 700102 HCHG RX REV CODE 250 W/ 637 OVERRIDE(OP): Performed by: ANESTHESIOLOGY

## 2023-12-12 PROCEDURE — 86901 BLOOD TYPING SEROLOGIC RH(D): CPT

## 2023-12-12 DEVICE — IMPLANT REF SPHER HEAD SCREW 20MM (1EA): Type: IMPLANTABLE DEVICE | Site: HIP | Status: FUNCTIONAL

## 2023-12-12 DEVICE — STEM POLAR CEMENTLESS STANDARD TI/HA 3 (1EA): Type: IMPLANTABLE DEVICE | Site: HIP | Status: FUNCTIONAL

## 2023-12-12 DEVICE — IMPLANT REF THREADED HOLE COVER (1EA): Type: IMPLANTABLE DEVICE | Site: HIP | Status: FUNCTIONAL

## 2023-12-12 DEVICE — IMPLANT OXINIUM FEM HD 12/14 36 MM M/+4 (1EA): Type: IMPLANTABLE DEVICE | Site: HIP | Status: FUNCTIONAL

## 2023-12-12 DEVICE — IMPLANT R3 3 HOLE ACET SHELL 50MM (1EA): Type: IMPLANTABLE DEVICE | Site: HIP | Status: FUNCTIONAL

## 2023-12-12 DEVICE — LINER ACETABULAR R3 ODEG XLPE 36MM X5 0MM (1EA): Type: IMPLANTABLE DEVICE | Site: HIP | Status: FUNCTIONAL

## 2023-12-12 DEVICE — IMPLANT REF SPHER HEAD SCREW 15MM (1EA): Type: IMPLANTABLE DEVICE | Site: HIP | Status: FUNCTIONAL

## 2023-12-12 RX ORDER — TRANEXAMIC ACID 100 MG/ML
INJECTION, SOLUTION INTRAVENOUS PRN
Status: DISCONTINUED | OUTPATIENT
Start: 2023-12-12 | End: 2023-12-12 | Stop reason: SURG

## 2023-12-12 RX ORDER — CEFAZOLIN SODIUM 1 G/3ML
2 INJECTION, POWDER, FOR SOLUTION INTRAMUSCULAR; INTRAVENOUS ONCE
Status: DISCONTINUED | OUTPATIENT
Start: 2023-12-12 | End: 2023-12-12 | Stop reason: HOSPADM

## 2023-12-12 RX ORDER — ONDANSETRON 4 MG/1
4 TABLET, ORALLY DISINTEGRATING ORAL EVERY 8 HOURS PRN
Qty: 20 TABLET | Refills: 0 | Status: SHIPPED | OUTPATIENT
Start: 2023-12-12

## 2023-12-12 RX ORDER — ROSUVASTATIN CALCIUM 10 MG/1
5 TABLET, COATED ORAL EVERY EVENING
Status: DISCONTINUED | OUTPATIENT
Start: 2023-12-12 | End: 2023-12-13 | Stop reason: HOSPADM

## 2023-12-12 RX ORDER — DIPHENHYDRAMINE HYDROCHLORIDE 50 MG/ML
25 INJECTION INTRAMUSCULAR; INTRAVENOUS EVERY 6 HOURS PRN
Status: DISCONTINUED | OUTPATIENT
Start: 2023-12-12 | End: 2023-12-13 | Stop reason: HOSPADM

## 2023-12-12 RX ORDER — TRANEXAMIC ACID 100 MG/ML
1000 INJECTION, SOLUTION INTRAVENOUS ONCE
Status: DISCONTINUED | OUTPATIENT
Start: 2023-12-12 | End: 2023-12-12 | Stop reason: HOSPADM

## 2023-12-12 RX ORDER — ACETAMINOPHEN 500 MG
1000 TABLET ORAL ONCE
Status: COMPLETED | OUTPATIENT
Start: 2023-12-12 | End: 2023-12-12

## 2023-12-12 RX ORDER — ENEMA 19; 7 G/133ML; G/133ML
1 ENEMA RECTAL
Status: DISCONTINUED | OUTPATIENT
Start: 2023-12-12 | End: 2023-12-13 | Stop reason: HOSPADM

## 2023-12-12 RX ORDER — HYDROMORPHONE HYDROCHLORIDE 2 MG/ML
INJECTION, SOLUTION INTRAMUSCULAR; INTRAVENOUS; SUBCUTANEOUS PRN
Status: DISCONTINUED | OUTPATIENT
Start: 2023-12-12 | End: 2023-12-12 | Stop reason: SURG

## 2023-12-12 RX ORDER — SCOLOPAMINE TRANSDERMAL SYSTEM 1 MG/1
1 PATCH, EXTENDED RELEASE TRANSDERMAL
Status: DISCONTINUED | OUTPATIENT
Start: 2023-12-12 | End: 2023-12-13 | Stop reason: HOSPADM

## 2023-12-12 RX ORDER — MIDAZOLAM HYDROCHLORIDE 1 MG/ML
INJECTION INTRAMUSCULAR; INTRAVENOUS PRN
Status: DISCONTINUED | OUTPATIENT
Start: 2023-12-12 | End: 2023-12-12 | Stop reason: SURG

## 2023-12-12 RX ORDER — OXYCODONE HCL 5 MG/5 ML
5 SOLUTION, ORAL ORAL
Status: COMPLETED | OUTPATIENT
Start: 2023-12-12 | End: 2023-12-12

## 2023-12-12 RX ORDER — AMOXICILLIN 250 MG
1 CAPSULE ORAL
Status: DISCONTINUED | OUTPATIENT
Start: 2023-12-12 | End: 2023-12-13 | Stop reason: HOSPADM

## 2023-12-12 RX ORDER — SODIUM CHLORIDE 9 MG/ML
INJECTION, SOLUTION INTRAMUSCULAR; INTRAVENOUS; SUBCUTANEOUS
Status: DISCONTINUED | OUTPATIENT
Start: 2023-12-12 | End: 2023-12-12 | Stop reason: HOSPADM

## 2023-12-12 RX ORDER — IPRATROPIUM BROMIDE AND ALBUTEROL SULFATE 2.5; .5 MG/3ML; MG/3ML
3 SOLUTION RESPIRATORY (INHALATION)
Status: DISCONTINUED | OUTPATIENT
Start: 2023-12-12 | End: 2023-12-12 | Stop reason: HOSPADM

## 2023-12-12 RX ORDER — DEXAMETHASONE SODIUM PHOSPHATE 4 MG/ML
INJECTION, SOLUTION INTRA-ARTICULAR; INTRALESIONAL; INTRAMUSCULAR; INTRAVENOUS; SOFT TISSUE PRN
Status: DISCONTINUED | OUTPATIENT
Start: 2023-12-12 | End: 2023-12-12 | Stop reason: SURG

## 2023-12-12 RX ORDER — ONDANSETRON 2 MG/ML
4 INJECTION INTRAMUSCULAR; INTRAVENOUS
Status: COMPLETED | OUTPATIENT
Start: 2023-12-12 | End: 2023-12-12

## 2023-12-12 RX ORDER — HALOPERIDOL 5 MG/ML
1 INJECTION INTRAMUSCULAR EVERY 6 HOURS PRN
Status: DISCONTINUED | OUTPATIENT
Start: 2023-12-12 | End: 2023-12-13 | Stop reason: HOSPADM

## 2023-12-12 RX ORDER — ACETAMINOPHEN 650 MG
TABLET, EXTENDED RELEASE ORAL
Status: DISCONTINUED | OUTPATIENT
Start: 2023-12-12 | End: 2023-12-12 | Stop reason: HOSPADM

## 2023-12-12 RX ORDER — CELECOXIB 200 MG/1
200 CAPSULE ORAL ONCE
Status: COMPLETED | OUTPATIENT
Start: 2023-12-12 | End: 2023-12-12

## 2023-12-12 RX ORDER — MEPERIDINE HYDROCHLORIDE 25 MG/ML
12.5 INJECTION INTRAMUSCULAR; INTRAVENOUS; SUBCUTANEOUS
Status: DISCONTINUED | OUTPATIENT
Start: 2023-12-12 | End: 2023-12-12 | Stop reason: HOSPADM

## 2023-12-12 RX ORDER — ONDANSETRON 2 MG/ML
4 INJECTION INTRAMUSCULAR; INTRAVENOUS EVERY 4 HOURS PRN
Status: DISCONTINUED | OUTPATIENT
Start: 2023-12-12 | End: 2023-12-13 | Stop reason: HOSPADM

## 2023-12-12 RX ORDER — DIPHENHYDRAMINE HYDROCHLORIDE 50 MG/ML
12.5 INJECTION INTRAMUSCULAR; INTRAVENOUS
Status: DISCONTINUED | OUTPATIENT
Start: 2023-12-12 | End: 2023-12-12 | Stop reason: HOSPADM

## 2023-12-12 RX ORDER — LIDOCAINE HYDROCHLORIDE 20 MG/ML
INJECTION, SOLUTION EPIDURAL; INFILTRATION; INTRACAUDAL; PERINEURAL PRN
Status: DISCONTINUED | OUTPATIENT
Start: 2023-12-12 | End: 2023-12-12 | Stop reason: SURG

## 2023-12-12 RX ORDER — AMOXICILLIN 250 MG
1 CAPSULE ORAL NIGHTLY
Status: DISCONTINUED | OUTPATIENT
Start: 2023-12-12 | End: 2023-12-13 | Stop reason: HOSPADM

## 2023-12-12 RX ORDER — HYDROMORPHONE HYDROCHLORIDE 1 MG/ML
0.2 INJECTION, SOLUTION INTRAMUSCULAR; INTRAVENOUS; SUBCUTANEOUS
Status: DISCONTINUED | OUTPATIENT
Start: 2023-12-12 | End: 2023-12-12 | Stop reason: HOSPADM

## 2023-12-12 RX ORDER — OXYCODONE HYDROCHLORIDE 10 MG/1
10 TABLET ORAL
Status: DISCONTINUED | OUTPATIENT
Start: 2023-12-12 | End: 2023-12-13 | Stop reason: HOSPADM

## 2023-12-12 RX ORDER — PHENYLEPHRINE HCL IN 0.9% NACL 0.5 MG/5ML
SYRINGE (ML) INTRAVENOUS PRN
Status: DISCONTINUED | OUTPATIENT
Start: 2023-12-12 | End: 2023-12-12 | Stop reason: SURG

## 2023-12-12 RX ORDER — EPHEDRINE SULFATE 50 MG/ML
5 INJECTION, SOLUTION INTRAVENOUS
Status: DISCONTINUED | OUTPATIENT
Start: 2023-12-12 | End: 2023-12-12 | Stop reason: HOSPADM

## 2023-12-12 RX ORDER — HYDROMORPHONE HYDROCHLORIDE 1 MG/ML
0.4 INJECTION, SOLUTION INTRAMUSCULAR; INTRAVENOUS; SUBCUTANEOUS
Status: DISCONTINUED | OUTPATIENT
Start: 2023-12-12 | End: 2023-12-12 | Stop reason: HOSPADM

## 2023-12-12 RX ORDER — ASPIRIN 81 MG/1
81 TABLET ORAL 2 TIMES DAILY
Status: DISCONTINUED | OUTPATIENT
Start: 2023-12-12 | End: 2023-12-13 | Stop reason: HOSPADM

## 2023-12-12 RX ORDER — BUPIVACAINE HYDROCHLORIDE AND EPINEPHRINE 2.5; 5 MG/ML; UG/ML
INJECTION, SOLUTION EPIDURAL; INFILTRATION; INTRACAUDAL; PERINEURAL
Status: DISCONTINUED | OUTPATIENT
Start: 2023-12-12 | End: 2023-12-12 | Stop reason: HOSPADM

## 2023-12-12 RX ORDER — CEFAZOLIN SODIUM 1 G/3ML
INJECTION, POWDER, FOR SOLUTION INTRAMUSCULAR; INTRAVENOUS PRN
Status: DISCONTINUED | OUTPATIENT
Start: 2023-12-12 | End: 2023-12-12 | Stop reason: SURG

## 2023-12-12 RX ORDER — DIPHENHYDRAMINE HCL 25 MG
25 TABLET ORAL EVERY 6 HOURS PRN
Status: DISCONTINUED | OUTPATIENT
Start: 2023-12-12 | End: 2023-12-13 | Stop reason: HOSPADM

## 2023-12-12 RX ORDER — ONDANSETRON 2 MG/ML
INJECTION INTRAMUSCULAR; INTRAVENOUS PRN
Status: DISCONTINUED | OUTPATIENT
Start: 2023-12-12 | End: 2023-12-12 | Stop reason: SURG

## 2023-12-12 RX ORDER — SODIUM CHLORIDE, SODIUM LACTATE, POTASSIUM CHLORIDE, CALCIUM CHLORIDE 600; 310; 30; 20 MG/100ML; MG/100ML; MG/100ML; MG/100ML
INJECTION, SOLUTION INTRAVENOUS CONTINUOUS
Status: DISCONTINUED | OUTPATIENT
Start: 2023-12-12 | End: 2023-12-12 | Stop reason: HOSPADM

## 2023-12-12 RX ORDER — HYDROMORPHONE HYDROCHLORIDE 1 MG/ML
0.5 INJECTION, SOLUTION INTRAMUSCULAR; INTRAVENOUS; SUBCUTANEOUS
Status: DISCONTINUED | OUTPATIENT
Start: 2023-12-12 | End: 2023-12-13 | Stop reason: HOSPADM

## 2023-12-12 RX ORDER — HYDRALAZINE HYDROCHLORIDE 20 MG/ML
5 INJECTION INTRAMUSCULAR; INTRAVENOUS
Status: DISCONTINUED | OUTPATIENT
Start: 2023-12-12 | End: 2023-12-12 | Stop reason: HOSPADM

## 2023-12-12 RX ORDER — DEXAMETHASONE SODIUM PHOSPHATE 4 MG/ML
4 INJECTION, SOLUTION INTRA-ARTICULAR; INTRALESIONAL; INTRAMUSCULAR; INTRAVENOUS; SOFT TISSUE
Status: DISCONTINUED | OUTPATIENT
Start: 2023-12-12 | End: 2023-12-13 | Stop reason: HOSPADM

## 2023-12-12 RX ORDER — DEXMEDETOMIDINE HYDROCHLORIDE 100 UG/ML
INJECTION, SOLUTION INTRAVENOUS PRN
Status: DISCONTINUED | OUTPATIENT
Start: 2023-12-12 | End: 2023-12-12 | Stop reason: SURG

## 2023-12-12 RX ORDER — OMEPRAZOLE 20 MG/1
20 CAPSULE, DELAYED RELEASE ORAL 2 TIMES DAILY PRN
Status: DISCONTINUED | OUTPATIENT
Start: 2023-12-12 | End: 2023-12-12

## 2023-12-12 RX ORDER — DOCUSATE SODIUM 100 MG/1
100 CAPSULE, LIQUID FILLED ORAL 2 TIMES DAILY
Status: DISCONTINUED | OUTPATIENT
Start: 2023-12-12 | End: 2023-12-13 | Stop reason: HOSPADM

## 2023-12-12 RX ORDER — TRAMADOL HYDROCHLORIDE 50 MG/1
50 TABLET ORAL EVERY 4 HOURS PRN
Status: DISCONTINUED | OUTPATIENT
Start: 2023-12-12 | End: 2023-12-13 | Stop reason: HOSPADM

## 2023-12-12 RX ORDER — HALOPERIDOL 5 MG/ML
1 INJECTION INTRAMUSCULAR
Status: DISCONTINUED | OUTPATIENT
Start: 2023-12-12 | End: 2023-12-12 | Stop reason: HOSPADM

## 2023-12-12 RX ORDER — OMEPRAZOLE 20 MG/1
20 CAPSULE, DELAYED RELEASE ORAL 2 TIMES DAILY
Status: DISCONTINUED | OUTPATIENT
Start: 2023-12-12 | End: 2023-12-13 | Stop reason: HOSPADM

## 2023-12-12 RX ORDER — POLYETHYLENE GLYCOL 3350 17 G/17G
1 POWDER, FOR SOLUTION ORAL 2 TIMES DAILY PRN
Status: DISCONTINUED | OUTPATIENT
Start: 2023-12-12 | End: 2023-12-13 | Stop reason: HOSPADM

## 2023-12-12 RX ORDER — MAGNESIUM SULFATE HEPTAHYDRATE 40 MG/ML
INJECTION, SOLUTION INTRAVENOUS PRN
Status: DISCONTINUED | OUTPATIENT
Start: 2023-12-12 | End: 2023-12-12 | Stop reason: SURG

## 2023-12-12 RX ORDER — OXYCODONE HCL 5 MG/5 ML
10 SOLUTION, ORAL ORAL
Status: COMPLETED | OUTPATIENT
Start: 2023-12-12 | End: 2023-12-12

## 2023-12-12 RX ORDER — HYDROCODONE BITARTRATE AND ACETAMINOPHEN 5; 325 MG/1; MG/1
1 TABLET ORAL EVERY 4 HOURS PRN
Qty: 42 TABLET | Refills: 0 | Status: SHIPPED | OUTPATIENT
Start: 2023-12-12 | End: 2023-12-19

## 2023-12-12 RX ORDER — EPHEDRINE SULFATE 50 MG/ML
INJECTION, SOLUTION INTRAVENOUS PRN
Status: DISCONTINUED | OUTPATIENT
Start: 2023-12-12 | End: 2023-12-12 | Stop reason: SURG

## 2023-12-12 RX ORDER — HYDROMORPHONE HYDROCHLORIDE 1 MG/ML
0.1 INJECTION, SOLUTION INTRAMUSCULAR; INTRAVENOUS; SUBCUTANEOUS
Status: DISCONTINUED | OUTPATIENT
Start: 2023-12-12 | End: 2023-12-12 | Stop reason: HOSPADM

## 2023-12-12 RX ORDER — SODIUM CHLORIDE, SODIUM LACTATE, POTASSIUM CHLORIDE, CALCIUM CHLORIDE 600; 310; 30; 20 MG/100ML; MG/100ML; MG/100ML; MG/100ML
INJECTION, SOLUTION INTRAVENOUS CONTINUOUS
Status: ACTIVE | OUTPATIENT
Start: 2023-12-12 | End: 2023-12-12

## 2023-12-12 RX ORDER — SODIUM CHLORIDE 9 MG/ML
INJECTION, SOLUTION INTRAVENOUS CONTINUOUS
Status: DISPENSED | OUTPATIENT
Start: 2023-12-12 | End: 2023-12-12

## 2023-12-12 RX ORDER — BISACODYL 10 MG
10 SUPPOSITORY, RECTAL RECTAL
Status: DISCONTINUED | OUTPATIENT
Start: 2023-12-12 | End: 2023-12-13 | Stop reason: HOSPADM

## 2023-12-12 RX ORDER — OXYCODONE HYDROCHLORIDE 5 MG/1
5 TABLET ORAL
Status: DISCONTINUED | OUTPATIENT
Start: 2023-12-12 | End: 2023-12-13 | Stop reason: HOSPADM

## 2023-12-12 RX ORDER — MAGNESIUM HYDROXIDE 1200 MG/15ML
LIQUID ORAL
Status: COMPLETED | OUTPATIENT
Start: 2023-12-12 | End: 2023-12-12

## 2023-12-12 RX ORDER — KETOROLAC TROMETHAMINE 30 MG/ML
INJECTION, SOLUTION INTRAMUSCULAR; INTRAVENOUS
Status: DISCONTINUED | OUTPATIENT
Start: 2023-12-12 | End: 2023-12-12 | Stop reason: HOSPADM

## 2023-12-12 RX ORDER — PHENYLEPHRINE HYDROCHLORIDE 10 MG/ML
INJECTION, SOLUTION INTRAMUSCULAR; INTRAVENOUS; SUBCUTANEOUS PRN
Status: DISCONTINUED | OUTPATIENT
Start: 2023-12-12 | End: 2023-12-12 | Stop reason: SURG

## 2023-12-12 RX ORDER — ASPIRIN 81 MG/1
81 TABLET, CHEWABLE ORAL 2 TIMES DAILY WITH MEALS
Qty: 84 TABLET | Refills: 0 | Status: SHIPPED | OUTPATIENT
Start: 2023-12-12

## 2023-12-12 RX ORDER — ROCURONIUM BROMIDE 10 MG/ML
INJECTION, SOLUTION INTRAVENOUS PRN
Status: DISCONTINUED | OUTPATIENT
Start: 2023-12-12 | End: 2023-12-12 | Stop reason: SURG

## 2023-12-12 RX ADMIN — ACETAMINOPHEN 1000 MG: 500 TABLET ORAL at 07:54

## 2023-12-12 RX ADMIN — DEXMEDETOMIDINE HYDROCHLORIDE 20 MCG: 100 INJECTION, SOLUTION INTRAVENOUS at 10:14

## 2023-12-12 RX ADMIN — EPHEDRINE SULFATE 10 MG: 50 INJECTION, SOLUTION INTRAVENOUS at 10:57

## 2023-12-12 RX ADMIN — SUGAMMADEX 200 MG: 100 INJECTION, SOLUTION INTRAVENOUS at 10:54

## 2023-12-12 RX ADMIN — ROCURONIUM BROMIDE 10 MG: 50 INJECTION, SOLUTION INTRAVENOUS at 10:06

## 2023-12-12 RX ADMIN — ONDANSETRON 4 MG: 2 INJECTION INTRAMUSCULAR; INTRAVENOUS at 11:53

## 2023-12-12 RX ADMIN — PROPOFOL 50 MG: 10 INJECTION, EMULSION INTRAVENOUS at 10:14

## 2023-12-12 RX ADMIN — HYDROMORPHONE HYDROCHLORIDE 0.4 MG: 2 INJECTION INTRAMUSCULAR; INTRAVENOUS; SUBCUTANEOUS at 11:07

## 2023-12-12 RX ADMIN — TRANEXAMIC ACID 1000 MG: 100 INJECTION, SOLUTION INTRAVENOUS at 09:57

## 2023-12-12 RX ADMIN — SODIUM CHLORIDE, POTASSIUM CHLORIDE, SODIUM LACTATE AND CALCIUM CHLORIDE: 600; 310; 30; 20 INJECTION, SOLUTION INTRAVENOUS at 10:59

## 2023-12-12 RX ADMIN — DOCUSATE SODIUM 50MG AND SENNOSIDES 8.6MG 1 TABLET: 8.6; 5 TABLET, FILM COATED ORAL at 21:25

## 2023-12-12 RX ADMIN — Medication 200 MCG: at 10:44

## 2023-12-12 RX ADMIN — SODIUM CHLORIDE, POTASSIUM CHLORIDE, SODIUM LACTATE AND CALCIUM CHLORIDE: 600; 310; 30; 20 INJECTION, SOLUTION INTRAVENOUS at 09:43

## 2023-12-12 RX ADMIN — Medication 200 MCG: at 10:51

## 2023-12-12 RX ADMIN — Medication 200 MCG: at 10:31

## 2023-12-12 RX ADMIN — CEFAZOLIN 2 G: 1 INJECTION, POWDER, FOR SOLUTION INTRAMUSCULAR; INTRAVENOUS at 09:49

## 2023-12-12 RX ADMIN — HYDROMORPHONE HYDROCHLORIDE 0.4 MG: 2 INJECTION INTRAMUSCULAR; INTRAVENOUS; SUBCUTANEOUS at 10:20

## 2023-12-12 RX ADMIN — METFORMIN HYDROCHLORIDE 500 MG: 500 TABLET ORAL at 21:25

## 2023-12-12 RX ADMIN — CEFAZOLIN 2 G: 2 INJECTION, POWDER, FOR SOLUTION INTRAMUSCULAR; INTRAVENOUS at 18:07

## 2023-12-12 RX ADMIN — DEXMEDETOMIDINE HYDROCHLORIDE 20 MCG: 100 INJECTION, SOLUTION INTRAVENOUS at 10:10

## 2023-12-12 RX ADMIN — OXYCODONE HYDROCHLORIDE 5 MG: 5 SOLUTION ORAL at 11:53

## 2023-12-12 RX ADMIN — INSULIN GLARGINE-YFGN 12 UNITS: 100 INJECTION, SOLUTION SUBCUTANEOUS at 21:28

## 2023-12-12 RX ADMIN — PHENYLEPHRINE HYDROCHLORIDE 200 MCG: 10 INJECTION INTRAVENOUS at 10:02

## 2023-12-12 RX ADMIN — FENTANYL CITRATE 50 MCG: 50 INJECTION, SOLUTION INTRAMUSCULAR; INTRAVENOUS at 10:11

## 2023-12-12 RX ADMIN — MAGNESIUM SULFATE HEPTAHYDRATE 4 G: 2 INJECTION, SOLUTION INTRAVENOUS at 10:15

## 2023-12-12 RX ADMIN — MIDAZOLAM HYDROCHLORIDE 2 MG: 1 INJECTION, SOLUTION INTRAMUSCULAR; INTRAVENOUS at 09:46

## 2023-12-12 RX ADMIN — FENTANYL CITRATE 25 MCG: 50 INJECTION, SOLUTION INTRAMUSCULAR; INTRAVENOUS at 11:53

## 2023-12-12 RX ADMIN — PROPOFOL 150 MG: 10 INJECTION, EMULSION INTRAVENOUS at 09:49

## 2023-12-12 RX ADMIN — CELECOXIB 200 MG: 200 CAPSULE ORAL at 07:55

## 2023-12-12 RX ADMIN — ONDANSETRON 4 MG: 2 INJECTION INTRAMUSCULAR; INTRAVENOUS at 10:55

## 2023-12-12 RX ADMIN — Medication 200 MCG: at 10:28

## 2023-12-12 RX ADMIN — FENTANYL CITRATE 50 MCG: 50 INJECTION, SOLUTION INTRAMUSCULAR; INTRAVENOUS at 09:46

## 2023-12-12 RX ADMIN — SODIUM CHLORIDE: 9 INJECTION, SOLUTION INTRAVENOUS at 17:36

## 2023-12-12 RX ADMIN — ROSUVASTATIN 5 MG: 10 TABLET, FILM COATED ORAL at 21:25

## 2023-12-12 RX ADMIN — LIDOCAINE HYDROCHLORIDE 80 MG: 20 INJECTION, SOLUTION EPIDURAL; INFILTRATION; INTRACAUDAL at 09:49

## 2023-12-12 RX ADMIN — TRANEXAMIC ACID 1000 MG: 100 INJECTION, SOLUTION INTRAVENOUS at 10:55

## 2023-12-12 RX ADMIN — PHENYLEPHRINE HYDROCHLORIDE 200 MCG: 10 INJECTION INTRAVENOUS at 10:56

## 2023-12-12 RX ADMIN — DEXAMETHASONE SODIUM PHOSPHATE 8 MG: 4 INJECTION INTRA-ARTICULAR; INTRALESIONAL; INTRAMUSCULAR; INTRAVENOUS; SOFT TISSUE at 10:01

## 2023-12-12 RX ADMIN — OMEPRAZOLE 20 MG: 20 CAPSULE, DELAYED RELEASE ORAL at 18:49

## 2023-12-12 RX ADMIN — EPHEDRINE SULFATE 5 MG: 50 INJECTION, SOLUTION INTRAVENOUS at 11:07

## 2023-12-12 ASSESSMENT — PATIENT HEALTH QUESTIONNAIRE - PHQ9
2. FEELING DOWN, DEPRESSED, IRRITABLE, OR HOPELESS: NOT AT ALL
SUM OF ALL RESPONSES TO PHQ9 QUESTIONS 1 AND 2: 0
1. LITTLE INTEREST OR PLEASURE IN DOING THINGS: NOT AT ALL

## 2023-12-12 ASSESSMENT — COGNITIVE AND FUNCTIONAL STATUS - GENERAL
HELP NEEDED FOR BATHING: A LITTLE
SUGGESTED CMS G CODE MODIFIER MOBILITY: CK
TOILETING: A LITTLE
MOBILITY SCORE: 18
MOVING FROM LYING ON BACK TO SITTING ON SIDE OF FLAT BED: A LITTLE
CLIMB 3 TO 5 STEPS WITH RAILING: A LITTLE
STANDING UP FROM CHAIR USING ARMS: A LITTLE
DRESSING REGULAR LOWER BODY CLOTHING: A LITTLE
MOVING TO AND FROM BED TO CHAIR: A LITTLE
DAILY ACTIVITIY SCORE: 21
SUGGESTED CMS G CODE MODIFIER DAILY ACTIVITY: CJ
WALKING IN HOSPITAL ROOM: A LITTLE
TURNING FROM BACK TO SIDE WHILE IN FLAT BAD: A LITTLE

## 2023-12-12 ASSESSMENT — LIFESTYLE VARIABLES
CONSUMPTION TOTAL: NEGATIVE
ALCOHOL_USE: NO
TOTAL SCORE: 0
HAVE PEOPLE ANNOYED YOU BY CRITICIZING YOUR DRINKING: NO
EVER FELT BAD OR GUILTY ABOUT YOUR DRINKING: NO
TOTAL SCORE: 0
EVER HAD A DRINK FIRST THING IN THE MORNING TO STEADY YOUR NERVES TO GET RID OF A HANGOVER: NO
AVERAGE NUMBER OF DAYS PER WEEK YOU HAVE A DRINK CONTAINING ALCOHOL: 0
HOW MANY TIMES IN THE PAST YEAR HAVE YOU HAD 5 OR MORE DRINKS IN A DAY: 0
TOTAL SCORE: 0
HAVE YOU EVER FELT YOU SHOULD CUT DOWN ON YOUR DRINKING: NO
ON A TYPICAL DAY WHEN YOU DRINK ALCOHOL HOW MANY DRINKS DO YOU HAVE: 0

## 2023-12-12 ASSESSMENT — PAIN DESCRIPTION - PAIN TYPE
TYPE: SURGICAL PAIN
TYPE: SURGICAL PAIN;ACUTE PAIN

## 2023-12-12 ASSESSMENT — FIBROSIS 4 INDEX
FIB4 SCORE: 0.88
FIB4 SCORE: 0.88

## 2023-12-12 ASSESSMENT — PAIN SCALES - GENERAL: PAIN_LEVEL: 6

## 2023-12-12 NOTE — OR NURSING
1130: Report from Chintan IBARRA. Patient cont drowsy, wakes to voice. VSS on 10L 02 via mask. Left hip dressing CDI, pedal pulse +2.     1145: With  assist, patient reports pain 6/10, will admin meds per mar. Denies nausea. No changes to left hip dressing.     1200: Tolerating sips of clears, reports pain improving after meds per mar. Drowsy, but wakes to voice. No changes to left hip dressing.     1215: Cont stable, no changes. Sleeping. Weaning off of 02.     1230: Patient cont drowsy, wakes to voice. NO changes to left hip dressing.     1245: Patient drowsy, denies need for intervention  - no nausea. Reports pain improving. Left hip dressing CDI. Trial patient on RA.    1300: With  assist, patient rates pain 3/10, improved. Cont drowsy, wakes to voice, then back to sleep. Left hip dressing CDI. Sats dropped into 80s on RA, placed back on 1L 02 via NC.     1315: Instructed on IS use, demonstrated efforts to 1000 for multiple breaths. Otherwise no changes, back to sleep when done.     1330: Patient back on 02 to keep Sats in 90s while sleeping/dozing. Patient not able to keep herself awake without stimulus. Otherwise stable, no change to left hip dressing. Monitoring.     1400: Patient cont drowsy, needing 1L 02 via NC to keep Sats in 90s. Cont drowsy. No changes to left hip dressing. Plan to assist to chair to help wake up more.     1430: Assisted patient oob to chair after passing pedal press/pull. Reports she felt dizzy and unstable on transfer with SBA and FWW assist, failed mobility trial at this time. Now in chair, dozing. Sats in 80s on RA for transfer, now back on 1L 02 via NC to keep Sats in 90s. Agreeable to soda for carb. Phone call to Dr Sevilla for possible admit/extended recovery order.     1455: Updated Lawanda LUTHER for admit order, pending reply or call back.     1500: Patient back to chair after trip to bathroom on WC, stable during SBA with FWW to . LLE dressing CDI. +Void. VSS on 1L  02 via NC. Admit order placed per Lawanda LUTHER approval. Meets criteria to transfer to floor room for cont care.     1506: Report to Chintan IBARRA     1537: Report from Chintan IBARRA, bed assigned, pending cleaning. Patient cont stable, no changes.     1630: Cont stable sitting in her chair sleeping, wakes to voice. Hip dressing CDI. Denies need for intervention.    1655: Cont stable, alert. VSS on RA. Transport arrived.

## 2023-12-12 NOTE — OR NURSING
1115: Patient arrived from OR via gurney.  Surgical dressing on left hip CDI; pedal pulse +2. Cold pack in place, no complaints of pain or nausea at this time.    Sedation/Resp Status: Non responsive to verbal.  Respirations spontaneous and non-labored.    HR 91SR; VSS on 10L 02 via simple mask.     1130: Report given to FRED Garcia.

## 2023-12-12 NOTE — DISCHARGE INSTRUCTIONS
What to Expect Post Anesthesia    Rest and take it easy for the first 24 hours.  A responsible adult is recommended to remain with you during that time.  It is normal to feel sleepy.  We encourage you to not do anything that requires balance, judgment or coordination.    FOR 24 HOURS DO NOT:  Drive, operate machinery or run household appliances.  Drink beer or alcoholic beverages.  Make important decisions or sign legal documents.    To avoid nausea, slowly advance diet as tolerated, avoiding spicy or greasy foods for the first day.  Add more substantial food to your diet according to your provider's instructions. INCREASE FLUIDS AND FIBER TO AVOID CONSTIPATION.    MILD FLU-LIKE SYMPTOMS ARE NORMAL.  YOU MAY EXPERIENCE GENERALIZED MUSCLE ACHES, THROAT IRRITATION, HEADACHE AND/OR SOME NAUSEA.    If any questions arise, call your provider.  If your provider is not available, please feel free to call the Surgical Center at [phone].    MEDICATIONS: Resume taking daily medication.  Take prescribed pain medication with food.  If no medication is prescribed, you may take non-aspirin pain medication if needed.  PAIN MEDICATION CAN BE VERY CONSTIPATING.  Take a stool softener or laxative such as senokot, pericolace, or milk of magnesia if needed.    Last pain medication given at

## 2023-12-12 NOTE — ANESTHESIA PREPROCEDURE EVALUATION
Case: [id number] Date/Time: 12/12/23 0845    Procedure: LEFT TOTAL HIP ARTHROPLASTY ANTERIOR APPROACH    Diagnosis: Primary osteoarthritis of left hip [M16.12]    Pre-op diagnosis: Primary osteoarthritis of left hip [M16.12]    Location: [place] [address] / SURGERY [facility]    Surgeons: Osman Sevilla M.D.            Relevant Problems   NEURO   (positive) Other headache syndrome      CARDIAC   (positive) Varicose veins      GI   (positive) GERD (gastroesophageal reflux disease)      ENDO   (positive) Type 2 diabetes mellitus with diabetic polyneuropathy, with long-term current use of insulin (HCC)      Other   (positive) Chest pain   (positive) Hyperlipidemia with target LDL less than 70   (positive) Long-term insulin use (HCC)   (positive) Postural dizziness with presyncope       Physical Exam    Airway   Mallampati: II  TM distance: >3 FB  Neck ROM: full       Cardiovascular - normal exam  Rhythm: regular  Rate: normal  (-) murmur     Dental - normal exam           Pulmonary - normal exam  Breath sounds clear to auscultation     Abdominal    Neurological - normal exam                   Anesthesia Plan    ASA 2       Plan - general       Airway plan will be ETT          Induction: intravenous    Postoperative Plan: Postoperative administration of opioids is intended.    Pertinent diagnostic labs and testing reviewed    Informed Consent:    Anesthetic plan and risks discussed with patient.    Use of blood products discussed with: patient whom consented to blood products.

## 2023-12-12 NOTE — OP REPORT
DIAGNOSIS: Osteoarthritis, left hip.    PROCEDURE: Left Total hip arthroplasty.    ANESTHESIA: General.    COMPLICATIONS: None.    SURGEON: Osman Sevilla MD.    ASSISTANT: Lawanda Lopez    INDICATIONS: This is a patient with severe osteoarthritis causing pain,   having failed conservative treatments.    DESCRIPTION OF PROCEDURE: Patient was identified in the preop area, site was   marked, taken back to the operating room and underwent general anesthesia.   Left lower extremity was prepped and draped in sterile manner. Preoperative   timeout was held and antibiotics were given. Incision was made coming off the  ASIS. Soft tissue dissected down to fascia. Fascia was split in line with   the tensor. Tensor was retracted laterally. Deep fascia was incised and   vessels were ligated. A capsulotomy was performed and then an osteotomy of   the femoral neck. The findings included severe osteoarthritis.  The acetabulum was then reamed up to a 50 and a 50 R3 cluster   hole cup by [employer] was placed. A liner was placed for a 36 head.   Osteophytes were debrided and then the femur was externally rotated and   extended. This was then broached up to a size 3, and a 3 standard offset polar stem  by Smith and Nephew was placed. Final trialing showed equal leg lengths with  a +4 head, the +4 36 Oxinium head by Smith and Nephew was placed. Wound was   soaked with dilute Betadine solution and was injected with Marcaine. Vicryl   was used for the fascia, Monocryl soft tissue skin and Dermabond for the final  skin layer. Patient was woken up, taken back to PACU, will be weightbear as   tolerated.    A walker was provided to assist with ambulation following the above surgery. The patient understands the importance of using the device to safely ambulate until they regain strength and stability.

## 2023-12-12 NOTE — ANESTHESIA TIME REPORT
Anesthesia Start and Stop Event Times       Date Time Event    12/12/2023 0819 Ready for Procedure     0943 Anesthesia Start     1117 Anesthesia Stop          Responsible Staff  12/12/23      Name Role Begin End    Mallory Graham M.D. Anesth [id number]          Overtime Reason:  no overtime (within assigned shift)    Comments:

## 2023-12-12 NOTE — OR NURSING
1508: Report received from FRED Garcia. Patient asleep, respirations are spontaneous and unlabored. VSS on 1L. Dressing is CDI. Left legs elevated. Cold pack in place. CMS: 2+ pedal pulses, brisk cap refill present, warm to touch.     1530: No change, patient resting in recliner.     1537: Report to FRED Garcia.

## 2023-12-12 NOTE — ANESTHESIA PROCEDURE NOTES
Airway    Date/Time: 12/12/2023 9:50 AM    Performed by: Mallory Graham M.D.  Authorized by: Mallory Graham M.D.    Location:  OR  Urgency:  Elective  Difficult Airway: No    Indications for Airway Management:  Anesthesia      Spontaneous Ventilation: absent    Sedation Level:  Deep  Preoxygenated: Yes    Patient Position:  Sniffing  Mask Difficulty Assessment:  1 - vent by mask  Final Airway Type:  Endotracheal airway  Final Endotracheal Airway:  ETT  Cuffed: Yes    Technique Used for Successful ETT Placement:  Direct laryngoscopy    Insertion Site:  Oral  Blade Type:  Gia  Laryngoscope Blade/Videolaryngoscope Blade Size:  3  ETT Size (mm):  7.0  Measured from:  Teeth  ETT to Teeth (cm):  21  Placement Verified by: auscultation and capnometry    Cormack-Lehane Classification:  Grade I - full view of glottis  Number of Attempts at Approach:  1

## 2023-12-12 NOTE — ANESTHESIA POSTPROCEDURE EVALUATION
Patient: Domonique Galeano    Procedure Summary       Date: 12/12/23 Room / Location: [place] [address] / SURGERY [facility]    Anesthesia Start: 0943 Anesthesia Stop: 1117    Procedure: LEFT TOTAL HIP ARTHROPLASTY ANTERIOR APPROACH (Left: Hip) Diagnosis:       Primary osteoarthritis of left hip      (Primary osteoarthritis of left hip [M16.12])    Surgeons: Osman Sevilla M.D. Responsible Provider: Mallory Graham M.D.    Anesthesia Type: general ASA Status: 2            Final Anesthesia Type: general  Last vitals  BP   Blood Pressure : 129/54    Temp   36.8 °C (98.2 °F)    Pulse   99   Resp   14    SpO2   99 %      Anesthesia Post Evaluation    Patient location during evaluation: PACU  Patient participation: complete - patient participated  Level of consciousness: awake and alert  Pain score: 6    Airway patency: patent  Anesthetic complications: no  Cardiovascular status: hemodynamically stable  Respiratory status: acceptable  Hydration status: euvolemic    PONV: none          No notable events documented.     Nurse Pain Score: 6 (NPRS)

## 2023-12-12 NOTE — LETTER
November 14, 2023    Patient Name: Domonique Galeano  Surgeon Name: Osman Sevilla M.D.  Surgery Facility: [facility] ([address] [facility] [place])  Surgery Date: 12/12/2023    The time of your surgery is not final and may change up to and until the day of your surgery. You will be contacted 24-48 hours prior to your surgery date with your check-in and surgery time.    If you will not be at one of the below numbers please call the surgery scheduler at [phone]  Preferred Phone: [phone]    BEFORE YOUR SURGERY   Pre Registration and/or Lab Work must be done within and no earlier than 28 days prior to your surgery date. Your scheduled facility will contact you regarding all required preregistration and/or lab work. If you have not been contacted within 7 days of your scheduled procedure please call [facility] at [phone] for an appointment as soon as possible    DAY OF YOUR SURGERY  Nothing to eat or drink after midnight     Refrain from smoking any substance after midnight prior to surgery. Smoking may interfere with the anesthetic and frequently produces nausea during the recovery period.    Continue taking all lifesaving medications. Including the morning of your surgery with small sip of water.    Please do NOT take on the day of surgery:  Diuretics: examples- furosemide (Lasix), spironolactone, hydrochlorothiazide  ACE-inhibitors: examples- lisinopril, ramipril, enalapril  “ARBs”: examples- losartan, Olmesartan, valsartan    Please arrive at the hospital/surgery center at the check-in time provided.     An adult will need to bring you and take you home after your surgery.     AFTER YOUR SURGERY  Post op Appointment:   Date: 12/27   Time: 10 AM   With: Osman Sevilla MD   Location: [address] [place] [address] [place] [id number]          - No dental work for 3-6 months after your surgery.  - You must have someone provide transportation post  surgery and someone to monitor you for at least 24 hours post-surgery. If you don't have either of these your appointment will be canceled.       TIME OFF WORK  FMLA or Disability forms can be faxed directly to: [phone] or you may drop them off at [address] [place] [address]. Our office charges a $35.00 fee per form. Forms will be completed within 10 business days of drop off and payment received. For the status of your forms you may contact our disability office directly [phone].

## 2023-12-13 VITALS
TEMPERATURE: 98 F | HEIGHT: 64 IN | RESPIRATION RATE: 18 BRPM | WEIGHT: 168.65 LBS | BODY MASS INDEX: 28.79 KG/M2 | DIASTOLIC BLOOD PRESSURE: 72 MMHG | HEART RATE: 100 BPM | SYSTOLIC BLOOD PRESSURE: 103 MMHG | OXYGEN SATURATION: 94 %

## 2023-12-13 LAB
GLUCOSE BLD STRIP.AUTO-MCNC: 162 MG/DL (ref 65–99)
HCT VFR BLD AUTO: 33.9 % (ref 37–47)
HGB BLD-MCNC: 10.9 G/DL (ref 12–16)

## 2023-12-13 PROCEDURE — 85014 HEMATOCRIT: CPT

## 2023-12-13 PROCEDURE — 36415 COLL VENOUS BLD VENIPUNCTURE: CPT

## 2023-12-13 PROCEDURE — 82962 GLUCOSE BLOOD TEST: CPT

## 2023-12-13 PROCEDURE — 700102 HCHG RX REV CODE 250 W/ 637 OVERRIDE(OP): Performed by: ORTHOPAEDIC SURGERY

## 2023-12-13 PROCEDURE — 770030 HCHG ROOM/CARE - EXTENDED RECOVERY EACH 15 MIN

## 2023-12-13 PROCEDURE — 97165 OT EVAL LOW COMPLEX 30 MIN: CPT

## 2023-12-13 PROCEDURE — A9270 NON-COVERED ITEM OR SERVICE: HCPCS | Performed by: ORTHOPAEDIC SURGERY

## 2023-12-13 PROCEDURE — 85018 HEMOGLOBIN: CPT

## 2023-12-13 PROCEDURE — 97162 PT EVAL MOD COMPLEX 30 MIN: CPT

## 2023-12-13 PROCEDURE — 700102 HCHG RX REV CODE 250 W/ 637 OVERRIDE(OP): Performed by: PHYSICIAN ASSISTANT

## 2023-12-13 PROCEDURE — A9270 NON-COVERED ITEM OR SERVICE: HCPCS | Performed by: PHYSICIAN ASSISTANT

## 2023-12-13 PROCEDURE — 97535 SELF CARE MNGMENT TRAINING: CPT

## 2023-12-13 PROCEDURE — 94760 N-INVAS EAR/PLS OXIMETRY 1: CPT

## 2023-12-13 RX ADMIN — SITAGLIPTIN 50 MG: 50 TABLET, FILM COATED ORAL at 06:13

## 2023-12-13 RX ADMIN — DOCUSATE SODIUM 100 MG: 100 CAPSULE, LIQUID FILLED ORAL at 06:13

## 2023-12-13 RX ADMIN — ASPIRIN 81 MG: 81 TABLET, COATED ORAL at 06:14

## 2023-12-13 RX ADMIN — OMEPRAZOLE 20 MG: 20 CAPSULE, DELAYED RELEASE ORAL at 06:40

## 2023-12-13 RX ADMIN — METFORMIN HYDROCHLORIDE 500 MG: 500 TABLET ORAL at 07:42

## 2023-12-13 ASSESSMENT — COGNITIVE AND FUNCTIONAL STATUS - GENERAL
SUGGESTED CMS G CODE MODIFIER MOBILITY: CH
TOILETING: A LITTLE
MOBILITY SCORE: 24
HELP NEEDED FOR BATHING: A LITTLE
SUGGESTED CMS G CODE MODIFIER DAILY ACTIVITY: CJ
DAILY ACTIVITIY SCORE: 20
DRESSING REGULAR LOWER BODY CLOTHING: A LITTLE
PERSONAL GROOMING: A LITTLE

## 2023-12-13 ASSESSMENT — ACTIVITIES OF DAILY LIVING (ADL): TOILETING: INDEPENDENT

## 2023-12-13 ASSESSMENT — GAIT ASSESSMENTS
DISTANCE (FEET): 100
DEVIATION: STEP TO
ASSISTIVE DEVICE: FRONT WHEEL WALKER
DISTANCE (FEET): 15
GAIT LEVEL OF ASSIST: STANDBY ASSIST

## 2023-12-13 ASSESSMENT — PAIN DESCRIPTION - PAIN TYPE: TYPE: SURGICAL PAIN

## 2023-12-13 NOTE — CARE PLAN
The patient is Stable - Low risk of patient condition declining or worsening    Shift Goals  Clinical Goals: Pt will maintain Hgb levels >10.0 during shift; Pt will remain free from falls and injury during shift;  services will be utilized during shift to communicate with patient and address questions.  Patient Goals: Sleep comfortably, on right side if possible.    Progress made toward(s) clinical / shift goals:  Pt reported pain 0/10 during shift ; Pt remained free from falls and injury during shift;  services used appropriately to discuss plan of care and answer questions.    Patient is not progressing towards the following goals:

## 2023-12-13 NOTE — CARE PLAN
The patient is Stable - Low risk of patient condition declining or worsening    Shift Goals  Clinical Goals: Communicate w/pt regarding wound healing and medication education for diabetes; Pt will maintain Hgb levels >10.0 during shift; Pt will remain free from falls and injury during shift;  services will be utilized during shift to communicate with patient and address questions.  Patient Goals: Sleep comfortably, on right side if possible.    Progress made toward(s) clinical / shift goals:  Pt vocal and involved with diabetes management; Lab values monitored for any values not withing normal range.    Patient is not progressing towards the following goals:

## 2023-12-13 NOTE — DISCHARGE PLANNING
Received Choice form at: 0954  Agency/Facility name: [facility]  Referral sent per Choice form at: 1000

## 2023-12-13 NOTE — PROGRESS NOTES
Bedside report received from night RN. Assumed care of patient. Alert and oriented X4, Czech speaking.  in use. Daily plan of care discussed. Pt denies intolerable pain to left hip. Denies nausea and vomiting. Dressing to L hip CDI. CMS intact. Safety precautions in place. Hourly rounding ongoing.     1102 Discharge instruction provided to pt with daughter serves as  per request. Education and DME walker provided as well. All questions answered.

## 2023-12-13 NOTE — PROGRESS NOTES
Yoel [id number],  Services  Pt SpO2 sat 85-89%, used  to communicate to patient that I would be placing a nasal cannula to deliver a small amount of O2 to keep SpO2 >89%. Pt acknowledged understanding.

## 2023-12-13 NOTE — THERAPY
Physical Therapy   Initial Evaluation     Patient Name: Domonique Galeano  Age:  68 y.o., Sex:  female  Medical Record #: [id number]  Today's Date: 12/13/2023     Precautions  Precautions: (P) Anterior Hip Precautions;Weight Bearing As Tolerated Left Lower Extremity    Assessment  Patient is 68 y.o. female with a diagnosis of L THR ant Pt lives at home with  and is active.Pt is safe with bed mob,transfers and ambulation.She understands HEP and precautions and needs a FWW for home      Plan    DC Equipment Recommendations: (P) Front-Wheel Walker  Discharge Recommendations: (P) Recommend outpatient physical therapy services to address higher level deficits       Objective       12/13/23 1000   Charge Group   PT Evaluation PT Evaluation Mod   PT Self Care / Home Evaluation (Units) 1   Total Time Spent   PT Evaluation Time Spent (Mins) 30   Initial Contact Note    Initial Contact Note Order Received and Verified, Physical Therapy Evaluation in Progress with Full Report to Follow.   Precautions   Precautions Anterior Hip Precautions;Weight Bearing As Tolerated Left Lower Extremity   Prior Living Situation   Prior Services Home-Independent   Housing / Facility 1 Story Apartment / Condo   Steps Into Home 0   Steps In Home 0   Equipment Owned Single Point Cane   Lives with - Patient's Self Care Capacity Spouse   Prior Level of Functional Mobility   Bed Mobility Independent   Transfer Status Independent   Ambulation Independent   Ambulation Distance limited community   Assistive Devices Used Single Point Cane   History of Falls   History of Falls No   Cognition    Cognition / Consciousness WDL   Passive ROM Lower Body   Passive ROM Lower Body X   Active ROM Lower Body    Active ROM Lower Body  X   Strength Lower Body   Lower Body Strength  X   Coordination Lower Body    Coordination Lower Body  WDL   Balance Assessment   Sitting Balance (Static) Good   Sitting Balance (Dynamic) Good   Standing Balance (Static)  Fair +   Standing Balance (Dynamic) Fair +   Weight Shift Sitting Good   Weight Shift Standing Good   Gait Analysis   Gait Level Of Assist Standby Assist   Assistive Device Front Wheel Walker   Distance (Feet) 100   # of Times Distance was Traveled 1   Deviation Step To   # of Stairs Climbed 0   Weight Bearing Status wbat L   Functional Mobility   Sit to Stand Standby Assist   Bed, Chair, Wheelchair Transfer Standby Assist   Transfer Method Stand Step   How much difficulty does the patient currently have...   Turning over in bed (including adjusting bedclothes, sheets and blankets)? 4   Sitting down on and standing up from a chair with arms (e.g., wheelchair, bedside commode, etc.) 4   Moving from lying on back to sitting on the side of the bed? 4   How much help from another person does the patient currently need...   Moving to and from a bed to a chair (including a wheelchair)? 4   Need to walk in a hospital room? 4   Climbing 3-5 steps with a railing? 4   6 clicks Mobility Score 24   Activity Tolerance   Sitting in Chair > 1hr   Standing 7   Patient / Family Goals    Patient / Family Goal #1 Home   Anticipated Discharge Equipment and Recommendations   DC Equipment Recommendations Front-Wheel Walker   Discharge Recommendations Recommend outpatient physical therapy services to address higher level deficits   Interdisciplinary Plan of Care Collaboration   IDT Collaboration with  Nursing   Session Information   Date / Session Number  12/13   Priority 0

## 2023-12-13 NOTE — PROGRESS NOTES
4 Eyes Skin Assessment Completed by FRED Camp and FRED Funes.    Head WDL  Ears WDL  Nose WDL  Mouth WDL  Neck WDL  Breast/Chest WDL  Shoulder Blades WDL  Spine WDL  (R) Arm/Elbow/Hand discoloration  (L) Arm/Elbow/Hand discoloration  Abdomen discoloration  Groin WDL  Scrotum/Coccyx/Buttocks WDL  (R) Leg Bruisingscattered  (L) Leg Hip incision-dressing in place  (R) Heel/Foot/Toe Redness, Blanching, and Discoloration to big toe  (L) Heel/Foot/Toe Redness, Blanching, and Discoloration to big toe and pinky toe          Devices In Places Blood Pressure Cuff, Pulse Ox, and SCD's      Interventions In Place Pillows    Possible Skin Injury No    Pictures Uploaded Into Epic N/A  Wound Consult Placed N/A  RN Wound Prevention Protocol Ordered No

## 2023-12-13 NOTE — PROGRESS NOTES
1630-Received report from PACU RN.   1700- Pt arrived to floor via hospital bed then ambulated to bed.  Assumed care of pt.   Pt is AAOX4, no signs of distress, denies nausea/vomiting.   Dressing CDI with dorsiflex and plantar flex both feet, palpable pulses on both LE.   Fall precautions in place, treaded socks on pt, bed in low position.   Call light within reach.   Educated pt to call if needing anything.   Hourly rounding.  1900-Report given to night shift RN.

## 2023-12-13 NOTE — THERAPY
"Occupational Therapy   Initial Evaluation     Patient Name: Domonique Galeano  Age:  68 y.o., Sex:  female  Medical Record #: [id number]  Today's Date: 12/13/2023     Precautions  Precautions: Anterior Hip Precautions, No Weight Bearing Restrictions Left Lower Extremity    Assessment  Patient is 68 y.o. female admit for L Anterior HERMINIO.  Pt tolerated bed mobility, basic self care tasks, ADL transfers and simple mobility with FWW in the room as expected on POD#1.  Pt demos good safety awareness with transfers and ADL's with regards to hip precautions.  Pt is agreeable to obtaining recommended adaptive equipment for ADl's.  Pt will have assist from spouse and intermittent from dtr at home.  Pt and dtr and spouse attentive to education as stated below.  Pt appears appropriate for discharge home.    Treatment completed this session after initial evaluation completed:  Educated pt and dtr and spouse on role of OT and Anterior Total Hip Precautions with ADL's. Reviewed application of precautions and use of Adaptive Equipment for dressing, bathing, toileting, grooming, kitchen activities and general functional mobility in the home. Reviewed the use of reacher, tub transfer bench and raised toilet vs over toilet commode to assist with ADL's as appropriate.   Reviewed tub/ shower transfers. Provided pt with suggestions on where to obtain needed ADL items.   Reviewed safety with IADL's like meal prep, simple household chores in relation to hip precautions.  Pt and family verbalized understanding of all education provided.      Plan    Occupational Therapy Initial Treatment Plan   Duration: Evaluation only    DC Equipment Recommendations: Bed Side Commode, Raised Toilet Seat with Arms, Tub Transfer Bench  Discharge Recommendations: Anticipate that the patient will have no further occupational therapy needs after discharge from the hospital     Subjective    \"It just hurts when I move\"     Objective       12/13/23 0945 "   Prior Living Situation   Prior Services Home-Independent   Housing / Facility 1 Story Apartment / Condo   Steps Into Home 0   Steps In Home 0   Bathroom Set up Bathtub / Shower Combination;Shower Glass Doors  (tall sided tub)   Equipment Owned Single Point Cane;Reacher   Lives with - Patient's Self Care Capacity Spouse   Comments Pt lives with spouse in apt on the property of the apt complex where spouse is in charge of maintenance. He is in and out t/o the day and able to assist as needed.  Dtr also local and able to come by intermittently to assist as needed   Prior Level of ADL Function   Self Feeding Independent   Grooming / Hygiene Independent   Bathing Independent   Dressing Independent   Toileting Independent   Comments having diff with toileting prior to surgery due to diff getting leg into a comfortable position   Prior Level of IADL Function   Medication Management Independent   Laundry Independent   Kitchen Mobility Independent   Finances Independent   Home Management Requires Assist   Shopping Unable To Determine At This Time   Prior Level Of Mobility Independent With Device in Home   Driving / Transportation Unable To Determine At This Time   Occupation (Pre-Hospital Vocational) Not Employed   Leisure Interests Unable To Determine At This Time   Precautions   Precautions Anterior Hip Precautions;No Weight Bearing Restrictions Left Lower Extremity   Vitals   O2 Delivery Device None - Room Air   Pain 0 - 10 Group   Location Hip   Location Orientation Left   Description Sore   Therapist Pain Assessment During Activity;Nurse Notified  (hurts when moving it)   Cognition    Cognition / Consciousness WDL   Level of Consciousness Alert   Comments pleasant, cooperative   Active ROM Upper Body   Active ROM Upper Body  WDL   Dominant Hand Right   Strength Upper Body   Upper Body Strength  WDL   Upper Body Muscle Tone   Upper Body Muscle Tone  WDL   Coordination Upper Body   Coordination WDL   Balance Assessment    Sitting Balance (Static) Good   Sitting Balance (Dynamic) Good   Standing Balance (Static) Fair +   Standing Balance (Dynamic) Fair +   Weight Shift Sitting Good   Weight Shift Standing Fair   Comments with FWW   Bed Mobility    Supine to Sit Minimal Assist   Comments UIC post   ADL Assessment   Upper Body Dressing Modified Independent   Lower Body Dressing Minimal Assist  (pants with reacher, maxA shoes and socks.  Spouse to assist with this at home)   Toileting   (declined need, however reports she feels she needs the RTS)   How much help from another person does the patient currently need...   Putting on and taking off regular lower body clothing? 3   Bathing (including washing, rinsing, and drying)? 3   Toileting, which includes using a toilet, bedpan, or urinal? 3   Putting on and taking off regular upper body clothing? 4   Taking care of personal grooming such as brushing teeth? 3   Eating meals? 4   6 Clicks Daily Activity Score 20   Functional Mobility   Sit to Stand Standby Assist   Bed, Chair, Wheelchair Transfer Standby Assist   Transfer Method Stand Step   Mobility in room with FWW   Distance (Feet) 15   # of Times Distance was Traveled 1   Visual Perception   Visual Perception  WDL   Edema / Skin Assessment   Edema / Skin  Not Assessed   Activity Tolerance   Sitting in Chair > 10 min   Sitting Edge of Bed 10 min   Standing 2 min   Patient / Family Goals   Patient / Family Goal #1 home   Education Group   Education Provided Hip Precautions;Home Safety;Role of Occupational Therapist;Activities of Daily Living;Adaptive Equipment   Role of Occupational Therapist Patient Response Patient;Family;Acceptance;Explanation;Verbal Demonstration   Hip Precautions Patient Response Patient;Family;Acceptance;Explanation;Demonstration;Handout;Verbal Demonstration   Home Safety Patient Response Patient;Family;Acceptance;Explanation;Demonstration;Handout;Verbal Demonstration   ADL Patient Response  Patient;Family;Acceptance;Explanation;Demonstration;Handout;Verbal Demonstration   Adaptive Equipment Patient Response Patient;Family;Acceptance;Explanation;Demonstration;Handout;Verbal Demonstration   Additional Comments see notes, handout provided in Zambian

## 2023-12-13 NOTE — DISCHARGE PLANNING
Case Management Discharge Planning    Admission Date: 12/12/2023  GMLOS:    ALOS: 0    6-Clicks ADL Score: 20  6-Clicks Mobility Score: 24      Anticipated Discharge Dispo: Discharge Disposition: Discharged to home/self care (01)    DME Needed: Yes    DME Ordered: Yes  Brought to bedside  Action(s) Taken: DC Assessment Complete (See below), Choice obtained, Referral(s) sent, and Acceptance Received    Escalations Completed: None    Medically Clear: Yes    Next Steps: AD paket given walker delivered to bedside. Famly here to take home. No other needs identified.    Barriers to Discharge: None    Is the patient up for discharge tomorrow: No    Patient ready for d/c today family at bedside and will transfer home.         Care Transition Team Assessment  Case Management role explained to patient and family . Demographics verified. S/P HERMINIO  Information Source  Orientation Level: Oriented X4 (speaks Indonesian understands some english)  Information Given By: Patient, Relative  Informant's Name: Francisco Javier/SADIQ  Who is responsible for making decisions for patient? : Patient    Readmission Evaluation  Is this a readmission?: No    Elopement Risk  Legal Hold: No  Ambulatory or Self Mobile in Wheelchair: Yes  Disoriented: No  Psychiatric Symptoms: None  History of Wandering: No  Elopement this Admit: No  Vocalizing Wanting to Leave: No  Displays Behaviors, Body Language Wanting to Leave: No-Not at Risk for Elopement  Elopement Risk: Not at Risk for Elopement    Interdisciplinary Discharge Planning  Primary Care Physician: MARCO BELTRÁN M.D.  Lives with - Patient's Self Care Capacity: Spouse  Patient or legal guardian wants to designate a caregiver: No  Support Systems: Spouse / Significant Other, Family Member(s)  Housing / Facility: [address]  Do You Take your Prescribed Medications Regularly: Yes  Able to Return to Previous ADL's: Future Time w/Therapy  Mobility Issues: Yes  Prior Services: Home-Independent  Patient  Prefers to be Discharged to:: home  Assistance Needed: Yes  Durable Medical Equipment: Walker (given to patient at bedside)    Discharge Preparedness  What is your plan after discharge?: Home with help  What are your discharge supports?: Child, Spouse  Prior Functional Level: Independent with Activities of Daily Living    Functional Assesment  Prior Functional Level: Independent with Activities of Daily Living    Finances  Financial Barriers to Discharge: No  Prescription Coverage: Yes    Vision / Hearing Impairment  Vision Impairment : No  Hearing Impairment : No         Advance Directive  Advance Directive?: None  Advance Directive offered?: AD Booklet given    Domestic Abuse  Have you ever been the victim of abuse or violence?: No  Physical Abuse or Sexual Abuse: No  Verbal Abuse or Emotional Abuse: No  Possible Abuse/Neglect Reported to:: Not Applicable    Psychological Assessment  History of Substance Abuse: None  History of Psychiatric Problems: No    Discharge Risks or Barriers  Discharge risks or barriers?: Post-acute placement / services  Patient risk factors: Language barrier, Vulnerable adult    Anticipated Discharge Information  Discharge Disposition: Discharged to home/self care (01)

## 2023-12-14 ENCOUNTER — TELEPHONE (OUTPATIENT)
Dept: HEALTH INFORMATION MANAGEMENT | Facility: OTHER | Age: 68
End: 2023-12-14
Payer: COMMERCIAL

## 2024-01-02 ENCOUNTER — HOSPITAL ENCOUNTER (OUTPATIENT)
Dept: LAB | Facility: MEDICAL CENTER | Age: 69
End: 2024-01-02
Attending: INTERNAL MEDICINE
Payer: COMMERCIAL

## 2024-01-03 PROBLEM — E13.319 RETINOPATHY DUE TO SECONDARY DIABETES (HCC): Status: ACTIVE | Noted: 2024-01-03

## 2024-01-03 NOTE — PROGRESS NOTES
Patient was admitted for a Total Hip Arthroplasty.  Had no complications during the surgery. Did well post-operatively.               Active Hospital Problems    Diagnosis     Primary osteoarthritis of left hip [M16.12]        Uneventful hospital course.     Medication List        START taking these medications        Instructions   aspirin 81 MG Chew chewable tablet  Commonly known as: Aspirin 81   Chew 1 Tablet 2 times a day with meals. Take for 6 weeks for dvt prevention.  Dose: 81 mg     ondansetron 4 MG Tbdp  Commonly known as: Zofran ODT   Take 1 Tablet by mouth every 8 hours as needed for Nausea/Vomiting.  Dose: 4 mg            CHANGE how you take these medications        Instructions   Insulin Degludec 200 UNIT/ML Sopn  What changed:   how much to take  additional instructions   Inject 25 Units under the skin every day.  Dose: 25 Units            CONTINUE taking these medications        Instructions   Accu-Chek Guide strip  Generic drug: glucose blood   USE TWICE DAILY AND AS NEEDED FOR HIGH OR LOW BLOOD SUGAR     BD Pen Needle Chanda 2nd Gen  Generic drug: Insulin Pen Needle 32 G x 4 mm   USE 1 PEN NEEDLE EVERY DAY     Blood Glucose Test Strips   Test strips order: Test strips for Accucheck guide meter. Sig: use twice daily and prn ssx high or low sugar #100 RF x 3 (may exchange from brand strip compatible with her machine)     ergocalciferol 19314 UNIT capsule  Commonly known as: Drisdol   Take 1 Capsule by mouth every 7 days.  Dose: 50,000 Units     FLANAX PAIN RELIEF PO   Take  by mouth as needed.     Jardiance 25 MG Tabs  Generic drug: Empagliflozin   TAKE 25 MG BY MOUTH EVERY DAY.     Lancets   Lancets order: Lancets for Accucheck Guide meter. Sig: use once daily and prn ssx high or low sugar. #100 RF x 3     metFORMIN 500 MG Tabs  Commonly known as: Glucophage   Take 1 Tablet by mouth 2 times a day with meals.  Dose: 500 mg     omeprazole 20 MG delayed-release capsule  Commonly known as: PriLOSEC   Take  1 Capsule by mouth 2 times a day.  Dose: 20 mg     rosuvastatin 5 MG Tabs  Commonly known as: Crestor   Take 1 Tablet by mouth every evening.  Dose: 5 mg     SITagliptin 50 MG Tabs  Commonly known as: Januvia   Take 1 Tablet by mouth every day.  Dose: 50 mg            ASK your doctor about these medications        Instructions   amoxicillin 500 MG Caps  Commonly known as: Amoxil  Ask about: Should I take this medication?   Take 2 Capsules by mouth 2 times a day for 14 days.  Dose: 1,000 mg     clarithromycin 500 MG Tabs  Commonly known as: Biaxin  Ask about: Should I take this medication?   Take 1 Tablet by mouth 2 times a day for 14 days.  Dose: 500 mg     HYDROcodone-acetaminophen 5-325 MG Tabs per tablet  Commonly known as: Norco  Ask about: Should I take this medication?   Take 1 Tablet by mouth every four hours as needed (pain) for up to 7 days.  Dose: 1 Tablet              Patient will be discharged home and follow up with Dr. Sevilla clinic in 2 weeks, for which the patient already has scheduled.

## 2024-01-16 DIAGNOSIS — Z86.73 HISTORY OF ISCHEMIC STROKE: ICD-10-CM

## 2024-01-16 DIAGNOSIS — E78.5 DYSLIPIDEMIA: ICD-10-CM

## 2024-01-16 RX ORDER — ROSUVASTATIN CALCIUM 5 MG/1
5 TABLET, COATED ORAL EVERY EVENING
Qty: 90 TABLET | Refills: 3 | Status: SHIPPED | OUTPATIENT
Start: 2024-01-16

## 2024-01-26 ENCOUNTER — OFFICE VISIT (OUTPATIENT)
Dept: MEDICAL GROUP | Facility: MEDICAL CENTER | Age: 69
End: 2024-01-26
Payer: COMMERCIAL

## 2024-01-26 ENCOUNTER — HOSPITAL ENCOUNTER (OUTPATIENT)
Facility: MEDICAL CENTER | Age: 69
End: 2024-01-26
Attending: INTERNAL MEDICINE
Payer: COMMERCIAL

## 2024-01-26 VITALS
HEART RATE: 83 BPM | SYSTOLIC BLOOD PRESSURE: 120 MMHG | DIASTOLIC BLOOD PRESSURE: 72 MMHG | OXYGEN SATURATION: 97 % | HEIGHT: 64 IN | BODY MASS INDEX: 29.37 KG/M2 | TEMPERATURE: 97.3 F | WEIGHT: 172 LBS

## 2024-01-26 DIAGNOSIS — Z96.642 S/P TOTAL LEFT HIP ARTHROPLASTY: ICD-10-CM

## 2024-01-26 DIAGNOSIS — G89.29 CHRONIC BILATERAL LOW BACK PAIN WITH BILATERAL SCIATICA: ICD-10-CM

## 2024-01-26 DIAGNOSIS — Z12.31 ENCOUNTER FOR SCREENING MAMMOGRAM FOR MALIGNANT NEOPLASM OF BREAST: ICD-10-CM

## 2024-01-26 DIAGNOSIS — M51.36 DDD (DEGENERATIVE DISC DISEASE), LUMBAR: ICD-10-CM

## 2024-01-26 DIAGNOSIS — M54.41 CHRONIC BILATERAL LOW BACK PAIN WITH BILATERAL SCIATICA: ICD-10-CM

## 2024-01-26 DIAGNOSIS — M54.42 CHRONIC BILATERAL LOW BACK PAIN WITH BILATERAL SCIATICA: ICD-10-CM

## 2024-01-26 DIAGNOSIS — Z79.4 TYPE 2 DIABETES MELLITUS WITH DIABETIC POLYNEUROPATHY, WITH LONG-TERM CURRENT USE OF INSULIN (HCC): ICD-10-CM

## 2024-01-26 DIAGNOSIS — R30.0 DYSURIA: ICD-10-CM

## 2024-01-26 DIAGNOSIS — A04.8 H. PYLORI INFECTION: ICD-10-CM

## 2024-01-26 DIAGNOSIS — B35.1 ONYCHOMYCOSIS OF LEFT GREAT TOE: ICD-10-CM

## 2024-01-26 DIAGNOSIS — E11.42 TYPE 2 DIABETES MELLITUS WITH DIABETIC POLYNEUROPATHY, WITH LONG-TERM CURRENT USE OF INSULIN (HCC): ICD-10-CM

## 2024-01-26 LAB
APPEARANCE UR: CLEAR
BILIRUB UR STRIP-MCNC: NEGATIVE MG/DL
COLOR UR AUTO: YELLOW
GLUCOSE UR STRIP.AUTO-MCNC: ABNORMAL MG/DL
KETONES UR STRIP.AUTO-MCNC: NEGATIVE MG/DL
LEUKOCYTE ESTERASE UR QL STRIP.AUTO: ABNORMAL
NITRITE UR QL STRIP.AUTO: POSITIVE
PH UR STRIP.AUTO: 6 [PH] (ref 5–8)
PROT UR QL STRIP: NEGATIVE MG/DL
RBC UR QL AUTO: NEGATIVE
SP GR UR STRIP.AUTO: 1.01
UROBILINOGEN UR STRIP-MCNC: ABNORMAL MG/DL

## 2024-01-26 PROCEDURE — 99214 OFFICE O/P EST MOD 30 MIN: CPT | Performed by: INTERNAL MEDICINE

## 2024-01-26 PROCEDURE — 81002 URINALYSIS NONAUTO W/O SCOPE: CPT | Performed by: INTERNAL MEDICINE

## 2024-01-26 PROCEDURE — 87077 CULTURE AEROBIC IDENTIFY: CPT

## 2024-01-26 PROCEDURE — 87086 URINE CULTURE/COLONY COUNT: CPT

## 2024-01-26 PROCEDURE — 3074F SYST BP LT 130 MM HG: CPT | Performed by: INTERNAL MEDICINE

## 2024-01-26 PROCEDURE — 3078F DIAST BP <80 MM HG: CPT | Performed by: INTERNAL MEDICINE

## 2024-01-26 RX ORDER — EMPAGLIFLOZIN 25 MG/1
TABLET, FILM COATED ORAL
Qty: 90 TABLET | Refills: 3 | Status: SHIPPED | OUTPATIENT
Start: 2024-01-26

## 2024-01-26 ASSESSMENT — FIBROSIS 4 INDEX: FIB4 SCORE: 0.88

## 2024-01-26 NOTE — PROGRESS NOTES
Established Patient    Domonique presents today with the following:    CC: Follow-up for chronic medical problems    HPI:   Domonique is a 68 y.o. female who came in for above.   is used.    She has been having urinary symptoms with burning and suprapubic discomfort.  She takes over-the-counter Azo that turns her urine orange and makes symptoms better but it comes back when she is not taking.    She recently had hip arthroplasty which went well.  She is walking better now with no pain on this side. Right side bothers her a bit but manageable. Her lower back is still bothering her with pain, worsening numbness in her lower extremities when she lies down.    Her stomach pain is now back. She dispensed amoxicillin and clarithromycin to take it with omeprazole but due to hip surgery she postponed H. pylori eradication therapy.      She ran out of metformin and she is not taking Jardiance. She held off aspirin.      ROS:   As above    Patient Active Problem List    Diagnosis Date Noted    Left non proliferative retinopathy due to secondary diabetes ([facility]) 01/03/2024    S/P total left hip arthroplasty 11/01/2023    Calculus of gallbladder without cholecystitis without obstruction 10/20/2023    Type 2 diabetes mellitus with diabetic polyneuropathy, with long-term current use of insulin ([facility]) 04/04/2023    Long-term insulin use ([facility]) 04/04/2023    Chest pain 04/04/2023    Other headache syndrome 04/04/2023    Postmenopause 11/20/2020    History of ischemic stroke 09/15/2020    Low vitamin D level 09/15/2020    Oliguria 08/04/2020    Onychomycosis 08/04/2020    Intertrigo 08/04/2020    Other hemorrhoids 08/04/2020    Postural dizziness with presyncope 08/04/2020    Fall from ground level 08/04/2020    Other fatigue 08/04/2020    Snoring 08/04/2020    Hair loss 08/04/2020    Chronic constipation 08/04/2020    Chronic left hip pain 10/19/2015    Fibroid, uterine 06/12/2015    Hyperlipidemia with target LDL  less than 70 06/12/2015    GERD (gastroesophageal reflux disease) 04/16/2015    Varicose veins 04/13/2015       Current Outpatient Medications   Medication Sig Dispense Refill    Empagliflozin (JARDIANCE) 25 MG Tab TAKE 25 MG BY MOUTH EVERY DAY. 90 Tablet 3    metFORMIN (GLUCOPHAGE) 500 MG Tab Take 1 Tablet by mouth 2 times a day with meals. 180 Tablet 3    rosuvastatin (CRESTOR) 5 MG Tab Take 1 Tablet by mouth every evening. 90 Tablet 3    aspirin (ASPIRIN 81) 81 MG Chew Tab chewable tablet Chew 1 Tablet 2 times a day with meals. Take for 6 weeks for dvt prevention. 84 Tablet 0    ondansetron (ZOFRAN ODT) 4 MG TABLET DISPERSIBLE Take 1 Tablet by mouth every 8 hours as needed for Nausea/Vomiting. 20 Tablet 0    omeprazole (PRILOSEC) 20 MG delayed-release capsule Take 1 Capsule by mouth 2 times a day. 60 Capsule 2    Naproxen Sodium (FLANAX PAIN RELIEF PO) Take  by mouth as needed.      SITagliptin (JANUVIA) 50 MG Tab Take 1 Tablet by mouth every day. 30 Tablet 3    Insulin Pen Needle 32 G x 4 mm (BD PEN NEEDLE ROB 2ND GEN) USE 1 PEN NEEDLE EVERY  Each 3    ACCU-CHEK GUIDE strip USE TWICE DAILY AND AS NEEDED FOR HIGH OR LOW BLOOD SUGAR 100 Strip 3    Lancets Lancets order: Lancets for Accucheck Guide meter. Sig: use once daily and prn ssx high or low sugar. #100 RF x 3 100 Each 3    Insulin Degludec 200 UNIT/ML Solution Pen-injector Inject 25 Units under the skin every day. (Patient taking differently: Inject 12 Units under the skin every day. Last taken 700am yesterday) 15 mL 5    ergocalciferol (DRISDOL) 96268 UNIT capsule Take 1 Capsule by mouth every 7 days. 12 Capsule 3    Blood Glucose Test Strips Test strips order: Test strips for Accucheck guide meter. Sig: use twice daily and prn ssx high or low sugar #100 RF x 3 (may exchange from brand strip compatible with her machine) 100 Each 3     No current facility-administered medications for this visit.         /72 (BP Location: Left arm, Patient  "Position: Sitting, BP Cuff Size: Adult)   Pulse 83   Temp 36.3 °C (97.3 °F) (Temporal)   Ht 1.626 m (5' 4\")   Wt 78 kg (172 lb)   SpO2 97%   BMI 29.52 kg/m²     Physical Exam  General: Alert and oriented, No apparent distress.  Neck: Supple. No cervical or supraclavicular lymphadenopathy noted. Thyroid not enlarged.  Lungs: Clear to auscultation bilaterally without any wheezing, crepitations.  Cardiovascular: Regular rate and rhythm. No murmurs, rubs or gallops.  Abdomen: Bowel sound +, soft, non tender, no rebound or guarding, no palpable organomegaly  Extremities:  Trace edema on right.  Monofilament testing with a 10 gram force: sensation intact: intact bilaterally  Visual Inspection: Feet without maceration, ulcers, fissures.  Pedal pulses: intact bilaterally  Left great toe onychomycosis    Assessment and Plan    1. Dysuria  - POCT Urinalysis to differentiate infection versus atrophic vaginitis symptoms.  If infection, it will be treated by amoxicillin we will start for H. Pylori.    2. S/P total left hip arthroplasty  Recovered well without complication.    3. DDD (degenerative disc disease), lumbar  4. Chronic bilateral low back pain with bilateral sciatica  MRI was ordered before but she did not schedule it. Now that hip issue is better, recommended to schedule lumbar MRI.  - MR-LUMBAR SPINE-W/O; Future    5. H. pylori infection  Recommended to start amoxicillin, clarithromycin and continue omeprazole.    6. Type 2 diabetes mellitus with diabetic polyneuropathy, with long-term current use of insulin (HCC)  Need updated labs.  Recommended to resume Jardiance and refilled metformin.  - Diabetic Monofilament LE Exam  - Empagliflozin (JARDIANCE) 25 MG Tab; TAKE 25 MG BY MOUTH EVERY DAY.  Dispense: 90 Tablet; Refill: 3  - metFORMIN (GLUCOPHAGE) 500 MG Tab; Take 1 Tablet by mouth 2 times a day with meals.  Dispense: 180 Tablet; Refill: 3    7. Onychomycosis of left great toe  Will postpone treating this time " to not mixed with antibiotics and medication SE.    8. Encounter for screening mammogram for malignant neoplasm of breast  - MA-SCREENING MAMMO BILAT W/TOMOSYNTHESIS W/CAD; Future        Return in about 3 months (around 4/26/2024).         Signed by: Mena Christensen M.D.

## 2024-01-28 LAB
BACTERIA UR CULT: ABNORMAL
BACTERIA UR CULT: ABNORMAL
SIGNIFICANT IND 70042: ABNORMAL
SITE SITE: ABNORMAL
SOURCE SOURCE: ABNORMAL

## 2024-01-29 ENCOUNTER — HOSPITAL ENCOUNTER (OUTPATIENT)
Dept: LAB | Facility: MEDICAL CENTER | Age: 69
End: 2024-01-29
Attending: INTERNAL MEDICINE
Payer: COMMERCIAL

## 2024-01-29 ENCOUNTER — APPOINTMENT (OUTPATIENT)
Dept: RADIOLOGY | Facility: MEDICAL CENTER | Age: 69
End: 2024-01-29
Attending: INTERNAL MEDICINE
Payer: COMMERCIAL

## 2024-01-29 DIAGNOSIS — E11.65 UNCONTROLLED TYPE 2 DIABETES MELLITUS WITH HYPERGLYCEMIA (HCC): ICD-10-CM

## 2024-01-29 DIAGNOSIS — Z12.31 ENCOUNTER FOR SCREENING MAMMOGRAM FOR MALIGNANT NEOPLASM OF BREAST: ICD-10-CM

## 2024-01-29 LAB
ALBUMIN SERPL BCP-MCNC: 3.3 G/DL (ref 3.2–4.9)
ALBUMIN/GLOB SERPL: 0.8 G/DL
ALP SERPL-CCNC: 95 U/L (ref 30–99)
ALT SERPL-CCNC: 11 U/L (ref 2–50)
ANION GAP SERPL CALC-SCNC: 12 MMOL/L (ref 7–16)
AST SERPL-CCNC: 15 U/L (ref 12–45)
BILIRUB SERPL-MCNC: 0.2 MG/DL (ref 0.1–1.5)
BUN SERPL-MCNC: 18 MG/DL (ref 8–22)
CALCIUM ALBUM COR SERPL-MCNC: 10.1 MG/DL (ref 8.5–10.5)
CALCIUM SERPL-MCNC: 9.5 MG/DL (ref 8.5–10.5)
CHLORIDE SERPL-SCNC: 106 MMOL/L (ref 96–112)
CHOLEST SERPL-MCNC: 173 MG/DL (ref 100–199)
CO2 SERPL-SCNC: 24 MMOL/L (ref 20–33)
CREAT SERPL-MCNC: 0.6 MG/DL (ref 0.5–1.4)
EST. AVERAGE GLUCOSE BLD GHB EST-MCNC: 177 MG/DL
GFR SERPLBLD CREATININE-BSD FMLA CKD-EPI: 98 ML/MIN/1.73 M 2
GLOBULIN SER CALC-MCNC: 4.2 G/DL (ref 1.9–3.5)
GLUCOSE SERPL-MCNC: 140 MG/DL (ref 65–99)
HBA1C MFR BLD: 7.8 % (ref 4–5.6)
HDLC SERPL-MCNC: 48 MG/DL
LDLC SERPL CALC-MCNC: 107 MG/DL
POTASSIUM SERPL-SCNC: 4.3 MMOL/L (ref 3.6–5.5)
PROT SERPL-MCNC: 7.5 G/DL (ref 6–8.2)
SODIUM SERPL-SCNC: 142 MMOL/L (ref 135–145)
TRIGL SERPL-MCNC: 88 MG/DL (ref 0–149)

## 2024-01-29 PROCEDURE — 36415 COLL VENOUS BLD VENIPUNCTURE: CPT

## 2024-01-29 PROCEDURE — 77067 SCR MAMMO BI INCL CAD: CPT

## 2024-01-29 PROCEDURE — 83036 HEMOGLOBIN GLYCOSYLATED A1C: CPT

## 2024-01-29 PROCEDURE — 80053 COMPREHEN METABOLIC PANEL: CPT

## 2024-01-29 PROCEDURE — 80061 LIPID PANEL: CPT

## 2024-01-30 DIAGNOSIS — E11.42 TYPE 2 DIABETES MELLITUS WITH DIABETIC POLYNEUROPATHY, WITH LONG-TERM CURRENT USE OF INSULIN (HCC): ICD-10-CM

## 2024-01-30 DIAGNOSIS — Z79.4 TYPE 2 DIABETES MELLITUS WITH DIABETIC POLYNEUROPATHY, WITH LONG-TERM CURRENT USE OF INSULIN (HCC): ICD-10-CM

## 2024-02-07 ENCOUNTER — TELEPHONE (OUTPATIENT)
Dept: MEDICAL GROUP | Facility: MEDICAL CENTER | Age: 69
End: 2024-02-07
Payer: COMMERCIAL

## 2024-02-07 NOTE — TELEPHONE ENCOUNTER
----- Message from Mena Christensen M.D. sent at 1/30/2024  8:54 AM PST -----  Please let her know.  Her diabetes is not at target but getting better compared to last time.  Other labs are okay.  Follow-up in 3 months with repeat fasting labs to be done at the lab a few days prior to appt.

## 2024-02-07 NOTE — TELEPHONE ENCOUNTER
mammogram result did not show any suspicious lesion for cancer. We will follow up with regular yearly mammogram.

## 2024-04-09 ENCOUNTER — OFFICE VISIT (OUTPATIENT)
Dept: MEDICAL GROUP | Facility: MEDICAL CENTER | Age: 69
End: 2024-04-09
Payer: MEDICARE

## 2024-04-09 VITALS
HEIGHT: 64 IN | DIASTOLIC BLOOD PRESSURE: 70 MMHG | BODY MASS INDEX: 29.53 KG/M2 | OXYGEN SATURATION: 95 % | HEART RATE: 105 BPM | TEMPERATURE: 97.9 F | SYSTOLIC BLOOD PRESSURE: 108 MMHG | WEIGHT: 173 LBS

## 2024-04-09 DIAGNOSIS — M54.42 CHRONIC BILATERAL LOW BACK PAIN WITH BILATERAL SCIATICA: ICD-10-CM

## 2024-04-09 DIAGNOSIS — R26.89 IMBALANCE: ICD-10-CM

## 2024-04-09 DIAGNOSIS — Z79.4 TYPE 2 DIABETES MELLITUS WITH DIABETIC POLYNEUROPATHY, WITH LONG-TERM CURRENT USE OF INSULIN (HCC): ICD-10-CM

## 2024-04-09 DIAGNOSIS — J31.0 CHRONIC RHINITIS: ICD-10-CM

## 2024-04-09 DIAGNOSIS — M51.36 DDD (DEGENERATIVE DISC DISEASE), LUMBAR: ICD-10-CM

## 2024-04-09 DIAGNOSIS — H91.8X1 OTHER SPECIFIED HEARING LOSS OF RIGHT EAR, UNSPECIFIED HEARING STATUS ON CONTRALATERAL SIDE: ICD-10-CM

## 2024-04-09 DIAGNOSIS — G89.29 CHRONIC BILATERAL LOW BACK PAIN WITH BILATERAL SCIATICA: ICD-10-CM

## 2024-04-09 DIAGNOSIS — E11.42 TYPE 2 DIABETES MELLITUS WITH DIABETIC POLYNEUROPATHY, WITH LONG-TERM CURRENT USE OF INSULIN (HCC): ICD-10-CM

## 2024-04-09 DIAGNOSIS — R42 DIZZINESS: ICD-10-CM

## 2024-04-09 DIAGNOSIS — M26.621 ARTHRALGIA OF RIGHT TEMPOROMANDIBULAR JOINT: ICD-10-CM

## 2024-04-09 DIAGNOSIS — D64.9 ANEMIA, UNSPECIFIED TYPE: ICD-10-CM

## 2024-04-09 DIAGNOSIS — M54.41 CHRONIC BILATERAL LOW BACK PAIN WITH BILATERAL SCIATICA: ICD-10-CM

## 2024-04-09 PROCEDURE — 3078F DIAST BP <80 MM HG: CPT | Performed by: INTERNAL MEDICINE

## 2024-04-09 PROCEDURE — 99214 OFFICE O/P EST MOD 30 MIN: CPT | Performed by: INTERNAL MEDICINE

## 2024-04-09 PROCEDURE — 3074F SYST BP LT 130 MM HG: CPT | Performed by: INTERNAL MEDICINE

## 2024-04-09 RX ORDER — MECLIZINE HYDROCHLORIDE 25 MG/1
25 TABLET ORAL 3 TIMES DAILY PRN
Qty: 30 TABLET | Refills: 0 | Status: SHIPPED | OUTPATIENT
Start: 2024-04-09

## 2024-04-09 RX ORDER — FLUTICASONE PROPIONATE 50 MCG
2 SPRAY, SUSPENSION (ML) NASAL NIGHTLY
Qty: 16 G | Refills: 0 | Status: SHIPPED | OUTPATIENT
Start: 2024-04-09 | End: 2024-04-24

## 2024-04-09 ASSESSMENT — FIBROSIS 4 INDEX: FIB4 SCORE: 1.11

## 2024-04-10 NOTE — PROGRESS NOTES
Established Patient    Domonique presents today with the following:    CC: Dizzy, follow-up for chronic medical problems    HPI:   Domonique is a 69 y.o. female who came in for above.    She did not do her labs for diabetes.  She has been feeling dizzy for 1-1/2 months.  She fell once a week ago from feeling unsteady.  There is no vertigo but the sensation of losing balance.  It can happen while walking or sitting but mostly with walking. Not worse with head turning. She has hearing loss on the right ear.  She is having TMJ pain on the right side as well.    Her hip is better after hip arthroplasty on the left side. She is having back pain. She has not done MRI.    ROS:   As above    Patient Active Problem List    Diagnosis Date Noted    Left non proliferative retinopathy due to secondary diabetes ([facility]) 01/03/2024    S/P total left hip arthroplasty 11/01/2023    Calculus of gallbladder without cholecystitis without obstruction 10/20/2023    Type 2 diabetes mellitus with diabetic polyneuropathy, with long-term current use of insulin ([facility]) 04/04/2023    Long-term insulin use ([facility]) 04/04/2023    Chest pain 04/04/2023    Other headache syndrome 04/04/2023    Postmenopause 11/20/2020    History of ischemic stroke 09/15/2020    Low vitamin D level 09/15/2020    Oliguria 08/04/2020    Onychomycosis 08/04/2020    Intertrigo 08/04/2020    Other hemorrhoids 08/04/2020    Postural dizziness with presyncope 08/04/2020    Fall from ground level 08/04/2020    Other fatigue 08/04/2020    Snoring 08/04/2020    Hair loss 08/04/2020    Chronic constipation 08/04/2020    Chronic left hip pain 10/19/2015    Fibroid, uterine 06/12/2015    Hyperlipidemia with target LDL less than 70 06/12/2015    GERD (gastroesophageal reflux disease) 04/16/2015    Varicose veins 04/13/2015       Current Outpatient Medications   Medication Sig Dispense Refill    meclizine (ANTIVERT) 25 MG Tab Take 1 Tablet by mouth 3 times a day as needed for  Dizziness. 30 Tablet 0    fluticasone (FLONASE) 50 MCG/ACT nasal spray Administer 2 Sprays into affected nostril(S) every evening. 16 g 0    Empagliflozin (JARDIANCE) 25 MG Tab TAKE 25 MG BY MOUTH EVERY DAY. 90 Tablet 3    metFORMIN (GLUCOPHAGE) 500 MG Tab Take 1 Tablet by mouth 2 times a day with meals. 180 Tablet 3    rosuvastatin (CRESTOR) 5 MG Tab Take 1 Tablet by mouth every evening. 90 Tablet 3    aspirin (ASPIRIN 81) 81 MG Chew Tab chewable tablet Chew 1 Tablet 2 times a day with meals. Take for 6 weeks for dvt prevention. 84 Tablet 0    ondansetron (ZOFRAN ODT) 4 MG TABLET DISPERSIBLE Take 1 Tablet by mouth every 8 hours as needed for Nausea/Vomiting. 20 Tablet 0    omeprazole (PRILOSEC) 20 MG delayed-release capsule Take 1 Capsule by mouth 2 times a day. 60 Capsule 2    Naproxen Sodium (FLANAX PAIN RELIEF PO) Take  by mouth as needed.      SITagliptin (JANUVIA) 50 MG Tab Take 1 Tablet by mouth every day. 30 Tablet 3    Insulin Pen Needle 32 G x 4 mm (BD PEN NEEDLE ROB 2ND GEN) USE 1 PEN NEEDLE EVERY  Each 3    ACCU-CHEK GUIDE strip USE TWICE DAILY AND AS NEEDED FOR HIGH OR LOW BLOOD SUGAR 100 Strip 3    Lancets Lancets order: Lancets for Accucheck Guide meter. Sig: use once daily and prn ssx high or low sugar. #100 RF x 3 100 Each 3    Insulin Degludec 200 UNIT/ML Solution Pen-injector Inject 25 Units under the skin every day. (Patient taking differently: Inject 12 Units under the skin every day. Last taken 700am yesterday) 15 mL 5    ergocalciferol (DRISDOL) 98392 UNIT capsule Take 1 Capsule by mouth every 7 days. 12 Capsule 3    Blood Glucose Test Strips Test strips order: Test strips for Accucheck guide meter. Sig: use twice daily and prn ssx high or low sugar #100 RF x 3 (may exchange from brand strip compatible with her machine) 100 Each 3     No current facility-administered medications for this visit.         /70   Pulse (!) 105   Temp 36.6 °C (97.9 °F) (Temporal)   Ht 1.626 m (5'  "4\")   Wt 78.5 kg (173 lb)   SpO2 95%   BMI 29.70 kg/m²     Physical Exam  General: Alert and oriented, No apparent distress.  Bilateral TM clear.  Ear canals are normal.  There is significant nasal turbinate edema.  Neck: Supple. No cervical or supraclavicular lymphadenopathy noted. Thyroid not enlarged.  Lungs: Clear to auscultation bilaterally without any wheezing, crepitations.  Cardiovascular: Regular rate and rhythm. No murmurs, rubs or gallops.  Abdomen: Bowel sound +, soft, non tender, no rebound or guarding, no palpable organomegaly  Extremities: No edema.      Assessment and Plan    1. Dizziness  2. Imbalance  No obvious etiology.  Having diabetes peripheral neuropathy and back pain may contribute.  She can try meclizine for symptomatic relief with caution for drowsiness.  Try physical therapy.  - Referral to Physical Therapy  - meclizine (ANTIVERT) 25 MG Tab; Take 1 Tablet by mouth 3 times a day as needed for Dizziness.  Dispense: 30 Tablet; Refill: 0    3. Chronic rhinitis  - fluticasone (FLONASE) 50 MCG/ACT nasal spray; Administer 2 Sprays into affected nostril(S) every evening.  Dispense: 16 g; Refill: 0    4. DDD (degenerative disc disease), lumbar  5. Chronic bilateral low back pain with bilateral sciatica  For years. Worse and cannot sleep at night well. She has failed conservative therapy with medication. We will take a look with MRI and see if she needs to see a spine surgeon for surgical intervention or pain management for interventional pain relief.  - MR-LUMBAR SPINE-W/O; Future    6. Type 2 diabetes mellitus with diabetic polyneuropathy, with long-term current use of insulin (HCC)  Currently on glargine 12 units and oral medications.  Needs updated lab.  Reminded to go within the next 2 weeks.  Based on labs, we will decide whether she could follow-up in 3 months or 6 months.  - Comp Metabolic Panel; Future  - MICROALBUMIN CREAT RATIO URINE; Future  - HEMOGLOBIN A1C; Future  - Lipid Profile; " Future    7. Anemia, unspecified type  .  Anemia.  Repeat CBC.  - CBC WITH DIFFERENTIAL; Future    8. Other specified hearing loss of right ear, unspecified hearing status on contralateral side  - Referral to Audiology    9. Arthralgia of right temporomandibular joint  She also has bruxism.  recommended to talk to her dentist.      Return in about 3 months (around 7/9/2024).         Signed by: Mena Christensen M.D.

## 2024-04-11 DIAGNOSIS — A04.8 H. PYLORI INFECTION: ICD-10-CM

## 2024-04-11 NOTE — TELEPHONE ENCOUNTER
Received request via: Patient    Was the patient seen in the last year in this department? Yes    Does the patient have an active prescription (recently filled or refills available) for medication(s) requested? No    Pharmacy Name: [facility]/pharmacy #[id number] - [place] - [address] in [place]     Does the patient have skilled nursing Plus and need 100 day supply (blood pressure, diabetes and cholesterol meds only)? Medication is not for cholesterol, blood pressure or diabetes

## 2024-04-12 RX ORDER — OMEPRAZOLE 20 MG/1
20 CAPSULE, DELAYED RELEASE ORAL DAILY
Qty: 90 CAPSULE | Refills: 3 | Status: SHIPPED | OUTPATIENT
Start: 2024-04-12

## 2024-04-16 ENCOUNTER — HOSPITAL ENCOUNTER (OUTPATIENT)
Dept: LAB | Facility: MEDICAL CENTER | Age: 69
End: 2024-04-16
Attending: INTERNAL MEDICINE
Payer: MEDICARE

## 2024-04-16 DIAGNOSIS — Z79.4 TYPE 2 DIABETES MELLITUS WITH DIABETIC POLYNEUROPATHY, WITH LONG-TERM CURRENT USE OF INSULIN (HCC): ICD-10-CM

## 2024-04-16 DIAGNOSIS — E11.42 TYPE 2 DIABETES MELLITUS WITH DIABETIC POLYNEUROPATHY, WITH LONG-TERM CURRENT USE OF INSULIN (HCC): ICD-10-CM

## 2024-04-16 DIAGNOSIS — D64.9 ANEMIA, UNSPECIFIED TYPE: ICD-10-CM

## 2024-04-16 LAB
BASOPHILS # BLD AUTO: 0.7 % (ref 0–1.8)
BASOPHILS # BLD: 0.05 K/UL (ref 0–0.12)
CREAT UR-MCNC: 91.06 MG/DL
EOSINOPHIL # BLD AUTO: 0.21 K/UL (ref 0–0.51)
EOSINOPHIL NFR BLD: 3.1 % (ref 0–6.9)
ERYTHROCYTE [DISTWIDTH] IN BLOOD BY AUTOMATED COUNT: 50.4 FL (ref 35.9–50)
EST. AVERAGE GLUCOSE BLD GHB EST-MCNC: 197 MG/DL
HBA1C MFR BLD: 8.5 % (ref 4–5.6)
HCT VFR BLD AUTO: 40.1 % (ref 37–47)
HGB BLD-MCNC: 12.6 G/DL (ref 12–16)
IMM GRANULOCYTES # BLD AUTO: 0.01 K/UL (ref 0–0.11)
IMM GRANULOCYTES NFR BLD AUTO: 0.1 % (ref 0–0.9)
LYMPHOCYTES # BLD AUTO: 2.65 K/UL (ref 1–4.8)
LYMPHOCYTES NFR BLD: 38.6 % (ref 22–41)
MCH RBC QN AUTO: 26.1 PG (ref 27–33)
MCHC RBC AUTO-ENTMCNC: 31.4 G/DL (ref 32.2–35.5)
MCV RBC AUTO: 83 FL (ref 81.4–97.8)
MICROALBUMIN UR-MCNC: 2.5 MG/DL
MICROALBUMIN/CREAT UR: 27 MG/G (ref 0–30)
MONOCYTES # BLD AUTO: 0.43 K/UL (ref 0–0.85)
MONOCYTES NFR BLD AUTO: 6.3 % (ref 0–13.4)
NEUTROPHILS # BLD AUTO: 3.52 K/UL (ref 1.82–7.42)
NEUTROPHILS NFR BLD: 51.2 % (ref 44–72)
NRBC # BLD AUTO: 0 K/UL
NRBC BLD-RTO: 0 /100 WBC (ref 0–0.2)
PLATELET # BLD AUTO: 302 K/UL (ref 164–446)
PMV BLD AUTO: 10.8 FL (ref 9–12.9)
RBC # BLD AUTO: 4.83 M/UL (ref 4.2–5.4)
WBC # BLD AUTO: 6.9 K/UL (ref 4.8–10.8)

## 2024-04-16 PROCEDURE — 82043 UR ALBUMIN QUANTITATIVE: CPT

## 2024-04-16 PROCEDURE — 80061 LIPID PANEL: CPT

## 2024-04-16 PROCEDURE — 36415 COLL VENOUS BLD VENIPUNCTURE: CPT

## 2024-04-16 PROCEDURE — 83036 HEMOGLOBIN GLYCOSYLATED A1C: CPT | Mod: GA

## 2024-04-16 PROCEDURE — 80053 COMPREHEN METABOLIC PANEL: CPT

## 2024-04-16 PROCEDURE — 82570 ASSAY OF URINE CREATININE: CPT

## 2024-04-16 PROCEDURE — 85025 COMPLETE CBC W/AUTO DIFF WBC: CPT

## 2024-04-17 DIAGNOSIS — E11.42 TYPE 2 DIABETES MELLITUS WITH DIABETIC POLYNEUROPATHY, WITH LONG-TERM CURRENT USE OF INSULIN (HCC): ICD-10-CM

## 2024-04-17 DIAGNOSIS — Z79.4 TYPE 2 DIABETES MELLITUS WITH DIABETIC POLYNEUROPATHY, WITH LONG-TERM CURRENT USE OF INSULIN (HCC): ICD-10-CM

## 2024-04-17 LAB
ALBUMIN SERPL BCP-MCNC: 4.4 G/DL (ref 3.2–4.9)
ALBUMIN/GLOB SERPL: 1.5 G/DL
ALP SERPL-CCNC: 83 U/L (ref 30–99)
ALT SERPL-CCNC: 5 U/L (ref 2–50)
ANION GAP SERPL CALC-SCNC: 14 MMOL/L (ref 7–16)
AST SERPL-CCNC: 15 U/L (ref 12–45)
BILIRUB SERPL-MCNC: 0.3 MG/DL (ref 0.1–1.5)
BUN SERPL-MCNC: 22 MG/DL (ref 8–22)
CALCIUM ALBUM COR SERPL-MCNC: 9.1 MG/DL (ref 8.5–10.5)
CALCIUM SERPL-MCNC: 9.4 MG/DL (ref 8.5–10.5)
CHLORIDE SERPL-SCNC: 108 MMOL/L (ref 96–112)
CHOLEST SERPL-MCNC: 136 MG/DL (ref 100–199)
CO2 SERPL-SCNC: 22 MMOL/L (ref 20–33)
CREAT SERPL-MCNC: 0.61 MG/DL (ref 0.5–1.4)
FASTING STATUS PATIENT QL REPORTED: NORMAL
GFR SERPLBLD CREATININE-BSD FMLA CKD-EPI: 97 ML/MIN/1.73 M 2
GLOBULIN SER CALC-MCNC: 2.9 G/DL (ref 1.9–3.5)
GLUCOSE SERPL-MCNC: 124 MG/DL (ref 65–99)
HDLC SERPL-MCNC: 42 MG/DL
LDLC SERPL CALC-MCNC: 78 MG/DL
POTASSIUM SERPL-SCNC: 4.5 MMOL/L (ref 3.6–5.5)
PROT SERPL-MCNC: 7.3 G/DL (ref 6–8.2)
SODIUM SERPL-SCNC: 144 MMOL/L (ref 135–145)
TRIGL SERPL-MCNC: 79 MG/DL (ref 0–149)

## 2024-04-18 ENCOUNTER — TELEPHONE (OUTPATIENT)
Dept: MEDICAL GROUP | Facility: MEDICAL CENTER | Age: 69
End: 2024-04-18
Payer: MEDICARE

## 2024-04-18 NOTE — TELEPHONE ENCOUNTER
----- Message from Mena Christensen M.D. sent at 4/17/2024 12:22 PM PDT -----  Please call patient that her diabetes is not controlled.  She needs to increase insulin back to 25 units daily and repeat fasting labs in 3 months to follow-up.  Please mail lab orders from today to her address.  Dr Christensen

## 2024-04-23 DIAGNOSIS — J31.0 CHRONIC RHINITIS: ICD-10-CM

## 2024-04-24 RX ORDER — FLUTICASONE PROPIONATE 50 MCG
2 SPRAY, SUSPENSION (ML) NASAL NIGHTLY
Qty: 48 ML | Refills: 1 | Status: SHIPPED | OUTPATIENT
Start: 2024-04-24

## 2024-04-24 NOTE — TELEPHONE ENCOUNTER
Received request via: Patient    Was the patient seen in the last year in this department? Yes    Does the patient have an active prescription (recently filled or refills available) for medication(s) requested? No    Pharmacy Name: [facility]/pharmacy #[id number] - [place], [place] - [address] in [place]     Does the patient have snf Plus and need 100 day supply (blood pressure, diabetes and cholesterol meds only)? Patient does not have SCP

## 2024-04-26 ENCOUNTER — OFFICE VISIT (OUTPATIENT)
Dept: MEDICAL GROUP | Facility: MEDICAL CENTER | Age: 69
End: 2024-04-26
Payer: MEDICARE

## 2024-04-26 VITALS
TEMPERATURE: 97.4 F | DIASTOLIC BLOOD PRESSURE: 74 MMHG | BODY MASS INDEX: 29.53 KG/M2 | HEIGHT: 64 IN | WEIGHT: 173 LBS | SYSTOLIC BLOOD PRESSURE: 128 MMHG | RESPIRATION RATE: 16 BRPM | OXYGEN SATURATION: 96 % | HEART RATE: 87 BPM

## 2024-04-26 DIAGNOSIS — E78.5 DYSLIPIDEMIA: ICD-10-CM

## 2024-04-26 DIAGNOSIS — E11.42 TYPE 2 DIABETES MELLITUS WITH DIABETIC POLYNEUROPATHY, WITH LONG-TERM CURRENT USE OF INSULIN (HCC): ICD-10-CM

## 2024-04-26 DIAGNOSIS — Z79.4 TYPE 2 DIABETES MELLITUS WITH DIABETIC POLYNEUROPATHY, WITH LONG-TERM CURRENT USE OF INSULIN (HCC): ICD-10-CM

## 2024-04-26 PROCEDURE — 3078F DIAST BP <80 MM HG: CPT | Performed by: INTERNAL MEDICINE

## 2024-04-26 PROCEDURE — 3074F SYST BP LT 130 MM HG: CPT | Performed by: INTERNAL MEDICINE

## 2024-04-26 PROCEDURE — 99213 OFFICE O/P EST LOW 20 MIN: CPT | Performed by: INTERNAL MEDICINE

## 2024-04-26 ASSESSMENT — FIBROSIS 4 INDEX: FIB4 SCORE: 1.51

## 2024-04-26 ASSESSMENT — PATIENT HEALTH QUESTIONNAIRE - PHQ9: CLINICAL INTERPRETATION OF PHQ2 SCORE: 0

## 2024-07-03 ENCOUNTER — APPOINTMENT (OUTPATIENT)
Dept: PHYSICAL THERAPY | Facility: REHABILITATION | Age: 69
End: 2024-07-03
Attending: INTERNAL MEDICINE
Payer: MEDICARE

## 2024-07-12 ENCOUNTER — APPOINTMENT (OUTPATIENT)
Dept: PHYSICAL THERAPY | Facility: REHABILITATION | Age: 69
End: 2024-07-12
Attending: INTERNAL MEDICINE
Payer: MEDICARE

## 2024-07-17 ENCOUNTER — APPOINTMENT (OUTPATIENT)
Dept: PHYSICAL THERAPY | Facility: REHABILITATION | Age: 69
End: 2024-07-17
Attending: INTERNAL MEDICINE
Payer: MEDICARE

## 2024-07-24 ENCOUNTER — APPOINTMENT (OUTPATIENT)
Dept: PHYSICAL THERAPY | Facility: REHABILITATION | Age: 69
End: 2024-07-24
Attending: INTERNAL MEDICINE
Payer: MEDICARE

## 2024-07-26 ENCOUNTER — APPOINTMENT (OUTPATIENT)
Dept: PHYSICAL THERAPY | Facility: REHABILITATION | Age: 69
End: 2024-07-26
Attending: INTERNAL MEDICINE
Payer: MEDICARE

## 2024-08-07 ENCOUNTER — APPOINTMENT (OUTPATIENT)
Dept: PHYSICAL THERAPY | Facility: REHABILITATION | Age: 69
End: 2024-08-07
Attending: INTERNAL MEDICINE
Payer: MEDICARE

## 2024-08-14 ENCOUNTER — APPOINTMENT (OUTPATIENT)
Dept: PHYSICAL THERAPY | Facility: REHABILITATION | Age: 69
End: 2024-08-14
Attending: INTERNAL MEDICINE
Payer: MEDICARE

## 2024-09-27 ENCOUNTER — PHARMACY VISIT (OUTPATIENT)
Dept: PHARMACY | Facility: MEDICAL CENTER | Age: 69
End: 2024-09-27
Payer: COMMERCIAL

## 2024-09-27 ENCOUNTER — OFFICE VISIT (OUTPATIENT)
Dept: MEDICAL GROUP | Facility: MEDICAL CENTER | Age: 69
End: 2024-09-27
Payer: MEDICARE

## 2024-09-27 VITALS
DIASTOLIC BLOOD PRESSURE: 78 MMHG | WEIGHT: 179.5 LBS | HEART RATE: 98 BPM | BODY MASS INDEX: 30.64 KG/M2 | HEIGHT: 64 IN | SYSTOLIC BLOOD PRESSURE: 120 MMHG | TEMPERATURE: 98.4 F | OXYGEN SATURATION: 94 %

## 2024-09-27 DIAGNOSIS — E11.42 TYPE 2 DIABETES MELLITUS WITH DIABETIC POLYNEUROPATHY, WITH LONG-TERM CURRENT USE OF INSULIN (HCC): ICD-10-CM

## 2024-09-27 DIAGNOSIS — Z23 NEED FOR VACCINATION: ICD-10-CM

## 2024-09-27 DIAGNOSIS — Z79.4 TYPE 2 DIABETES MELLITUS WITH DIABETIC POLYNEUROPATHY, WITH LONG-TERM CURRENT USE OF INSULIN (HCC): ICD-10-CM

## 2024-09-27 LAB
HBA1C MFR BLD: 9.4 % (ref ?–5.8)
POCT INT CON NEG: NEGATIVE
POCT INT CON POS: POSITIVE

## 2024-09-27 PROCEDURE — RXMED WILLOW AMBULATORY MEDICATION CHARGE: Performed by: INTERNAL MEDICINE

## 2024-09-27 PROCEDURE — 3074F SYST BP LT 130 MM HG: CPT | Performed by: INTERNAL MEDICINE

## 2024-09-27 PROCEDURE — G0008 ADMIN INFLUENZA VIRUS VAC: HCPCS | Performed by: INTERNAL MEDICINE

## 2024-09-27 PROCEDURE — 3078F DIAST BP <80 MM HG: CPT | Performed by: INTERNAL MEDICINE

## 2024-09-27 PROCEDURE — 99214 OFFICE O/P EST MOD 30 MIN: CPT | Mod: 25 | Performed by: INTERNAL MEDICINE

## 2024-09-27 PROCEDURE — 83036 HEMOGLOBIN GLYCOSYLATED A1C: CPT | Performed by: INTERNAL MEDICINE

## 2024-09-27 PROCEDURE — 90662 IIV NO PRSV INCREASED AG IM: CPT | Performed by: INTERNAL MEDICINE

## 2024-09-27 RX ORDER — EMPAGLIFLOZIN 25 MG/1
1 TABLET, FILM COATED ORAL DAILY
Qty: 90 TABLET | Refills: 1 | Status: SHIPPED | OUTPATIENT
Start: 2024-09-27

## 2024-09-27 RX ORDER — EMPAGLIFLOZIN 25 MG/1
TABLET, FILM COATED ORAL
Qty: 90 TABLET | Refills: 3 | Status: SHIPPED | OUTPATIENT
Start: 2024-09-27 | End: 2024-09-27

## 2024-09-27 ASSESSMENT — FIBROSIS 4 INDEX: FIB4 SCORE: 1.51

## 2024-09-27 NOTE — PROGRESS NOTES
Established Patient    Domonique presents today with the following:    CC: Medication refills    HPI:   Domonique is a 69 y.o. female who came in for above.    She came in with her  who said she is running out of medications in a few days for insulin and in 20 days for other oral medications.  She did not do labs unfortunately.          ROS:   As above    Patient Active Problem List    Diagnosis Date Noted    Left non proliferative retinopathy due to secondary diabetes ([facility]) 01/03/2024    S/P total left hip arthroplasty 11/01/2023    Calculus of gallbladder without cholecystitis without obstruction 10/20/2023    Type 2 diabetes mellitus with diabetic polyneuropathy, with long-term current use of insulin ([facility]) 04/04/2023    Long-term insulin use ([facility]) 04/04/2023    Chest pain 04/04/2023    Other headache syndrome 04/04/2023    Postmenopause 11/20/2020    History of ischemic stroke 09/15/2020    Low vitamin D level 09/15/2020    Oliguria 08/04/2020    Onychomycosis 08/04/2020    Intertrigo 08/04/2020    Other hemorrhoids 08/04/2020    Postural dizziness with presyncope 08/04/2020    Fall from ground level 08/04/2020    Other fatigue 08/04/2020    Snoring 08/04/2020    Hair loss 08/04/2020    Chronic constipation 08/04/2020    Chronic left hip pain 10/19/2015    Fibroid, uterine 06/12/2015    Hyperlipidemia with target LDL less than 70 06/12/2015    GERD (gastroesophageal reflux disease) 04/16/2015    Varicose veins 04/13/2015       Current Outpatient Medications   Medication Sig Dispense Refill    insulin glargine 100 UNIT/ML SC SOPN injection Inject 25 Units under the skin every evening. 27 mL 1    metFORMIN (GLUCOPHAGE) 500 MG Tab Take 1 Tablet by mouth 2 times a day with meals. 180 Tablet 3    SITagliptin (JANUVIA) 50 MG Tab Take 1 Tablet by mouth every day. 90 Tablet 3    insulin glargine 100 UNIT/ML SC SOPN injection Inject 30 Units under the skin every evening. 27 mL 1    Empagliflozin (JARDIANCE) 25 MG  "Tab Take 1 Tablet by mouth every day. 90 Tablet 1    SITagliptin (JANUVIA) 50 MG Tab Take 1 Tablet by mouth every day. 90 Tablet 1    fluticasone (FLONASE) 50 MCG/ACT nasal spray ADMINISTER 2 SPRAYS INTO AFFECTED NOSTRIL(S) EVERY EVENING. 48 mL 1    omeprazole (PRILOSEC) 20 MG delayed-release capsule Take 1 Capsule by mouth every day. 90 Capsule 3    meclizine (ANTIVERT) 25 MG Tab Take 1 Tablet by mouth 3 times a day as needed for Dizziness. 30 Tablet 0    rosuvastatin (CRESTOR) 5 MG Tab Take 1 Tablet by mouth every evening. 90 Tablet 3    aspirin (ASPIRIN 81) 81 MG Chew Tab chewable tablet Chew 1 Tablet 2 times a day with meals. Take for 6 weeks for dvt prevention. 84 Tablet 0    ondansetron (ZOFRAN ODT) 4 MG TABLET DISPERSIBLE Take 1 Tablet by mouth every 8 hours as needed for Nausea/Vomiting. 20 Tablet 0    Naproxen Sodium (FLANAX PAIN RELIEF PO) Take  by mouth as needed.      Insulin Pen Needle 32 G x 4 mm (BD PEN NEEDLE ROB 2ND GEN) USE 1 PEN NEEDLE EVERY  Each 3    ACCU-CHEK GUIDE strip USE TWICE DAILY AND AS NEEDED FOR HIGH OR LOW BLOOD SUGAR 100 Strip 3    Lancets Lancets order: Lancets for Accucheck Guide meter. Sig: use once daily and prn ssx high or low sugar. #100 RF x 3 100 Each 3    ergocalciferol (DRISDOL) 04139 UNIT capsule Take 1 Capsule by mouth every 7 days. 12 Capsule 3    Blood Glucose Test Strips Test strips order: Test strips for Accucheck guide meter. Sig: use twice daily and prn ssx high or low sugar #100 RF x 3 (may exchange from brand strip compatible with her machine) 100 Each 3     No current facility-administered medications for this visit.         /78 (BP Location: Left arm, Patient Position: Sitting, BP Cuff Size: Adult)   Pulse 98   Temp 36.9 °C (98.4 °F) (Temporal)   Ht 1.626 m (5' 4\")   Wt 81.4 kg (179 lb 8 oz)   SpO2 94%   BMI 30.81 kg/m²     Physical Exam  General: Alert and oriented, No apparent distress.    Assessment and Plan    1. Type 2 diabetes mellitus " with diabetic polyneuropathy, with long-term current use of insulin ([facility])  - POCT  A1C 9.4  She has switched to Medicare in Feb this year and currently not having medication coverage.  She is reducing her current insulin dose to 20 units daily to make it last.  According to medication dispense history, she has not been dispensing Januvia or Jardiance for several months which explained the rise in A1C.    [facility]. Paper prescription given in addition to electronic prescription sent to pharmacy for immediate refill.  Sent Jardiance samples to [facility] pharmacy which could be adequate for at least 3 months. Electronic prescription sent for Januvia and a paper prescription given to shop around with cash price.    Discussed that her long-acting insulin should be increased slowly to 30 units daily if her FBS >150. Currently, it's been in 230s. Hold insulin increase if she is able to reintroduce her oral medications.    - insulin glargine 100 UNIT/ML SC SOPN injection; Inject 30 Units under the skin every evening.  Dispense: 27 mL; Refill: 1  - metFORMIN (GLUCOPHAGE) 500 MG Tab; Take 1 Tablet by mouth 2 times a day with meals.  Dispense: 180 Tablet; Refill: 3  - SITagliptin (JANUVIA) 50 MG Tab; Take 1 Tablet by mouth every day.  Dispense: 90 Tablet; Refill: 3  - insulin glargine 100 UNIT/ML SC SOPN injection; Inject 30 Units under the skin every evening.  Dispense: 27 mL; Refill: 1  - Empagliflozin (JARDIANCE) 25 MG Tab; Take 1 Tablet by mouth every day.  Dispense: 90 Tablet; Refill: 1  - SITagliptin (JANUVIA) 50 MG Tab; Take 1 Tablet by mouth every day.  Dispense: 90 Tablet; Refill: 1    2. Need for vaccination  - Influenza Vaccine, High Dose (65+ Only)        Return in about 2 months (around 11/27/2024).  With new PCP        Signed by: Mena Christensen M.D.

## (undated) DEVICE — DRAPE SURGICAL U 77X120 - (10/CA)

## (undated) DEVICE — SUTURE 0 SILK TIES (36PK/BX)

## (undated) DEVICE — GLOVE BIOGEL INDICATOR SZ 7SURGICAL PF LTX - (50/BX 4BX/CA)

## (undated) DEVICE — SENSOR OXIMETER ADULT SPO2 RD SET (20EA/BX)

## (undated) DEVICE — IMPLANT FLEXIBLE DRILL 35MM (1EA)

## (undated) DEVICE — SODIUM CHL IRRIGATION 0.9% 1000ML (12EA/CA)

## (undated) DEVICE — DRAPE C-ARM LARGE 41IN X 74 IN - (10/BX 2BX/CA)

## (undated) DEVICE — GLOVE BIOGEL SZ 8 SURGICAL PF LTX - (50PR/BX 4BX/CA)

## (undated) DEVICE — SUTURE GENERAL

## (undated) DEVICE — SUTURE 2-0 MONOCRYL PLUS UNDYED CT-1 1 X 36 (36EA/BX)"

## (undated) DEVICE — SODIUM CHL. IRRIGATION 0.9% 3000ML (4EA/CA 65CA/PF)

## (undated) DEVICE — GLOVE BIOGEL PI INDICATOR SZ 7.0 SURGICAL PF LF - (50/BX 4BX/CA)

## (undated) DEVICE — ELECTRODE DUAL RETURN W/ CORD - (50/PK)

## (undated) DEVICE — GOWN WARMING STANDARD FLEX - (30/CA)

## (undated) DEVICE — HANDPIECE 10FT INTPLS SCT PLS IRRIGATION HAND CONTROL SET (6/PK)

## (undated) DEVICE — GLOVE BIOGEL INDICATOR SZ 8 SURGICAL PF LTX - (50/BX 4BX/CA)

## (undated) DEVICE — GLOVE SZ 7 BIOGEL PI MICRO - PF LF (50PR/BX 4BX/CA)

## (undated) DEVICE — CANISTER SUCTION RIGID RED 1500CC (40EA/CA)

## (undated) DEVICE — BLADE SAGITTAL SYSTEM 18MM

## (undated) DEVICE — PACK TOTAL HIP - (1/CA)

## (undated) DEVICE — TOWELS CLOTH SURGICAL - (4/PK 20PK/CA)

## (undated) DEVICE — HUMID-VENT HEAT AND MOISTURE EXCHANGE- (50/BX)

## (undated) DEVICE — DRAPE SRG LG BCK TBL DISP - 10/CA

## (undated) DEVICE — DRESSING TRANSPARENT FILM TEGADERM 4 X 4.75" (50EA/BX)"

## (undated) DEVICE — DRESSING STERILE BURN ACTICOAT FLEX 7 (50EA/CA)

## (undated) DEVICE — TOWEL STOP TIMEOUT SAFETY FLAG (40EA/CA)

## (undated) DEVICE — PEN SKIN MARKER W/RULER - (50EA/BX)

## (undated) DEVICE — WATER IRRIGATION STERILE 1000ML (12EA/CA)

## (undated) DEVICE — GLOVE BIOGEL SZ 7 SURGICAL PF LTX - (50PR/BX 4BX/CA)

## (undated) DEVICE — SUTURE 1 VICRYL PLUS CTX - 8 X 18 INCH (12/BX)

## (undated) DEVICE — BAG SPONGE COUNT 10.25 X 32 - BLUE (250/CA)

## (undated) DEVICE — DERMABOND ADVANCED - (12EA/BX)

## (undated) DEVICE — LACTATED RINGERS INJ 1000 ML - (14EA/CA 60CA/PF)

## (undated) DEVICE — GLOVE SURGICAL PROTEXIS PI 8.0 LF - (50PR/BX)

## (undated) DEVICE — TUBE CONNECTING SUCTION - CLEAR PLASTIC STERILE 72 IN (50EA/CA)

## (undated) DEVICE — LENS/HOOD FOR SPACESUIT - (32/PK) PEEL AWAY FACE

## (undated) DEVICE — DEVICE SKIN CLOSURE SURGICAL ZIP 16 (10EA/PK)

## (undated) DEVICE — GLOVE BIOGEL SZ 6.5 SURGICAL PF LTX (50PR/BX 4BX/CA)

## (undated) DEVICE — SET LEADWIRE 5 LEAD BEDSIDE DISPOSABLE ECG (1SET OF 5/EA)

## (undated) DEVICE — GLOVE BIOGEL PI INDICATOR SZ 8.0 SURGICAL PF LF -(50/BX 4BX/CA)

## (undated) DEVICE — TIP INTPLS HFLO ML ORFC BTRY - (12/CS)  FOR SURGILAV

## (undated) DEVICE — SPONGE GAUZESTER 4 X 4 4PLY - (128PK/CA)

## (undated) DEVICE — SUCTION INSTRUMENT YANKAUER BULBOUS TIP W/O VENT (50EA/CA)

## (undated) DEVICE — DRAPE LARGE 3 QUARTER - (20/CA)